# Patient Record
Sex: FEMALE | Race: WHITE | NOT HISPANIC OR LATINO | Employment: UNEMPLOYED | URBAN - METROPOLITAN AREA
[De-identification: names, ages, dates, MRNs, and addresses within clinical notes are randomized per-mention and may not be internally consistent; named-entity substitution may affect disease eponyms.]

---
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
## 2018-01-05 ENCOUNTER — OFFICE VISIT (OUTPATIENT)
Dept: FAMILY MEDICINE CLINIC | Facility: CLINIC | Age: 18
End: 2018-01-05

## 2018-01-05 VITALS
WEIGHT: 111 LBS | RESPIRATION RATE: 16 BRPM | HEIGHT: 62 IN | BODY MASS INDEX: 20.43 KG/M2 | HEART RATE: 102 BPM | OXYGEN SATURATION: 99 % | SYSTOLIC BLOOD PRESSURE: 112 MMHG | DIASTOLIC BLOOD PRESSURE: 68 MMHG | TEMPERATURE: 98.4 F

## 2018-01-05 DIAGNOSIS — F30.9 BIPOLAR I DISORDER, SINGLE MANIC EPISODE (HCC): Primary | ICD-10-CM

## 2018-01-05 DIAGNOSIS — Z79.899 HIGH RISK MEDICATION USE: ICD-10-CM

## 2018-01-05 DIAGNOSIS — F41.9 ANXIETY: ICD-10-CM

## 2018-01-05 LAB
BASOPHILS # BLD AUTO: 0.04 10*3/MM3 (ref 0–0.2)
BASOPHILS NFR BLD AUTO: 0.9 % (ref 0–2)
BUN SERPL-MCNC: 10 MG/DL (ref 6–20)
BUN/CREAT SERPL: 15.2 (ref 7–25)
CALCIUM SERPL-MCNC: 9.4 MG/DL (ref 8.6–10.5)
CHLORIDE SERPL-SCNC: 105 MMOL/L (ref 98–107)
CO2 SERPL-SCNC: 28.8 MMOL/L (ref 22–29)
CREAT SERPL-MCNC: 0.66 MG/DL (ref 0.57–1)
EOSINOPHIL # BLD AUTO: 0.06 10*3/MM3 (ref 0.1–0.3)
EOSINOPHIL NFR BLD AUTO: 1.4 % (ref 0–4)
ERYTHROCYTE [DISTWIDTH] IN BLOOD BY AUTOMATED COUNT: 12.7 % (ref 11.5–14.5)
GLUCOSE SERPL-MCNC: 82 MG/DL (ref 65–99)
HCT VFR BLD AUTO: 44 % (ref 37–47)
HGB BLD-MCNC: 14.6 G/DL (ref 12–16)
IMM GRANULOCYTES # BLD: 0.01 10*3/MM3 (ref 0–0.03)
IMM GRANULOCYTES NFR BLD: 0.2 % (ref 0–0.5)
LYMPHOCYTES # BLD AUTO: 1.84 10*3/MM3 (ref 0.6–4.8)
LYMPHOCYTES NFR BLD AUTO: 43 % (ref 20–45)
MCH RBC QN AUTO: 30 PG (ref 27–31)
MCHC RBC AUTO-ENTMCNC: 33.2 G/DL (ref 31–37)
MCV RBC AUTO: 90.3 FL (ref 81–99)
MONOCYTES # BLD AUTO: 0.43 10*3/MM3 (ref 0–1)
MONOCYTES NFR BLD AUTO: 10 % (ref 3–8)
NEUTROPHILS # BLD AUTO: 1.9 10*3/MM3 (ref 1.5–8.3)
NEUTROPHILS NFR BLD AUTO: 44.5 % (ref 45–70)
NRBC BLD AUTO-RTO: 0 /100 WBC (ref 0–0)
PLATELET # BLD AUTO: 216 10*3/MM3 (ref 140–500)
POTASSIUM SERPL-SCNC: 4.7 MMOL/L (ref 3.5–5.2)
RBC # BLD AUTO: 4.87 10*6/MM3 (ref 4.2–5.4)
SODIUM SERPL-SCNC: 143 MMOL/L (ref 136–145)
TSH SERPL DL<=0.005 MIU/L-ACNC: 1.11 MIU/ML (ref 0.27–4.2)
WBC # BLD AUTO: 4.28 10*3/MM3 (ref 4.8–10.8)

## 2018-01-05 PROCEDURE — 99213 OFFICE O/P EST LOW 20 MIN: CPT | Performed by: PHYSICIAN ASSISTANT

## 2018-01-05 RX ORDER — LAMOTRIGINE 100 MG/1
100 TABLET ORAL DAILY
Qty: 30 TABLET | Refills: 2 | Status: SHIPPED | OUTPATIENT
Start: 2018-01-05 | End: 2018-03-01 | Stop reason: SDUPTHER

## 2018-01-05 NOTE — PROGRESS NOTES
"Subjective   Ana Enriquez is a 17 y.o. female.   Chief Complaint   Patient presents with   • Anxiety     management       History of Present Illness     Ana is a 17 year old female Is for anxiety management.  She has gained 14 pounds since July 13, 2017. Ana has had some increased mood swings and short temper.  She has had some depressed moods. Denied any suicidal or homicidal thoughts. Appetite and sleep has been normal.  Last LMP January 4,2018.  She is on the depo shot for birth control.  She has not been seeing her therapist.    The following portions of the patient's history were reviewed and updated as appropriate: allergies, current medications, past family history, past medical history, past social history and past surgical history.    Review of Systems   Constitutional: Negative.    HENT: Negative.    Eyes: Negative.    Respiratory: Negative.    Cardiovascular: Negative.    Gastrointestinal: Negative.    Endocrine: Negative.    Genitourinary: Negative.    Musculoskeletal: Negative.    Skin: Negative.    Allergic/Immunologic: Negative.    Neurological: Negative.    Hematological: Negative.    Psychiatric/Behavioral: Positive for agitation. Negative for self-injury, sleep disturbance and suicidal ideas. The patient is nervous/anxious.    All other systems reviewed and are negative.    Vitals:    01/05/18 0750   BP: 112/68   BP Location: Left arm   Patient Position: Sitting   Cuff Size: Adult   Pulse: 102   Resp: 16   Temp: 98.4 °F (36.9 °C)   TempSrc: Oral   SpO2: 99%   Weight: 50.3 kg (111 lb)   Height: 158.1 cm (62.25\")   Body mass index is 20.14 kg/(m^2).  No Known Allergies      Wt Readings from Last 3 Encounters:   01/05/18 50.3 kg (111 lb) (19 %, Z= -0.89)*   07/13/17 44 kg (97 lb) (2 %, Z= -2.00)*   04/28/17 46 kg (101 lb 8 oz) (6 %, Z= -1.55)*     * Growth percentiles are based on CDC 2-20 Years data.       BP Readings from Last 3 Encounters:   01/05/18 112/68   07/13/17 98/64   05/19/17 " 111/68     Objective   Physical Exam   Constitutional: She is oriented to person, place, and time. Vital signs are normal. She appears well-developed and well-nourished.   Neck: Trachea normal and phonation normal. Neck supple. No edema present. No thyroid mass and no thyromegaly present.   Cardiovascular: Normal rate, regular rhythm, S1 normal, S2 normal, normal heart sounds and normal pulses.    Pulmonary/Chest: Effort normal and breath sounds normal.   Abdominal: Soft. Normal appearance and bowel sounds are normal. There is no hepatomegaly. There is no tenderness.   Neurological: She is alert and oriented to person, place, and time.   Skin: Skin is warm, dry and intact.   Psychiatric: She has a normal mood and affect. Her speech is normal and behavior is normal. Judgment and thought content normal. Cognition and memory are normal.       Assessment/Plan   Ana was seen today for anxiety.    Diagnoses and all orders for this visit:    Bipolar I disorder, single manic episode  -     lamoTRIgine (LAMICTAL) 100 MG tablet; Take 1 tablet by mouth Daily.  -     Basic Metabolic Panel  -     TSH  -     CBC & Differential    Anxiety  -     lamoTRIgine (LAMICTAL) 100 MG tablet; Take 1 tablet by mouth Daily.  -     Basic Metabolic Panel  -     TSH  -     CBC & Differential    High risk medication use  -     lamoTRIgine (LAMICTAL) 100 MG tablet; Take 1 tablet by mouth Daily.  -     Basic Metabolic Panel  -     TSH  -     CBC & Differential      Ms. Ana Enriquez was seen in office today for follow-up on bipolar and anxiety disorder.  She is having increased mood swings.  I will increase her Lamictal to 100 mg daily.  She is on a high risk medication and will a CBC, TSH and a BMP collected at today's office visit.  She will be notified of test results when completed.  Ana will continue her Paxil 30 mg prescription at home.  I've asked her to return to office in one month for reevaluation.  She voiced understanding.         Dragon transcription disclaimer    Much of this encounter note is an electronic transcription/translation of spoken language to printed text.  The electronic translation of spoken language may permit erroneous, or at times, nonsensical words or phrases to be inadvertently transcribed.  Although I have reviewed the note for such errors, some may still exist.    Nimco Tate PA-C  Family Practice

## 2018-01-08 ENCOUNTER — TELEPHONE (OUTPATIENT)
Dept: FAMILY MEDICINE CLINIC | Facility: CLINIC | Age: 18
End: 2018-01-08

## 2018-01-08 NOTE — TELEPHONE ENCOUNTER
----- Message from Nimco Tate PA-C sent at 1/8/2018  6:44 AM EST -----  1.  Notify patient that her BMP was normal.  2.  TSH was normal at 1.1.  3.  White count was slightly decreased at 4.28.  This is typical for her.  Plan to repeat in 3 months.

## 2018-02-13 ENCOUNTER — OFFICE VISIT (OUTPATIENT)
Dept: FAMILY MEDICINE CLINIC | Facility: CLINIC | Age: 18
End: 2018-02-13

## 2018-02-13 VITALS
HEART RATE: 100 BPM | TEMPERATURE: 98.9 F | HEIGHT: 62 IN | DIASTOLIC BLOOD PRESSURE: 68 MMHG | WEIGHT: 108 LBS | SYSTOLIC BLOOD PRESSURE: 98 MMHG | OXYGEN SATURATION: 99 % | BODY MASS INDEX: 19.88 KG/M2

## 2018-02-13 DIAGNOSIS — A08.4 VIRAL GASTROENTERITIS: ICD-10-CM

## 2018-02-13 DIAGNOSIS — R05.9 COUGH: Primary | ICD-10-CM

## 2018-02-13 LAB
EXPIRATION DATE: NORMAL
FLUAV AG NPH QL: NEGATIVE
FLUBV AG NPH QL: NEGATIVE
INTERNAL CONTROL: NORMAL
Lab: NORMAL

## 2018-02-13 PROCEDURE — 99213 OFFICE O/P EST LOW 20 MIN: CPT | Performed by: NURSE PRACTITIONER

## 2018-02-13 PROCEDURE — 87804 INFLUENZA ASSAY W/OPTIC: CPT | Performed by: NURSE PRACTITIONER

## 2018-02-13 RX ORDER — ONDANSETRON 4 MG/1
4 TABLET, ORALLY DISINTEGRATING ORAL EVERY 8 HOURS PRN
COMMUNITY
End: 2018-04-23

## 2018-02-13 NOTE — PROGRESS NOTES
Subjective   Ana Enriquez is a 17 y.o. female.     Chief Complaint   Patient presents with   • Vomiting      went to urgent care sunday night    • Diarrhea   • Nasal Congestion     Social History   Substance Use Topics   • Smoking status: Current Every Day Smoker     Years: 5.00     Types: Electronic Cigarette   • Smokeless tobacco: Never Used      Comment: DENIED   • Alcohol use No      Comment: DENIED       Vomiting    This is a new problem. The current episode started in the past 7 days (3 days). The problem has been gradually improving. The emesis has an appearance of stomach contents and bile. There has been no fever. Associated symptoms include abdominal pain (lower abdomen), chills, diarrhea, dizziness, myalgias (soreness) and sweats. Pertinent negatives include no coughing or fever. Associated symptoms comments: Nauseated  Chest congestion  Body aches      . Risk factors include ill contacts. Treatments tried: zofran. The treatment provided mild relief.     Review of Systems   Constitutional: Positive for chills and fatigue. Negative for fever.   HENT: Negative.    Respiratory: Positive for chest tightness (congestion at night). Negative for cough.    Cardiovascular: Negative.    Gastrointestinal: Positive for abdominal pain (lower abdomen), diarrhea and vomiting.   Musculoskeletal: Positive for myalgias (soreness).   Neurological: Positive for dizziness.   Psychiatric/Behavioral: Negative.    All other systems reviewed and are negative.    Physical Exam   Gen: mildly ill appearing, alert  Ears: Tm's bulging without redness  Nose:  Congestion  Throat:  Red without exudate, some drainage, tonsils okay  Neck: no LAD  Lung: good air movement, regular RR  Abd: increased bowel sounds throughout, tenderness in LUQ and LLQ.   Heart: RR without murmur  Skin: no rash.    The following portions of the patient's history were reviewed and updated as appropriate: allergies, current medications,past medical hx, family  "hx, past social history an...    Chief Complaint   Patient presents with   • Vomiting      went to urgent care sunday night    • Diarrhea   • Nasal Congestion       Vitals:    02/13/18 1010   BP: 98/68   Pulse: 100   Temp: 98.9 °F (37.2 °C)   TempSrc: Oral   SpO2: 99%   Weight: 49 kg (108 lb)   Height: 158.1 cm (62.25\")       Assessment/Plan   Problems Addressed this Visit     None      Visit Diagnoses     Cough    -  Primary    Relevant Orders    POCT Influenza A/B (Completed)    Viral gastroenteritis            Results for orders placed or performed in visit on 02/13/18   POCT Influenza A/B   Result Value Ref Range    Rapid Influenza A Ag negative     Rapid Influenza B Ag negative     Internal Control Passed Passed    Lot Number [id number]     Expiration Date 12/1/2019      Continue Zofran as needed. May also want to  probiotic and/or imodium. Encouraged fluid intake and to reintroduce foods via BRAT diet.   Work note given to return when able to eat 2 meals and improved or resolved diarrhea.        Tylenol or Advil as needed for pain, fever, muscle aches  Plenty of fluids  Hand washing discussed  Off work or school note given if needed.  Warm tea for throat.      Jo Ann PEREZ  Family Practice  [place], [place]  [facility]  "

## 2018-03-01 DIAGNOSIS — F41.9 ANXIETY: ICD-10-CM

## 2018-03-01 DIAGNOSIS — F30.9 BIPOLAR I DISORDER, SINGLE MANIC EPISODE (HCC): ICD-10-CM

## 2018-03-01 DIAGNOSIS — Z79.899 HIGH RISK MEDICATION USE: ICD-10-CM

## 2018-03-01 RX ORDER — PAROXETINE 30 MG/1
30 TABLET, FILM COATED ORAL EVERY MORNING
Qty: 30 TABLET | Refills: 2 | Status: SHIPPED | OUTPATIENT
Start: 2018-03-01 | End: 2018-07-18 | Stop reason: SDUPTHER

## 2018-03-01 RX ORDER — LAMOTRIGINE 100 MG/1
100 TABLET ORAL DAILY
Qty: 30 TABLET | Refills: 2 | Status: SHIPPED | OUTPATIENT
Start: 2018-03-01 | End: 2018-07-13 | Stop reason: SDUPTHER

## 2018-04-18 ENCOUNTER — OFFICE VISIT (OUTPATIENT)
Dept: FAMILY MEDICINE CLINIC | Facility: CLINIC | Age: 18
End: 2018-04-18

## 2018-04-18 VITALS
HEIGHT: 62 IN | OXYGEN SATURATION: 98 % | DIASTOLIC BLOOD PRESSURE: 58 MMHG | HEART RATE: 88 BPM | WEIGHT: 115 LBS | RESPIRATION RATE: 16 BRPM | SYSTOLIC BLOOD PRESSURE: 98 MMHG | TEMPERATURE: 98.7 F | BODY MASS INDEX: 21.16 KG/M2

## 2018-04-18 DIAGNOSIS — R63.2 POLYPHAGIA: ICD-10-CM

## 2018-04-18 DIAGNOSIS — R07.89 CHEST PAIN, MID STERNAL: Primary | ICD-10-CM

## 2018-04-18 DIAGNOSIS — M54.50 ACUTE MIDLINE LOW BACK PAIN WITHOUT SCIATICA: ICD-10-CM

## 2018-04-18 DIAGNOSIS — N39.0 URINARY TRACT INFECTION WITHOUT HEMATURIA, SITE UNSPECIFIED: ICD-10-CM

## 2018-04-18 DIAGNOSIS — R35.0 URINE FREQUENCY: ICD-10-CM

## 2018-04-18 LAB
B-HCG UR QL: NEGATIVE
BILIRUB BLD-MCNC: NEGATIVE MG/DL
CLARITY, POC: CLEAR
COLOR UR: YELLOW
GLUCOSE UR STRIP-MCNC: NEGATIVE MG/DL
INTERNAL NEGATIVE CONTROL: NEGATIVE
INTERNAL POSITIVE CONTROL: POSITIVE
KETONES UR QL: NEGATIVE
LEUKOCYTE EST, POC: ABNORMAL
Lab: NORMAL
NITRITE UR-MCNC: NEGATIVE MG/ML
PH UR: 8.5 [PH] (ref 5–8)
PROT UR STRIP-MCNC: ABNORMAL MG/DL
RBC # UR STRIP: NEGATIVE /UL
SP GR UR: 1.01 (ref 1–1.03)
UROBILINOGEN UR QL: NORMAL

## 2018-04-18 PROCEDURE — 81025 URINE PREGNANCY TEST: CPT | Performed by: PHYSICIAN ASSISTANT

## 2018-04-18 PROCEDURE — 99214 OFFICE O/P EST MOD 30 MIN: CPT | Performed by: PHYSICIAN ASSISTANT

## 2018-04-18 PROCEDURE — 93000 ELECTROCARDIOGRAM COMPLETE: CPT | Performed by: PHYSICIAN ASSISTANT

## 2018-04-18 PROCEDURE — 81002 URINALYSIS NONAUTO W/O SCOPE: CPT | Performed by: PHYSICIAN ASSISTANT

## 2018-04-18 RX ORDER — NITROFURANTOIN 25; 75 MG/1; MG/1
100 CAPSULE ORAL 2 TIMES DAILY
Qty: 14 CAPSULE | Refills: 0 | Status: SHIPPED | OUTPATIENT
Start: 2018-04-18 | End: 2018-04-26

## 2018-04-18 NOTE — PROGRESS NOTES
Subjective   Ana Enriquez is a 17 y.o. female.   Chief Complaint   Patient presents with   • Excessive Sweating   • Back Pain   • Headache       History of Present Illness     Miss Ana Enriquez is a 17-year-old female who presents with headaches,excessive sweating,urine frequency,sternum chest pain,shortness of air with exertion and sitting,nausea,diarrhea,fevers,chills,blurred vision,polydipsia,polyuria,polyphagia,urine hesitancy,sight cough,wheezing, and low back pain for the past  3 weeks or so.  Diet has been normal but not healthy.  Sleep has normal.  She takes depo shot for birth control.  Last depo shot was February 2018.  Denied ear pressure ,sinus pressure or sore throat.  Last eye exam was February 2018.   Her paternal grandparent and maternal grandmother has diabetes. Ana has gained 7 pounds since her last office visit in February 2018.    The following portions of the patient's history were reviewed and updated as appropriate: allergies, current medications, past family history, past medical history, past social history and past surgical history.    Review of Systems   Constitutional: Negative.    HENT: Negative.    Eyes: Negative.    Respiratory: Positive for cough and wheezing. Negative for choking and shortness of breath.    Cardiovascular: Negative.    Gastrointestinal: Negative.    Endocrine: Positive for heat intolerance, polydipsia, polyphagia and polyuria.   Genitourinary: Negative.    Musculoskeletal: Positive for back pain.   Skin: Negative.    Allergic/Immunologic: Negative.    Neurological: Negative.    Hematological: Negative.    Psychiatric/Behavioral: Negative.  Negative for sleep disturbance and suicidal ideas.   All other systems reviewed and are negative.      Social History     Social History   • Marital status: Single     Spouse name: N/A   • Number of children: N/A   • Years of education: N/A     Occupational History   • Not on file.     Social History Main Topics   • Smoking  "status: Current Every Day Smoker     Years: 5.00     Types: Electronic Cigarette   • Smokeless tobacco: Never Used      Comment: DENIED   • Alcohol use No      Comment: DENIED   • Drug use: Unknown   • Sexual activity: Defer     Other Topics Concern   • Not on file     Social History Narrative   • No narrative on file     Vitals:    04/18/18 0733   BP: 98/58   BP Location: Right arm   Patient Position: Sitting   Cuff Size: Adult   Pulse: 88   Resp: 16   Temp: 98.7 °F (37.1 °C)   TempSrc: Oral   SpO2: 98%   Weight: 52.2 kg (115 lb)   Height: 158.1 cm (62.25\")       Wt Readings from Last 3 Encounters:   04/18/18 52.2 kg (115 lb) (25 %, Z= -0.66)*   02/13/18 49 kg (108 lb) (13 %, Z= -1.12)*   01/05/18 50.3 kg (111 lb) (19 %, Z= -0.89)*     * Growth percentiles are based on CDC 2-20 Years data.       BP Readings from Last 3 Encounters:   04/18/18 98/58   02/13/18 98/68   01/05/18 112/68     Body mass index is 20.87 kg/m².  No Known Allergies    Objective   Physical Exam   Constitutional: Vital signs are normal. She appears well-developed and well-nourished.   HENT:   Head: Normocephalic and atraumatic.   Right Ear: Hearing, tympanic membrane, external ear and ear canal normal.   Left Ear: Hearing, tympanic membrane, external ear and ear canal normal.   Nose: Nose normal. Right sinus exhibits no maxillary sinus tenderness and no frontal sinus tenderness. Left sinus exhibits no maxillary sinus tenderness and no frontal sinus tenderness.   Mouth/Throat: Uvula is midline, oropharynx is clear and moist and mucous membranes are normal.   Eyes: Conjunctivae, EOM and lids are normal.   Neck: Trachea normal and phonation normal. Neck supple. Carotid bruit is not present. No edema present. No thyromegaly present.   Cardiovascular: Normal rate, regular rhythm, S1 normal, S2 normal, normal heart sounds and normal pulses.        Pulmonary/Chest: Effort normal and breath sounds normal.   Abdominal: Soft. Normal appearance, normal " aorta and bowel sounds are normal. There is no hepatomegaly. There is no tenderness. There is no CVA tenderness.   Musculoskeletal:        Lumbar back: Normal. She exhibits normal range of motion, no tenderness, no bony tenderness, no pain, no spasm and normal pulse.   Low back:  Non tender to palpation, FROM,5/5 B/L muscle strength,negative SLR,2+ DTR's,2+ distal pulses,and good heel-toe walk.   Lymphadenopathy:     She has no cervical adenopathy.   Neurological: She is alert. She has normal strength. No sensory deficit.   Reflex Scores:       Patellar reflexes are 2+ on the right side and 2+ on the left side.  Skin: Skin is warm, dry and intact. Capillary refill takes less than 2 seconds.   Psychiatric: She has a normal mood and affect. Her speech is normal and behavior is normal. Judgment and thought content normal. Cognition and memory are normal.           ECG 12 Lead  Date/Time: 4/18/2018 7:51 AM  Performed by: SARAH BETH SINGLETARY  Authorized by: SARAH BETH SINGLETARY   Comparison: not compared with previous ECG   Rhythm: sinus rhythm  Rate: normal  BPM: 72  Conduction: conduction normal  ST Segments: ST segments normal  T Waves: T waves normal  QRS axis: normal  Clinical impression: normal ECG  Comments: Indication: Sternal chest pain  P/IL:  90/140 ms  QRS:  84 ms  QT/QTc:  384/420 ms                Results for orders placed or performed in visit on 04/18/18   POC Pregnancy, Urine   Result Value Ref Range    HCG, Urine, QL Negative Negative    Lot Number [id number]     Internal Positive Control Positive     Internal Negative Control Negative    POC Urinalysis Dipstick   Result Value Ref Range    Color Yellow Yellow, Straw, Dark Yellow, Neha    Clarity, UA Clear Clear    Glucose, UA Negative Negative, 1000 mg/dL (3+) mg/dL    Bilirubin Negative Negative    Ketones, UA Negative Negative    Specific Gravity  1.010 1.005 - 1.030    Blood, UA Negative Negative    pH, Urine 8.5 (A) 5.0 - 8.0    Protein, POC Trace (A)  Negative mg/dL    Urobilinogen, UA Normal Normal    Leukocytes Trace (A) Negative    Nitrite, UA Negative Negative     Assessment/Plan   Ana was seen today for excessive sweating, back pain and headache.    Diagnoses and all orders for this visit:    Chest pain, mid sternal  -     CBC & Differential  -     Comprehensive Metabolic Panel  -     Thyroid Panel With TSH    Urine frequency  -     POC Pregnancy, Urine  -     POC Urinalysis Dipstick  -     Urine Culture - Urine, Urine, Clean Catch  -     nitrofurantoin, macrocrystal-monohydrate, (MACROBID) 100 MG capsule; Take 1 capsule by mouth 2 (Two) Times a Day.    Polyphagia  -     CBC & Differential  -     Comprehensive Metabolic Panel  -     Thyroid Panel With TSH    Acute midline low back pain without sciatica    Urinary tract infection without hematuria, site unspecified  -     Urine Culture - Urine, Urine, Clean Catch  -     nitrofurantoin, macrocrystal-monohydrate, (MACROBID) 100 MG capsule; Take 1 capsule by mouth 2 (Two) Times a Day.    Other orders  -     ECG 12 Lead    1.  New Mid-Sternal chest pain: In office EKG was normal.  She will have a CBC, CMP and a thyroid profile with TSH collected at today's office visit.  I'll consider imaging study in future if warranted.  Ana will be notified of test results when completed.  2.  New low back pain with urine frequency: In office urinalysis showed trace protein and leukocytes.  In office urine pregnancy test was negative.  A culture and sensitivity will be seen today laboratory for further evaluation.  I have prescribed Macrobid antibiotic to pharmacy.  Eda he will be notified of test results when completed.  3. New polyphagia:  Ana will have a CBC, CMP and a thyroid profile with TSH collected at today's office visit for further evaluation of her symptoms.  She'll be notified of test results when completed.

## 2018-04-20 LAB
ALBUMIN SERPL-MCNC: 4.7 G/DL (ref 3.5–5.2)
ALBUMIN/GLOB SERPL: 2.1 G/DL
ALP SERPL-CCNC: 69 U/L (ref 40–129)
ALT SERPL-CCNC: 8 U/L (ref 5–33)
AST SERPL-CCNC: 22 U/L (ref 5–32)
BACTERIA UR CULT: NORMAL
BACTERIA UR CULT: NORMAL
BASOPHILS # BLD AUTO: 0.04 10*3/MM3 (ref 0–0.2)
BASOPHILS NFR BLD AUTO: 0.8 % (ref 0–2)
BILIRUB SERPL-MCNC: 0.5 MG/DL (ref 0.2–1.2)
BUN SERPL-MCNC: 12 MG/DL (ref 6–20)
BUN/CREAT SERPL: 16.9 (ref 7–25)
CALCIUM SERPL-MCNC: 9.6 MG/DL (ref 8.6–10.5)
CHLORIDE SERPL-SCNC: 104 MMOL/L (ref 98–107)
CO2 SERPL-SCNC: 25.7 MMOL/L (ref 22–29)
CREAT SERPL-MCNC: 0.71 MG/DL (ref 0.57–1)
EOSINOPHIL # BLD AUTO: 0.1 10*3/MM3 (ref 0.1–0.3)
EOSINOPHIL NFR BLD AUTO: 2 % (ref 0–4)
ERYTHROCYTE [DISTWIDTH] IN BLOOD BY AUTOMATED COUNT: 12 % (ref 11.5–14.5)
FT4I SERPL CALC-MCNC: 1.6 (ref 1.2–4.9)
GFR SERPLBLD CREATININE-BSD FMLA CKD-EPI: 106 ML/MIN/1.73
GFR SERPLBLD CREATININE-BSD FMLA CKD-EPI: 129 ML/MIN/1.73
GLOBULIN SER CALC-MCNC: 2.2 GM/DL
GLUCOSE SERPL-MCNC: 91 MG/DL (ref 65–99)
HCT VFR BLD AUTO: 44.8 % (ref 37–47)
HGB BLD-MCNC: 15.1 G/DL (ref 12–16)
IMM GRANULOCYTES # BLD: 0.01 10*3/MM3 (ref 0–0.03)
IMM GRANULOCYTES NFR BLD: 0.2 % (ref 0–0.5)
LYMPHOCYTES # BLD AUTO: 1.86 10*3/MM3 (ref 0.6–4.8)
LYMPHOCYTES NFR BLD AUTO: 36.8 % (ref 20–45)
MCH RBC QN AUTO: 31.5 PG (ref 27–31)
MCHC RBC AUTO-ENTMCNC: 33.7 G/DL (ref 31–37)
MCV RBC AUTO: 93.3 FL (ref 81–99)
MONOCYTES # BLD AUTO: 0.38 10*3/MM3 (ref 0–1)
MONOCYTES NFR BLD AUTO: 7.5 % (ref 3–8)
NEUTROPHILS # BLD AUTO: 2.66 10*3/MM3 (ref 1.5–8.3)
NEUTROPHILS NFR BLD AUTO: 52.7 % (ref 45–70)
NRBC BLD AUTO-RTO: 0 /100 WBC (ref 0–0)
PLATELET # BLD AUTO: 201 10*3/MM3 (ref 140–500)
POTASSIUM SERPL-SCNC: 4.3 MMOL/L (ref 3.5–5.2)
PROT SERPL-MCNC: 6.9 G/DL (ref 6–8.5)
RBC # BLD AUTO: 4.8 10*6/MM3 (ref 4.2–5.4)
SODIUM SERPL-SCNC: 143 MMOL/L (ref 136–145)
T3RU NFR SERPL: 26 % (ref 24–39)
T4 SERPL-MCNC: 6 UG/DL (ref 4.5–12)
TSH SERPL DL<=0.005 MIU/L-ACNC: 1.06 UIU/ML (ref 0.45–4.5)
WBC # BLD AUTO: 5.05 10*3/MM3 (ref 4.8–10.8)

## 2018-04-23 ENCOUNTER — OFFICE VISIT (OUTPATIENT)
Dept: RETAIL CLINIC | Facility: CLINIC | Age: 18
End: 2018-04-23

## 2018-04-23 VITALS
TEMPERATURE: 98.5 F | HEART RATE: 98 BPM | DIASTOLIC BLOOD PRESSURE: 68 MMHG | OXYGEN SATURATION: 96 % | SYSTOLIC BLOOD PRESSURE: 110 MMHG | RESPIRATION RATE: 15 BRPM

## 2018-04-23 DIAGNOSIS — J06.9 VIRAL UPPER RESPIRATORY TRACT INFECTION: ICD-10-CM

## 2018-04-23 DIAGNOSIS — J40 BRONCHITIS: Primary | ICD-10-CM

## 2018-04-23 PROCEDURE — 99213 OFFICE O/P EST LOW 20 MIN: CPT | Performed by: NURSE PRACTITIONER

## 2018-04-23 RX ORDER — PREDNISONE 1 MG/1
TABLET ORAL
Qty: 21 TABLET | Refills: 0 | Status: SHIPPED | OUTPATIENT
Start: 2018-04-23 | End: 2018-04-26

## 2018-04-23 RX ORDER — ALBUTEROL SULFATE 90 UG/1
2 AEROSOL, METERED RESPIRATORY (INHALATION) EVERY 4 HOURS PRN
Qty: 1 INHALER | Refills: 0 | Status: SHIPPED | OUTPATIENT
Start: 2018-04-23 | End: 2019-07-22

## 2018-04-23 RX ORDER — CETIRIZINE HYDROCHLORIDE 10 MG/1
10 TABLET ORAL DAILY
Qty: 10 TABLET | Refills: 0 | Status: SHIPPED | OUTPATIENT
Start: 2018-04-23 | End: 2018-04-26

## 2018-04-23 RX ORDER — BENZONATATE 100 MG/1
100 CAPSULE ORAL 2 TIMES DAILY PRN
Qty: 14 CAPSULE | Refills: 0 | Status: SHIPPED | OUTPATIENT
Start: 2018-04-23 | End: 2018-07-18

## 2018-04-23 NOTE — PATIENT INSTRUCTIONS
"Upper Respiratory Infection, Adult  Most upper respiratory infections (URIs) are caused by a virus. A URI affects the nose, throat, and upper air passages. The most common type of URI is often called \"the common cold.\"  Follow these instructions at home:  · Take medicines only as told by your doctor.  · Gargle warm saltwater or take cough drops to comfort your throat as told by your doctor.  · Use a warm mist humidifier or inhale steam from a shower to increase air moisture. This may make it easier to breathe.  · Drink enough fluid to keep your pee (urine) clear or pale yellow.  · Eat soups and other clear broths.  · Have a healthy diet.  · Rest as needed.  · Go back to work when your fever is gone or your doctor says it is okay.  ¨ You may need to stay home longer to avoid giving your URI to others.  ¨ You can also wear a face mask and wash your hands often to prevent spread of the virus.  · Use your inhaler more if you have asthma.  · Do not use any tobacco products, including cigarettes, chewing tobacco, or electronic cigarettes. If you need help quitting, ask your doctor.  Contact a doctor if:  · You are getting worse, not better.  · Your symptoms are not helped by medicine.  · You have chills.  · You are getting more short of breath.  · You have brown or red mucus.  · You have yellow or brown discharge from your nose.  · You have pain in your face, especially when you bend forward.  · You have a fever.  · You have puffy (swollen) neck glands.  · You have pain while swallowing.  · You have white areas in the back of your throat.  Get help right away if:  · You have very bad or constant:  ¨ Headache.  ¨ Ear pain.  ¨ Pain in your forehead, behind your eyes, and over your cheekbones (sinus pain).  ¨ Chest pain.  · You have long-lasting (chronic) lung disease and any of the following:  ¨ Wheezing.  ¨ Long-lasting cough.  ¨ Coughing up blood.  ¨ A change in your usual mucus.  · You have a stiff neck.  · You have " changes in your:  ¨ Vision.  ¨ Hearing.  ¨ Thinking.  ¨ Mood.  This information is not intended to replace advice given to you by your health care provider. Make sure you discuss any questions you have with your health care provider.  Document Released: 06/05/2009 Document Revised: 08/20/2017 Document Reviewed: 03/25/2015  "[employer] Interactive Patient Education © 2017 "[employer] Inc.    Acute Bronchitis, Adult  Acute bronchitis is when air tubes (bronchi) in the lungs suddenly get swollen. The condition can make it hard to breathe. It can also cause these symptoms:  · A cough.  · Coughing up clear, yellow, or green mucus.  · Wheezing.  · Chest congestion.  · Shortness of breath.  · A fever.  · Body aches.  · Chills.  · A sore throat.  Follow these instructions at home:  Medicines   · Take over-the-counter and prescription medicines only as told by your doctor.  · If you were prescribed an antibiotic medicine, take it as told by your doctor. Do not stop taking the antibiotic even if you start to feel better.  General instructions   · Rest.  · Drink enough fluids to keep your pee (urine) clear or pale yellow.  · Avoid smoking and secondhand smoke. If you smoke and you need help quitting, ask your doctor. Quitting will help your lungs heal faster.  · Use an inhaler, cool mist vaporizer, or humidifier as told by your doctor.  · Keep all follow-up visits as told by your doctor. This is important.  How is this prevented?  To lower your risk of getting this condition again:  · Wash your hands often with soap and water. If you cannot use soap and water, use hand .  · Avoid contact with people who have cold symptoms.  · Try not to touch your hands to your mouth, nose, or eyes.  · Make sure to get the flu shot every year.  Contact a doctor if:  · Your symptoms do not get better in 2 weeks.  Get help right away if:  · You cough up blood.  · You have chest pain.  · You have very bad shortness of breath.  · You become  dehydrated.  · You faint (pass out) or keep feeling like you are going to pass out.  · You keep throwing up (vomiting).  · You have a very bad headache.  · Your fever or chills gets worse.  This information is not intended to replace advice given to you by your health care provider. Make sure you discuss any questions you have with your health care provider.  Document Released: 06/05/2009 Document Revised: 07/26/2017 Document Reviewed: 06/07/2017  [employer] Interactive Patient Education © 2017 [employer]  Talked to the patient about the diagnosis and educate the patient and advise to visit to PCP if the symptoms worsens

## 2018-04-23 NOTE — PROGRESS NOTES
Subjective   Ana Enriquez is a 17 y.o. female.     Cough   This is a new problem. The current episode started yesterday. The problem has been gradually worsening. The cough is non-productive. Associated symptoms include nasal congestion, postnasal drip and wheezing. Risk factors for lung disease include smoking/tobacco exposure. She has tried OTC cough suppressant for the symptoms. The treatment provided mild relief. Her past medical history is significant for bronchitis.   URI    This is a recurrent problem. The current episode started in the past 7 days. The problem has been gradually worsening. The maximum temperature recorded prior to her arrival was 100.4 - 100.9 F. The fever has been present for 3 to 4 days. Associated symptoms include congestion, coughing and wheezing. She has tried acetaminophen for the symptoms. The treatment provided mild relief.        The following portions of the patient's history were reviewed and updated as appropriate: allergies, current medications, past family history, past medical history, past social history, past surgical history and problem list.    Review of Systems   Constitutional: Negative.    HENT: Positive for congestion and postnasal drip.    Eyes: Negative.    Respiratory: Positive for cough, chest tightness and wheezing.    Cardiovascular: Negative.    Gastrointestinal: Negative.        Objective   Physical Exam   Constitutional: She appears well-developed.   HENT:   Head: Normocephalic and atraumatic.   Right Ear: External ear normal.   Left Ear: External ear normal.   Nose: Mucosal edema present.   Mouth/Throat: No oropharyngeal exudate, posterior oropharyngeal edema or posterior oropharyngeal erythema.   Eyes: Pupils are equal, round, and reactive to light.   Neck: Normal range of motion.   Cardiovascular: Normal rate, regular rhythm and normal heart sounds.    Pulmonary/Chest: No respiratory distress. She has wheezes in the right upper field and the left upper  field. She has rhonchi in the right middle field and the left middle field. She exhibits tenderness.   Abdominal: Soft. Bowel sounds are normal.   Nursing note and vitals reviewed.      Assessment/Plan   Ana was seen today for cough, nasal congestion and chills.    Diagnoses and all orders for this visit:    Bronchitis  -     predniSONE (DELTASONE) 5 MG tablet; 5 MG PACK WITH PACK GE  -     albuterol (PROVENTIL HFA;VENTOLIN HFA) 108 (90 Base) MCG/ACT inhaler; Inhale 2 puffs Every 4 (Four) Hours As Needed for Wheezing.  -     benzonatate (TESSALON PERLES) 100 MG capsule; Take 1 capsule by mouth 2 (Two) Times a Day As Needed for Cough.    Viral upper respiratory tract infection  -     cetirizine (zyrTEC) 10 MG tablet; Take 1 tablet by mouth Daily.      Talked to the patient about the diagnosis and educate the patient and advise to visit to PCP if the symptoms worsens

## 2018-04-26 ENCOUNTER — APPOINTMENT (OUTPATIENT)
Dept: GENERAL RADIOLOGY | Facility: HOSPITAL | Age: 18
End: 2018-04-26

## 2018-04-26 PROCEDURE — 71046 X-RAY EXAM CHEST 2 VIEWS: CPT | Performed by: FAMILY MEDICINE

## 2018-07-13 DIAGNOSIS — F41.9 ANXIETY: ICD-10-CM

## 2018-07-13 DIAGNOSIS — Z79.899 HIGH RISK MEDICATION USE: ICD-10-CM

## 2018-07-13 DIAGNOSIS — F30.9 BIPOLAR I DISORDER, SINGLE MANIC EPISODE (HCC): ICD-10-CM

## 2018-07-13 RX ORDER — LAMOTRIGINE 100 MG/1
100 TABLET ORAL DAILY
Qty: 15 TABLET | Refills: 0 | Status: SHIPPED | OUTPATIENT
Start: 2018-07-13 | End: 2018-07-18 | Stop reason: DRUGHIGH

## 2018-07-18 ENCOUNTER — OFFICE VISIT (OUTPATIENT)
Dept: FAMILY MEDICINE CLINIC | Facility: CLINIC | Age: 18
End: 2018-07-18

## 2018-07-18 VITALS
WEIGHT: 114 LBS | OXYGEN SATURATION: 98 % | TEMPERATURE: 98.4 F | BODY MASS INDEX: 20.2 KG/M2 | HEIGHT: 63 IN | DIASTOLIC BLOOD PRESSURE: 62 MMHG | SYSTOLIC BLOOD PRESSURE: 102 MMHG | RESPIRATION RATE: 16 BRPM | HEART RATE: 86 BPM

## 2018-07-18 DIAGNOSIS — F30.9 BIPOLAR I DISORDER, SINGLE MANIC EPISODE (HCC): Primary | ICD-10-CM

## 2018-07-18 DIAGNOSIS — F41.9 ANXIETY: ICD-10-CM

## 2018-07-18 PROBLEM — M54.50 ACUTE MIDLINE LOW BACK PAIN WITHOUT SCIATICA: Status: RESOLVED | Noted: 2018-04-18 | Resolved: 2018-07-18

## 2018-07-18 PROBLEM — R07.89 CHEST PAIN, MID STERNAL: Status: RESOLVED | Noted: 2018-04-18 | Resolved: 2018-07-18

## 2018-07-18 PROCEDURE — 99213 OFFICE O/P EST LOW 20 MIN: CPT | Performed by: PHYSICIAN ASSISTANT

## 2018-07-18 RX ORDER — LAMOTRIGINE 200 MG/1
200 TABLET ORAL DAILY
Qty: 30 TABLET | Refills: 2 | Status: SHIPPED | OUTPATIENT
Start: 2018-07-18 | End: 2018-11-15 | Stop reason: DRUGHIGH

## 2018-07-18 RX ORDER — PAROXETINE 30 MG/1
30 TABLET, FILM COATED ORAL EVERY MORNING
Qty: 30 TABLET | Refills: 2 | Status: SHIPPED | OUTPATIENT
Start: 2018-07-18 | End: 2018-11-15 | Stop reason: SDUPTHER

## 2018-07-18 NOTE — PROGRESS NOTES
"Subjective   Ana Enriquez is a 17 y.o. female.   Chief Complaint   Patient presents with   • Anxiety     management       History of Present Illness     Ana is a 17 year old female who presents for anxiety management. She was seeing [facility] but states she doesn't like them.  They increased her Paxil 100 mg daily.  She has been taking Lamictal 100 mg daily.  State she is feeling okay but has had some mood swings.  She recently broke up with her fiancé.  Ana has moved back, living with mother.  Denied any Suicidal or homicidal thoughts.  She has cut herself once a few days.  Appetite has been slightly decreased.  Sleep has been restless.  She has good family support system.     The following portions of the patient's history were reviewed and updated as appropriate: allergies, current medications, past family history, past medical history, past social history and past surgical history.    Review of Systems   Constitutional: Negative.    HENT: Negative.    Eyes: Negative.    Respiratory: Negative.    Cardiovascular: Negative.    Gastrointestinal: Negative.    Endocrine: Negative.    Genitourinary: Negative.    Musculoskeletal: Negative.    Skin: Negative.    Allergic/Immunologic: Negative.    Neurological: Negative.    Hematological: Negative.    Psychiatric/Behavioral: Positive for sleep disturbance. Negative for suicidal ideas. The patient is nervous/anxious.         Depression and mood swings   All other systems reviewed and are negative.      Social History   Substance Use Topics   • Smoking status: Current Every Day Smoker     Years: 5.00     Types: Electronic Cigarette   • Smokeless tobacco: Never Used      Comment: DENIED   • Alcohol use No      Comment: DENIED     Vitals:    07/18/18 0745   BP: 102/62   BP Location: Right arm   Patient Position: Sitting   Cuff Size: Adult   Pulse: 86   Resp: 16   Temp: 98.4 °F (36.9 °C)   TempSrc: Oral   SpO2: 98%   Weight: 51.7 kg (114 lb)   Height: 160 cm (63\") "       Wt Readings from Last 3 Encounters:   07/18/18 51.7 kg (114 lb) (23 %, Z= -0.75)*   04/26/18 52.2 kg (115 lb) (25 %, Z= -0.66)*   04/18/18 52.2 kg (115 lb) (25 %, Z= -0.66)*     * Growth percentiles are based on [facility] 2-20 Years data.       BP Readings from Last 3 Encounters:   07/18/18 102/62   04/26/18 107/73   04/23/18 110/68     Body mass index is 20.19 kg/m².  No Known Allergies    Objective   Physical Exam   Constitutional: She is oriented to person, place, and time. Vital signs are normal. She appears well-developed and well-nourished.   Neck: Trachea normal and phonation normal. Neck supple. No edema present.   Cardiovascular: Normal rate, regular rhythm, S1 normal, S2 normal, normal heart sounds and normal pulses.    Pulmonary/Chest: Effort normal and breath sounds normal.   Abdominal: Soft. Normal appearance, normal aorta and bowel sounds are normal. There is no hepatomegaly. There is no tenderness.   Neurological: She is alert and oriented to person, place, and time.   Skin: Skin is warm, dry and intact. Capillary refill takes less than 2 seconds.   Psychiatric: She has a normal mood and affect. Her speech is normal and behavior is normal. Judgment and thought content normal. Cognition and memory are normal.       Assessment/Plan   Ana was seen today for anxiety.    Diagnoses and all orders for this visit:    Bipolar I disorder, single manic episode (CMS/HCC)  -     lamoTRIgine (LAMICTAL) 200 MG tablet; Take 1 tablet by mouth Daily.    Anxiety  -     lamoTRIgine (LAMICTAL) 200 MG tablet; Take 1 tablet by mouth Daily.      Ms. Ana Enriquez was seen in office today in office today for chronic and uncontrolled bipolar 1 disorder with anxiety.  We have cultured pharmacy Ana has been taking Paxil 30 mg medication.  She has not picked up her prescription in more than 4 months.  We'll restart Paxil 30 mg.  Will increase her Lamictal to 200 mg daily.  I've asked Ana to schedule follow-up appointment  in 3 weeks for reevaluation.

## 2018-08-09 ENCOUNTER — HOSPITAL ENCOUNTER (OUTPATIENT)
Dept: GENERAL RADIOLOGY | Facility: HOSPITAL | Age: 18
Discharge: HOME | End: 2018-08-09
Admitting: PHYSICIAN ASSISTANT

## 2018-08-09 ENCOUNTER — OFFICE VISIT (OUTPATIENT)
Dept: FAMILY MEDICINE CLINIC | Facility: CLINIC | Age: 18
End: 2018-08-09

## 2018-08-09 VITALS
BODY MASS INDEX: 20.02 KG/M2 | HEART RATE: 79 BPM | HEIGHT: 63 IN | SYSTOLIC BLOOD PRESSURE: 112 MMHG | OXYGEN SATURATION: 98 % | DIASTOLIC BLOOD PRESSURE: 62 MMHG | WEIGHT: 113 LBS

## 2018-08-09 DIAGNOSIS — M79.641 RIGHT HAND PAIN: ICD-10-CM

## 2018-08-09 DIAGNOSIS — F30.9 BIPOLAR I DISORDER, SINGLE MANIC EPISODE (HCC): ICD-10-CM

## 2018-08-09 DIAGNOSIS — F41.9 ANXIETY: Primary | ICD-10-CM

## 2018-08-09 PROCEDURE — 99214 OFFICE O/P EST MOD 30 MIN: CPT | Performed by: PHYSICIAN ASSISTANT

## 2018-08-09 PROCEDURE — 73130 X-RAY EXAM OF HAND: CPT

## 2018-08-09 NOTE — PROGRESS NOTES
Subjective   Ana Enriquez is a 17 y.o. female.     Chief Complaint   Patient presents with   • Anxiety     follow up     History of Present Illness     Ana is a 17-year-old female who presents for bipolar anxiety management. She just started the Lamictal medication 2 days ago.  She has been taking the Paxil 30 mg daily.   She has bee having some headaches that stated one month ago.  States the headaches comes and goes.  States the headaches start in the back of head and goes to front of forehead.  Describes the headaches as a dull ache to pressure feeling that last 5 minutes.   She has not taking taken any OTC medications.  Caffeine intake is 12 oz of coke every other day. She has not been eating regularly due to living on own.  States she's been having right hand pain along the knuckles off-and-on for the past several weeks.  She has had fractures in her hand in past.  She does not recall hitting any objects.  She has not taken any over-the-counter medication for pain or discomfort.  Describes the pain as a dull ache and is unable to close her fist all the way.    The following portions of the patient's history were reviewed and updated as appropriate: allergies, current medications, past family history, past medical history, past social history and past surgical history.    Review of Systems   Constitutional: Negative.  Negative for chills, fatigue and fever.   HENT: Negative for congestion, ear pain, postnasal drip, rhinorrhea, sinus pain and sinus pressure.    Eyes: Negative.    Respiratory: Negative.  Negative for cough, shortness of breath and wheezing.    Cardiovascular: Negative.  Negative for chest pain, palpitations and leg swelling.   Gastrointestinal: Negative.  Negative for constipation, diarrhea, nausea and vomiting.   Endocrine: Negative.    Genitourinary: Negative.    Musculoskeletal: Negative.         Right hand pain   Skin: Negative.    Allergic/Immunologic: Negative.    Neurological:  "Positive for headaches.   Hematological: Negative.    Psychiatric/Behavioral: Positive for sleep disturbance. Negative for suicidal ideas. The patient is nervous/anxious.    All other systems reviewed and are negative.    Social History   Substance Use Topics   • Smoking status: Current Every Day Smoker     Years: 5.00     Types: Electronic Cigarette   • Smokeless tobacco: Never Used      Comment: DENIED   • Alcohol use No      Comment: DENIED     Vitals:    08/09/18 0827   BP: 112/62   Pulse: 79   SpO2: 98%   Weight: 51.3 kg (113 lb)   Height: 160 cm (63\")   No Known Allergies     Wt Readings from Last 3 Encounters:   08/09/18 51.3 kg (113 lb) (21 %, Z= -0.82)*   07/18/18 51.7 kg (114 lb) (23 %, Z= -0.75)*   04/26/18 52.2 kg (115 lb) (25 %, Z= -0.66)*     * Growth percentiles are based on [facility] 2-20 Years data.       BP Readings from Last 3 Encounters:   08/09/18 112/62   07/18/18 102/62   04/26/18 107/73     Body mass index is 20.02 kg/m².  Objective   Physical Exam   Constitutional: She is oriented to person, place, and time. Vital signs are normal. She appears well-developed and well-nourished.   HENT:   Head: Normocephalic and atraumatic.   Right Ear: Hearing, tympanic membrane, external ear and ear canal normal.   Left Ear: Hearing, tympanic membrane, external ear and ear canal normal.   Nose: Nose normal. Right sinus exhibits no maxillary sinus tenderness and no frontal sinus tenderness. Left sinus exhibits no maxillary sinus tenderness and no frontal sinus tenderness.   Mouth/Throat: Uvula is midline, oropharynx is clear and moist and mucous membranes are normal.   Eyes: Pupils are equal, round, and reactive to light. Conjunctivae, EOM and lids are normal.   Fundoscopic exam:       The right eye shows no papilledema.        The left eye shows no papilledema.   Neck: Trachea normal and phonation normal. Neck supple. No edema present. No thyromegaly present.   Cardiovascular: Normal rate, regular rhythm, S1 " normal, S2 normal, normal heart sounds, intact distal pulses and normal pulses.    Pulmonary/Chest: Effort normal and breath sounds normal.   Abdominal: Soft. Normal appearance, normal aorta and bowel sounds are normal. There is no hepatomegaly. There is no tenderness.   Musculoskeletal:        Right hand: She exhibits tenderness and bony tenderness. She exhibits normal range of motion, normal capillary refill and no swelling. Normal sensation noted. Normal strength noted. She exhibits no finger abduction, no thumb/finger opposition and no wrist extension trouble.        Hands:  Lymphadenopathy:     She has no cervical adenopathy.   Neurological: She is alert and oriented to person, place, and time. She has normal strength and normal reflexes. No cranial nerve deficit or sensory deficit. She displays a negative Romberg sign.   Reflex Scores:       Patellar reflexes are 2+ on the right side.  Skin: Skin is warm, dry and intact. Capillary refill takes less than 2 seconds.   Psychiatric: She has a normal mood and affect. Her speech is normal and behavior is normal. Judgment and thought content normal. Cognition and memory are normal.       Assessment/Plan   Ana was seen today for anxiety.    Diagnoses and all orders for this visit:    Anxiety    Bipolar I disorder, single manic episode (CMS/HCC)    Right hand pain  -     XR Hand 3+ View Right; Future      1.  Chronic and stable anxiety with bipolar disorder: She just started the Lamictal medication 2 days ago.  We'll continue the current Lamictal and Paxil medication for now.  I've encouraged her to seek psychiatric  evaluation as well.  I have asked Ana to schedule follow-up appointment in 3 weeks for reevaluation.  I suspect her headaches are related to not eating regularly.  She was in encouraged to eat regular meals.  2.  New right hand pain: She started having right hand pain several weeks ago.  She can't recall hitting anything.  She has fractured bones in  her hand before.  She will have a right hand x-ray for further evaluation.  She'll be notified of test results when completed.  She may take over-the-counter ibuprofen as needed for pain.

## 2018-11-15 ENCOUNTER — OFFICE VISIT (OUTPATIENT)
Dept: FAMILY MEDICINE CLINIC | Facility: CLINIC | Age: 18
End: 2018-11-15

## 2018-11-15 VITALS
TEMPERATURE: 98.1 F | DIASTOLIC BLOOD PRESSURE: 70 MMHG | SYSTOLIC BLOOD PRESSURE: 98 MMHG | HEIGHT: 63 IN | RESPIRATION RATE: 16 BRPM | OXYGEN SATURATION: 97 % | HEART RATE: 99 BPM | BODY MASS INDEX: 19.14 KG/M2 | WEIGHT: 108 LBS

## 2018-11-15 DIAGNOSIS — F41.9 ANXIETY: ICD-10-CM

## 2018-11-15 DIAGNOSIS — F30.9 BIPOLAR I DISORDER, SINGLE MANIC EPISODE (HCC): Primary | ICD-10-CM

## 2018-11-15 DIAGNOSIS — Z72.89 SELF MUTILATING BEHAVIOR: ICD-10-CM

## 2018-11-15 DIAGNOSIS — B00.1 HERPES LABIALIS: ICD-10-CM

## 2018-11-15 PROCEDURE — 99213 OFFICE O/P EST LOW 20 MIN: CPT | Performed by: PHYSICIAN ASSISTANT

## 2018-11-15 RX ORDER — PAROXETINE 30 MG/1
30 TABLET, FILM COATED ORAL EVERY MORNING
Qty: 30 TABLET | Refills: 2 | Status: SHIPPED | OUTPATIENT
Start: 2018-11-15 | End: 2018-12-07 | Stop reason: SDUPTHER

## 2018-11-15 RX ORDER — VALACYCLOVIR HYDROCHLORIDE 1 G/1
TABLET, FILM COATED ORAL
Qty: 8 TABLET | Refills: 0 | Status: SHIPPED | OUTPATIENT
Start: 2018-11-15 | End: 2019-01-14 | Stop reason: SDUPTHER

## 2018-11-15 RX ORDER — LAMOTRIGINE 150 MG/1
150 TABLET ORAL DAILY
Qty: 30 TABLET | Refills: 1 | Status: SHIPPED | OUTPATIENT
Start: 2018-11-15 | End: 2018-12-26

## 2018-11-15 NOTE — PROGRESS NOTES
"Subjective   Ana Enriquez is a 17 y.o. female.   Chief Complaint   Patient presents with   • Depression     management   • Mouth Lesions       History of Present Illness     Ana is a 17-year-old female who presents for depression management.  She was seen at [facility] emergency room on November 4, 2018 for self-inflicted injuries.  She had multiple lacerations to bilateral arms from cutting.  I have reviewed her lab work from the ER with her at office visit today.  States they told her to follow up with [facility] for outpatient therapy and psychiatric evaluation.  Ana states she has been going to outpatient therapy 5 days a week for 3 hours.  States she has not seen a psychiatrist.  States she still feels like hurting herself at times but denied any suicidal or homicidal ideation.  She has been taking her medication routinely.  States at times she feels \"numb\".  She is currently living with mother.  States she does have a good support system with her mother.  She is currently on her menstrual cycle.  She has lost 5 pounds since her last office visit is 92,018.  She has been on several different types of medication for her bipolar disorder.  She has been trying to get into psychiatry.  Her appetite has been slightly decreased.  Sleep has been normal.  Recently lost her job due to having to go to therapy at the [facility] 5 days a week.  States she is actively seeking employment.    Noticed a fever blister on left side of mouth yesterday.  States the area is very tender.  She has been using topical medications over-the-counter without relief of symptoms.    The following portions of the patient's history were reviewed and updated as appropriate: allergies, current medications, past family history, past medical history, past social history and past surgical history.    Review of Systems   Constitutional: Negative.    HENT: Negative.         Cold sores   Eyes: Negative.    Respiratory: Negative.  Negative for " "cough, shortness of breath and wheezing.    Cardiovascular: Negative.  Negative for chest pain and leg swelling.   Gastrointestinal: Negative.    Endocrine: Negative.    Genitourinary: Negative.    Musculoskeletal: Negative.    Skin: Negative.    Allergic/Immunologic: Negative.    Neurological: Negative.    Hematological: Negative.    Psychiatric/Behavioral: Positive for self-injury. Negative for sleep disturbance and suicidal ideas. The patient is nervous/anxious.         Depression   All other systems reviewed and are negative.    Vitals:    11/15/18 1415   BP: 98/70   BP Location: Right arm   Patient Position: Sitting   Cuff Size: Adult   Pulse: 99   Resp: 16   Temp: 98.1 °F (36.7 °C)   TempSrc: Oral   SpO2: 97%   Weight: 49 kg (108 lb)   Height: 160 cm (63\")       Wt Readings from Last 3 Encounters:   11/15/18 49 kg (108 lb) (12 %, Z= -1.18)*   08/09/18 51.3 kg (113 lb) (21 %, Z= -0.82)*   07/18/18 51.7 kg (114 lb) (23 %, Z= -0.75)*     * Growth percentiles are based on CDC (Girls, 2-20 Years) data.       BP Readings from Last 3 Encounters:   11/15/18 98/70 (4 %, Z = -1.79 /  72 %, Z = 0.57)*   08/09/18 112/62 (49 %, Z = -0.02 /  31 %, Z = -0.51)*   07/18/18 102/62 (13 %, Z = -1.12 /  31 %, Z = -0.50)*     *BP percentiles are based on the August 2017 AAP Clinical Practice Guideline for girls     Body mass index is 19.13 kg/m².  No Known Allergies    Objective   Physical Exam   Constitutional: She is oriented to person, place, and time. Vital signs are normal. She appears well-developed and well-nourished.   HENT:   Head: Normocephalic and atraumatic.   Right Ear: Hearing, tympanic membrane, external ear and ear canal normal.   Left Ear: Hearing, tympanic membrane, external ear and ear canal normal.   Nose: Nose normal. Right sinus exhibits no maxillary sinus tenderness and no frontal sinus tenderness. Left sinus exhibits no maxillary sinus tenderness and no frontal sinus tenderness.   Mouth/Throat: Uvula is " midline, oropharynx is clear and moist and mucous membranes are normal. Oral lesions present.       Eyes: Conjunctivae, EOM and lids are normal. Pupils are equal, round, and reactive to light.   Neck: Trachea normal and phonation normal. Neck supple. No edema present.   Cardiovascular: Normal rate, regular rhythm, S1 normal, S2 normal, normal heart sounds and normal pulses.   Pulmonary/Chest: Effort normal and breath sounds normal.   Abdominal: Soft. Normal appearance, normal aorta and bowel sounds are normal. There is no hepatomegaly. There is no tenderness.   Lymphadenopathy:     She has no cervical adenopathy.   Neurological: She is alert and oriented to person, place, and time.   Skin: Skin is warm, dry and intact. Capillary refill takes less than 2 seconds.   Psychiatric: Her speech is normal and behavior is normal. Judgment and thought content normal. Her mood appears anxious. Cognition and memory are normal. She exhibits a depressed mood.       Assessment/Plan   Ana was seen today for depression and mouth lesions.    Diagnoses and all orders for this visit:    Bipolar I disorder, single manic episode (CMS/HCC)  -     lamoTRIgine (LaMICtal) 150 MG tablet; Take 1 tablet by mouth Daily.  -     PARoxetine (PAXIL) 30 MG tablet; Take 1 tablet by mouth Every Morning.    Anxiety  -     lamoTRIgine (LaMICtal) 150 MG tablet; Take 1 tablet by mouth Daily.  -     PARoxetine (PAXIL) 30 MG tablet; Take 1 tablet by mouth Every Morning.    Herpes labialis  -     valACYclovir (VALTREX) 1000 MG tablet; Take 2 pills twice a day a needed for fevers blister.    Self mutilating behavior      1.  Uncontrolled bipolar disorder/anxiety with self mutilating behavior:  Reviewed her [place] ER report from November 4, 2018 with her at office visit today.  I have stressed the importance of her in seeking psychiatry appointments.  I have given her a list of names of  local psychiatrist.  Meanwhile we will decrease her Lamictal to 150 mg  daily.  She will continue her current Paxil 30 mg daily.  Prescriptions were sent to pharmacy.  She will return to office in 2 weeks for reevaluation.  Ana will continue going to the [facility] for therapy sessions daily.  2.  New herpes labialis: I have prescribed Valtrex prescription to pharmacy.  She will use as directed.

## 2018-12-07 DIAGNOSIS — F30.9 BIPOLAR I DISORDER, SINGLE MANIC EPISODE (HCC): ICD-10-CM

## 2018-12-07 DIAGNOSIS — F41.9 ANXIETY: ICD-10-CM

## 2018-12-07 RX ORDER — PAROXETINE 30 MG/1
TABLET, FILM COATED ORAL
Qty: 30 TABLET | Refills: 0 | Status: SHIPPED | OUTPATIENT
Start: 2018-12-07 | End: 2018-12-26

## 2018-12-26 ENCOUNTER — OFFICE VISIT (OUTPATIENT)
Dept: FAMILY MEDICINE CLINIC | Facility: CLINIC | Age: 18
End: 2018-12-26

## 2018-12-26 VITALS
TEMPERATURE: 98 F | RESPIRATION RATE: 16 BRPM | OXYGEN SATURATION: 98 % | HEART RATE: 98 BPM | DIASTOLIC BLOOD PRESSURE: 58 MMHG | BODY MASS INDEX: 19.05 KG/M2 | WEIGHT: 107.5 LBS | HEIGHT: 63 IN | SYSTOLIC BLOOD PRESSURE: 100 MMHG

## 2018-12-26 DIAGNOSIS — Z79.899 HIGH RISK MEDICATION USE: ICD-10-CM

## 2018-12-26 DIAGNOSIS — R63.4 WEIGHT LOSS: ICD-10-CM

## 2018-12-26 DIAGNOSIS — F30.9 BIPOLAR I DISORDER, SINGLE MANIC EPISODE (HCC): Primary | ICD-10-CM

## 2018-12-26 PROBLEM — N39.0 URINARY TRACT INFECTION WITHOUT HEMATURIA: Status: RESOLVED | Noted: 2018-04-18 | Resolved: 2018-12-26

## 2018-12-26 PROCEDURE — 99213 OFFICE O/P EST LOW 20 MIN: CPT | Performed by: PHYSICIAN ASSISTANT

## 2018-12-26 RX ORDER — PAROXETINE 30 MG/1
30 TABLET, FILM COATED ORAL EVERY MORNING
Qty: 30 TABLET | Refills: 3 | Status: SHIPPED | OUTPATIENT
Start: 2018-12-26 | End: 2019-07-22

## 2018-12-26 RX ORDER — LAMOTRIGINE 150 MG/1
150 TABLET ORAL DAILY
Qty: 30 TABLET | Refills: 3 | Status: SHIPPED | OUTPATIENT
Start: 2018-12-26 | End: 2019-07-22

## 2018-12-26 NOTE — PROGRESS NOTES
Subjective   Ana Enriquez is a 17 y.o. female.     Chief Complaint   Patient presents with   • Medication check     got out of the [place] 2 weeks ago   • Medication Question     Has not been hungry wants to know if you can put her on a medication to help be hungry       History of Present Illness     Is a 17-year-old female who presents for follow-up on her bipolar depression  [facility] discharge.  She has lost 1 pound since November 15, 2018.  Bethany was seen and [facility] emergency room in November 4, 2018 for self-inflicted injuries.  She had multiple lacerations to bilateral arms from cutting.  She has been undergoing a day program at the [facility].  She has decreased appetite for the past few weeks.  States that she doesn't get hungry.  She is doing well with the Paxil and Lamictal.  Denied any suicidal or homicidal ideation.  Working at [employer] 5 days a week.    The following portions of the patient's history were reviewed and updated as appropriate: allergies, current medications, past family history, past medical history, past social history and past surgical history.    Review of Systems   Constitutional: Negative.         Weight loss with decreased appeitite   HENT: Negative.    Eyes: Negative.    Respiratory: Negative.  Negative for cough, shortness of breath and wheezing.    Cardiovascular: Negative.  Negative for chest pain and leg swelling.   Gastrointestinal: Negative.  Negative for abdominal pain, constipation, diarrhea, nausea and vomiting.   Endocrine: Negative.    Genitourinary: Negative.    Musculoskeletal: Negative.    Skin: Negative.    Allergic/Immunologic: Negative.    Neurological: Negative.    Hematological: Negative.    Psychiatric/Behavioral: Negative.  Negative for sleep disturbance and suicidal ideas.        Bipolar depression   All other systems reviewed and are negative.      Social History     Tobacco Use   • Smoking status: Current Every Day Smoker     Years:  "5.00     Types: Electronic Cigarette   • Smokeless tobacco: Never Used   • Tobacco comment: DENIED   Substance Use Topics   • Alcohol use: No     Comment: DENIED   • Drug use: Defer       Vitals:    12/26/18 1349   BP: 100/58   Pulse: 98   Resp: 16   Temp: 98 °F (36.7 °C)   TempSrc: Oral   SpO2: 98%   Weight: 48.8 kg (107 lb 8 oz)   Height: 160 cm (63\")       Wt Readings from Last 3 Encounters:   12/26/18 48.8 kg (107 lb 8 oz) (11 %, Z= -1.22)*   11/15/18 49 kg (108 lb) (12 %, Z= -1.18)*   08/09/18 51.3 kg (113 lb) (21 %, Z= -0.82)*     * Growth percentiles are based on [facility] (Girls, 2-20 Years) data.       BP Readings from Last 3 Encounters:   12/26/18 100/58 (6 %, Z = -1.57 /  16 %, Z = -1.01)*   11/15/18 98/70 (4 %, Z = -1.79 /  72 %, Z = 0.57)*   08/09/18 112/62 (49 %, Z = -0.02 /  31 %, Z = -0.51)*     *BP percentiles are based on the August 2017 AAP Clinical Practice Guideline for girls       No Known Allergies     Body mass index is 19.04 kg/m².  Objective   Physical Exam   Constitutional: She is oriented to person, place, and time. Vital signs are normal. She appears well-developed and well-nourished.   Neck: Trachea normal and phonation normal. No thyroid mass and no thyromegaly present.   Cardiovascular: Normal rate, regular rhythm, S1 normal, S2 normal, normal heart sounds and normal pulses.   Pulmonary/Chest: Effort normal and breath sounds normal.   Abdominal: Soft. Normal appearance, normal aorta and bowel sounds are normal. There is no hepatomegaly. There is no tenderness.   Neurological: She is alert and oriented to person, place, and time.   Skin: Skin is warm, dry and intact. Capillary refill takes less than 2 seconds.   Psychiatric: She has a normal mood and affect. Her speech is normal and behavior is normal. Judgment and thought content normal. Cognition and memory are normal.       Assessment/Plan   Ana was seen today for medication check and medication question.    Diagnoses and all orders " for this visit:    Bipolar I disorder, single manic episode (CMS/HCC)  -     Thyroid Panel With TSH  -     Comprehensive Metabolic Panel  -     CBC & Differential  -     lamoTRIgine (LaMICtal) 150 MG tablet; Take 1 tablet by mouth Daily.  -     PARoxetine (PAXIL) 30 MG tablet; Take 1 tablet by mouth Every Morning.    Weight loss  -     Thyroid Panel With TSH  -     Comprehensive Metabolic Panel  -     CBC & Differential    High risk medication use  -     Thyroid Panel With TSH  -     Comprehensive Metabolic Panel  -     CBC & Differential    Ms. Ana Enriquez was seen in office today for chronic and stable bipolar disorder.  She is doing well with the Lamictal and Paxil medication.  I have sent refills to pharmacy.  Ana has continued to lose weight since taking these medications.  She will have a CBC, CMP and a thyroid profile at today's office visit for further evaluation.  I've encouraged her to do protein shakes daily to see if this will help with some weight gain.  I have turned to office in one month for weight management.  She will be notified of test results when completed.

## 2018-12-27 LAB
ALBUMIN SERPL-MCNC: 4.8 G/DL (ref 3.5–5.2)
ALBUMIN/GLOB SERPL: 2.7 G/DL
ALP SERPL-CCNC: 54 U/L (ref 40–129)
ALT SERPL-CCNC: <5 U/L (ref 5–33)
AST SERPL-CCNC: 14 U/L (ref 5–32)
BASOPHILS # BLD AUTO: 0.03 10*3/MM3 (ref 0–0.2)
BASOPHILS NFR BLD AUTO: 0.5 % (ref 0–2)
BILIRUB SERPL-MCNC: 0.3 MG/DL (ref 0.2–1.2)
BUN SERPL-MCNC: 5 MG/DL (ref 6–20)
BUN/CREAT SERPL: 6.6 (ref 7–25)
CALCIUM SERPL-MCNC: 9.3 MG/DL (ref 8.6–10.5)
CHLORIDE SERPL-SCNC: 104 MMOL/L (ref 98–107)
CO2 SERPL-SCNC: 27.1 MMOL/L (ref 22–29)
CREAT SERPL-MCNC: 0.76 MG/DL (ref 0.57–1)
EOSINOPHIL # BLD AUTO: 0.07 10*3/MM3 (ref 0.1–0.3)
EOSINOPHIL NFR BLD AUTO: 1.3 % (ref 0–4)
ERYTHROCYTE [DISTWIDTH] IN BLOOD BY AUTOMATED COUNT: 12 % (ref 11.5–14.5)
FT4I SERPL CALC-MCNC: 1.3 (ref 1.2–4.9)
GLOBULIN SER CALC-MCNC: 1.8 GM/DL
GLUCOSE SERPL-MCNC: 63 MG/DL (ref 65–99)
HCT VFR BLD AUTO: 43.9 % (ref 37–47)
HGB BLD-MCNC: 14.4 G/DL (ref 12–16)
IMM GRANULOCYTES # BLD: 0.01 10*3/MM3 (ref 0–0.03)
IMM GRANULOCYTES NFR BLD: 0.2 % (ref 0–0.5)
LYMPHOCYTES # BLD AUTO: 1.93 10*3/MM3 (ref 0.6–4.8)
LYMPHOCYTES NFR BLD AUTO: 35 % (ref 20–45)
MCH RBC QN AUTO: 31.6 PG (ref 27–31)
MCHC RBC AUTO-ENTMCNC: 32.8 G/DL (ref 31–37)
MCV RBC AUTO: 96.3 FL (ref 81–99)
MONOCYTES # BLD AUTO: 0.49 10*3/MM3 (ref 0–1)
MONOCYTES NFR BLD AUTO: 8.9 % (ref 3–8)
NEUTROPHILS # BLD AUTO: 2.99 10*3/MM3 (ref 1.5–8.3)
NEUTROPHILS NFR BLD AUTO: 54.1 % (ref 45–70)
NRBC BLD AUTO-RTO: 0 /100 WBC (ref 0–0)
PLATELET # BLD AUTO: 159 10*3/MM3 (ref 140–500)
POTASSIUM SERPL-SCNC: 4.2 MMOL/L (ref 3.5–5.2)
PROT SERPL-MCNC: 6.6 G/DL (ref 6–8.5)
RBC # BLD AUTO: 4.56 10*6/MM3 (ref 4.2–5.4)
SODIUM SERPL-SCNC: 143 MMOL/L (ref 136–145)
T3RU NFR SERPL: 28 % (ref 24–39)
T4 SERPL-MCNC: 4.7 UG/DL (ref 4.5–12)
TSH SERPL DL<=0.005 MIU/L-ACNC: 0.79 UIU/ML (ref 0.45–4.5)
WBC # BLD AUTO: 5.52 10*3/MM3 (ref 4.8–10.8)

## 2019-01-14 DIAGNOSIS — B00.1 HERPES LABIALIS: ICD-10-CM

## 2019-01-14 RX ORDER — VALACYCLOVIR HYDROCHLORIDE 1 G/1
TABLET, FILM COATED ORAL
Qty: 8 TABLET | Refills: 0 | Status: SHIPPED | OUTPATIENT
Start: 2019-01-14 | End: 2019-07-22

## 2019-07-22 ENCOUNTER — OFFICE VISIT (OUTPATIENT)
Dept: FAMILY MEDICINE CLINIC | Facility: CLINIC | Age: 19
End: 2019-07-22

## 2019-07-22 VITALS
OXYGEN SATURATION: 99 % | DIASTOLIC BLOOD PRESSURE: 62 MMHG | HEIGHT: 63 IN | WEIGHT: 94 LBS | BODY MASS INDEX: 16.66 KG/M2 | HEART RATE: 74 BPM | TEMPERATURE: 98 F | SYSTOLIC BLOOD PRESSURE: 92 MMHG | RESPIRATION RATE: 16 BRPM

## 2019-07-22 DIAGNOSIS — N92.6 MISSED PERIOD: Primary | ICD-10-CM

## 2019-07-22 PROCEDURE — 99213 OFFICE O/P EST LOW 20 MIN: CPT | Performed by: PHYSICIAN ASSISTANT

## 2019-07-22 RX ORDER — PRENATAL WITH FERROUS FUM AND FOLIC ACID 3080; 920; 120; 400; 22; 1.84; 3; 20; 10; 1; 12; 200; 27; 25; 2 [IU]/1; [IU]/1; MG/1; [IU]/1; MG/1; MG/1; MG/1; MG/1; MG/1; MG/1; UG/1; MG/1; MG/1; MG/1; MG/1
1 TABLET ORAL DAILY
Qty: 30 EACH | Refills: 6 | Status: SHIPPED | OUTPATIENT
Start: 2019-07-22 | End: 2022-05-30

## 2019-07-22 NOTE — PROGRESS NOTES
"Subjective   Ana Enriquez is a 18 y.o. female presents for   Chief Complaint   Patient presents with   • Amenorrhea     positive pregnancy test on the 19th, LMP June 28th       History of Present Illness     Ana is a 18 year old female who presents with boyfriend for missed period.  Last LMP was June 28,2019 She has had one pregnancy test at home which was positive.  She has noticed increased breast tenderness the past few weeks.  Not seen her gynecologist.  Ana states she stopped all of her medications months ago.  States she was feeling better without her psychiatric medication.  Denied any mood swings or suicidal/homicidal ideation.    The following portions of the patient's history were reviewed and updated as appropriate: allergies, current medications, past family history, past medical history, past social history, past surgical history and problem list.    Review of Systems   Constitutional: Negative.    HENT: Negative.    Eyes: Negative.    Respiratory: Negative.    Cardiovascular: Negative.    Gastrointestinal: Negative.    Endocrine: Negative.    Genitourinary: Positive for menstrual problem (missed cycle).   Musculoskeletal: Negative.    Skin: Negative.    Allergic/Immunologic: Negative.    Neurological: Negative.    Hematological: Negative.    Psychiatric/Behavioral: Negative.    All other systems reviewed and are negative.        Vitals:    07/22/19 1512   BP: 92/62   Pulse: 74   Resp: 16   Temp: 98 °F (36.7 °C)   SpO2: 99%   Weight: 42.6 kg (94 lb)   Height: 160 cm (63\")     Wt Readings from Last 3 Encounters:   07/22/19 42.6 kg (94 lb)   12/26/18 48.8 kg (107 lb 8 oz) (11 %, Z= -1.22)*   11/15/18 49 kg (108 lb) (12 %, Z= -1.18)*     * Growth percentiles are based on CDC (Girls, 2-20 Years) data.     BP Readings from Last 3 Encounters:   07/22/19 92/62   12/26/18 100/58   11/15/18 98/70     Social History     Socioeconomic History   • Marital status: Single     Spouse name: Not on file   • " Number of children: Not on file   • Years of education: Not on file   • Highest education level: Not on file   Tobacco Use   • Smoking status: Current Every Day Smoker     Years: 5.00     Types: Electronic Cigarette   • Smokeless tobacco: Never Used   • Tobacco comment: DENIED   Substance and Sexual Activity   • Alcohol use: No     Comment: DENIED   • Drug use: Defer   • Sexual activity: Defer       No Known Allergies    Body mass index is 16.65 kg/m².    Objective   Physical Exam   Constitutional: She is oriented to person, place, and time. Vital signs are normal. She appears well-developed and well-nourished.   Neck: Trachea normal and phonation normal. Neck supple. No tracheal tenderness present. No tracheal deviation and no edema present. No thyroid mass and no thyromegaly present.   Cardiovascular: Normal rate, regular rhythm, S1 normal, S2 normal, normal heart sounds and normal pulses.   No murmur heard.  Pulmonary/Chest: Effort normal and breath sounds normal.   Abdominal: Soft. Normal appearance, normal aorta and bowel sounds are normal. There is no hepatomegaly. There is no tenderness.   Neurological: She is alert and oriented to person, place, and time.   Skin: Skin is warm, dry and intact. Capillary refill takes less than 2 seconds.   Psychiatric: She has a normal mood and affect. Her speech is normal and behavior is normal. Judgment and thought content normal. Cognition and memory are normal.       Assessment/Plan   Ana was seen today for amenorrhea.    Diagnoses and all orders for this visit:    Missed period  -     HCG, B-subunit, Quantitative  -     Prenatal Vit-Fe Fumarate-FA (PRENATAL 27-1) 27-1 MG tablet tablet; Take 1 tablet by mouth Daily.    Miss Ana Enriquez was seen in office with her boyfriend at office visit today.  She has missed 1 menstrual cycle.  She has taken a pregnancy test at home which was positive.  She will have a quantitative serum hCG level collected office visit today.  She  will be notified of test results.  I have prescribed prenatal vitamins to her pharmacy.  She was instructed to use only Tylenol and Benadryl for now.  She voiced understanding.    [facility]  [address] [place] [id number]  Dept: [phone]  Dept Fax: [phone]  Loc: [phone]  Loc Fax: [phone]

## 2019-07-24 LAB — HCG INTACT+B SERPL-ACNC: 883 MIU/ML

## 2019-07-28 ENCOUNTER — HOSPITAL ENCOUNTER (EMERGENCY)
Facility: HOSPITAL | Age: 19
Discharge: HOME | End: 2019-07-28
Attending: EMERGENCY MEDICINE | Admitting: EMERGENCY MEDICINE

## 2019-07-28 ENCOUNTER — APPOINTMENT (OUTPATIENT)
Dept: ULTRASOUND IMAGING | Facility: HOSPITAL | Age: 19
End: 2019-07-28

## 2019-07-28 VITALS
HEART RATE: 64 BPM | RESPIRATION RATE: 16 BRPM | WEIGHT: 95 LBS | HEIGHT: 63 IN | DIASTOLIC BLOOD PRESSURE: 66 MMHG | OXYGEN SATURATION: 100 % | SYSTOLIC BLOOD PRESSURE: 103 MMHG | BODY MASS INDEX: 16.83 KG/M2 | TEMPERATURE: 97.8 F

## 2019-07-28 DIAGNOSIS — B96.89 BACTERIAL VAGINOSIS: ICD-10-CM

## 2019-07-28 DIAGNOSIS — O20.0 THREATENED ABORTION: Primary | ICD-10-CM

## 2019-07-28 DIAGNOSIS — N76.0 BACTERIAL VAGINOSIS: ICD-10-CM

## 2019-07-28 LAB
ABO GROUP BLD: NORMAL
CLUE CELLS SPEC QL WET PREP: ABNORMAL
HCG INTACT+B SERPL-ACNC: 1647 MIU/ML
HOLD SPECIMEN: NORMAL
HOLD SPECIMEN: NORMAL
HYDATID CYST SPEC WET PREP: ABNORMAL
RH BLD: POSITIVE
T VAGINALIS SPEC QL WET PREP: ABNORMAL
WBC SPEC QL WET PREP: ABNORMAL
WHOLE BLOOD HOLD SPECIMEN: NORMAL
WHOLE BLOOD HOLD SPECIMEN: NORMAL
YEAST GENITAL QL WET PREP: ABNORMAL

## 2019-07-28 PROCEDURE — 99283 EMERGENCY DEPT VISIT LOW MDM: CPT

## 2019-07-28 PROCEDURE — 76817 TRANSVAGINAL US OBSTETRIC: CPT

## 2019-07-28 PROCEDURE — 86900 BLOOD TYPING SEROLOGIC ABO: CPT | Performed by: PHYSICIAN ASSISTANT

## 2019-07-28 PROCEDURE — 36415 COLL VENOUS BLD VENIPUNCTURE: CPT

## 2019-07-28 PROCEDURE — 99284 EMERGENCY DEPT VISIT MOD MDM: CPT | Performed by: PHYSICIAN ASSISTANT

## 2019-07-28 PROCEDURE — 86901 BLOOD TYPING SEROLOGIC RH(D): CPT | Performed by: PHYSICIAN ASSISTANT

## 2019-07-28 PROCEDURE — 84702 CHORIONIC GONADOTROPIN TEST: CPT | Performed by: PHYSICIAN ASSISTANT

## 2019-07-28 PROCEDURE — 87491 CHLMYD TRACH DNA AMP PROBE: CPT | Performed by: PHYSICIAN ASSISTANT

## 2019-07-28 PROCEDURE — 87591 N.GONORRHOEAE DNA AMP PROB: CPT | Performed by: PHYSICIAN ASSISTANT

## 2019-07-28 PROCEDURE — 87210 SMEAR WET MOUNT SALINE/INK: CPT | Performed by: PHYSICIAN ASSISTANT

## 2019-07-28 RX ORDER — METRONIDAZOLE 500 MG/1
500 TABLET ORAL 2 TIMES DAILY
Qty: 14 TABLET | Refills: 0 | Status: SHIPPED | OUTPATIENT
Start: 2019-07-28 | End: 2019-08-04

## 2019-07-30 ENCOUNTER — OFFICE VISIT (OUTPATIENT)
Dept: OBSTETRICS AND GYNECOLOGY | Facility: CLINIC | Age: 19
End: 2019-07-30

## 2019-07-30 VITALS
WEIGHT: 94 LBS | HEIGHT: 63 IN | SYSTOLIC BLOOD PRESSURE: 130 MMHG | DIASTOLIC BLOOD PRESSURE: 74 MMHG | BODY MASS INDEX: 16.66 KG/M2

## 2019-07-30 DIAGNOSIS — Z13.9 SCREENING FOR CONDITION: ICD-10-CM

## 2019-07-30 DIAGNOSIS — O20.0 THREATENED ABORTION: Primary | ICD-10-CM

## 2019-07-30 LAB
B-HCG UR QL: POSITIVE
BILIRUB BLD-MCNC: NEGATIVE MG/DL
CLARITY, POC: CLEAR
COLOR UR: YELLOW
GLUCOSE UR STRIP-MCNC: NEGATIVE MG/DL
HCG INTACT+B SERPL-ACNC: NORMAL MIU/ML
INTERNAL NEGATIVE CONTROL: NEGATIVE
INTERNAL POSITIVE CONTROL: POSITIVE
KETONES UR QL: NEGATIVE
LEUKOCYTE EST, POC: NEGATIVE
Lab: ABNORMAL
NITRITE UR-MCNC: NEGATIVE MG/ML
PH UR: 6 [PH] (ref 5–8)
PROT UR STRIP-MCNC: NEGATIVE MG/DL
RBC # UR STRIP: ABNORMAL /UL
SP GR UR: 1.02 (ref 1–1.03)
UROBILINOGEN UR QL: NORMAL

## 2019-07-30 PROCEDURE — 81025 URINE PREGNANCY TEST: CPT | Performed by: OBSTETRICS & GYNECOLOGY

## 2019-07-30 PROCEDURE — 99204 OFFICE O/P NEW MOD 45 MIN: CPT | Performed by: OBSTETRICS & GYNECOLOGY

## 2019-07-30 PROCEDURE — 81002 URINALYSIS NONAUTO W/O SCOPE: CPT | Performed by: OBSTETRICS & GYNECOLOGY

## 2019-07-30 NOTE — PROGRESS NOTES
"PROBLEM VISIT    Chief Complaint:      Ana Enriquez is a 18 y.o. patient who presents for follow up from ER due to a threatened AB. Pt is a new pt to me. Definite LMP 6/28/2019. Pt has been having VB. She was seen in the ER on 7/28/2019 and her quant was 1647. TVUS performed revealed no IUP and no adnexal masses. Pt is reporting some cramping- mild. Still with VB but only when she urinates. Rh positive.   Chief Complaint   Patient presents with   • Vaginal Bleeding             The following portions of the patient's history were reviewed and updated as appropriate: allergies, current medications and problem list.    Review of Systems   Constitutional: Negative for chills, fatigue and fever.   Respiratory: Negative for chest tightness and shortness of breath.    Cardiovascular: Negative for chest pain and leg swelling.   Gastrointestinal: Negative for abdominal distention, abdominal pain, nausea and vomiting.   Genitourinary: Positive for menstrual problem and vaginal bleeding. Negative for pelvic pain, vaginal discharge and vaginal pain.   Musculoskeletal: Negative for gait problem.   Skin: Negative for color change and rash.   Allergic/Immunologic: Negative for immunocompromised state.   Neurological: Negative for dizziness, light-headedness and headaches.   Hematological: Does not bruise/bleed easily.   Psychiatric/Behavioral: The patient is nervous/anxious.        /74   Ht 160 cm (62.99\")   Wt 42.6 kg (94 lb)   LMP 06/28/2019   BMI 16.66 kg/m²     Physical Exam   Constitutional: She is oriented to person, place, and time. She appears well-developed and well-nourished. No distress.   HENT:   Head: Normocephalic and atraumatic.   Cardiovascular: Normal rate, regular rhythm and normal heart sounds.   Pulmonary/Chest: Effort normal and breath sounds normal. No respiratory distress.   Abdominal: Soft. She exhibits no distension and no mass. There is no tenderness. There is no rebound and no guarding. "   Genitourinary: There is no rash, tenderness, lesion or injury on the right labia. There is no rash, tenderness, lesion or injury on the left labia. Uterus is not deviated, not enlarged, not fixed and not tender. Cervix exhibits no motion tenderness, no discharge and no friability. Right adnexum displays no mass, no tenderness and no fullness. Left adnexum displays no mass, no tenderness and no fullness. There is bleeding in the vagina. No erythema or tenderness in the vagina. No foreign body in the vagina. No signs of injury around the vagina. No vaginal discharge found.   Musculoskeletal: Normal range of motion. She exhibits no edema, tenderness or deformity.   Neurological: She is alert and oriented to person, place, and time. No cranial nerve deficit. Coordination normal.   Skin: Skin is warm. She is not diaphoretic. No erythema. No pallor.   Psychiatric: She has a normal mood and affect. Her behavior is normal. Judgment and thought content normal.   Vitals reviewed.        Assessment/Plan   Ana was seen today for vaginal bleeding.    Diagnoses and all orders for this visit:    Threatened   -     HCG, B-subunit, Quantitative    Screening for condition  -     POC Urinalysis Dipstick  -     POC Pregnancy, Urine    19yo  with threatened AB    1) Threatened AB: TVUS performed in ER on 2019 revealed no IUP or GS.  No adnexal masses. BHCG 2019 was 883 and on 2019 it increased to 1,647. Check BHCG today. Rh positive. Pt has light bleeding on PE. Pt given precautions to call with increased bleeding or any pelvic pain.    2) gyn HM: GCCT neg    3) BV: On Flagyl               No Follow-up on file.      Neela Darnell DO    2019  6:38 PM

## 2019-07-31 LAB
C TRACH RRNA SPEC DONR QL NAA+PROBE: NEGATIVE
N GONORRHOEA DNA SPEC QL NAA+PROBE: NEGATIVE

## 2019-08-01 ENCOUNTER — OFFICE VISIT (OUTPATIENT)
Dept: OBSTETRICS AND GYNECOLOGY | Facility: CLINIC | Age: 19
End: 2019-08-01

## 2019-08-01 ENCOUNTER — PROCEDURE VISIT (OUTPATIENT)
Dept: OBSTETRICS AND GYNECOLOGY | Facility: CLINIC | Age: 19
End: 2019-08-01

## 2019-08-01 VITALS
HEIGHT: 63 IN | SYSTOLIC BLOOD PRESSURE: 98 MMHG | BODY MASS INDEX: 16.3 KG/M2 | WEIGHT: 92 LBS | DIASTOLIC BLOOD PRESSURE: 60 MMHG

## 2019-08-01 DIAGNOSIS — O00.90 ECTOPIC PREGNANCY WITHOUT INTRAUTERINE PREGNANCY, UNSPECIFIED LOCATION: Primary | ICD-10-CM

## 2019-08-01 DIAGNOSIS — O20.0 THREATENED ABORTION: Primary | ICD-10-CM

## 2019-08-01 LAB — HCG INTACT+B SERPL-ACNC: NORMAL MIU/ML

## 2019-08-01 PROCEDURE — 76817 TRANSVAGINAL US OBSTETRIC: CPT | Performed by: OBSTETRICS & GYNECOLOGY

## 2019-08-01 PROCEDURE — 99214 OFFICE O/P EST MOD 30 MIN: CPT | Performed by: OBSTETRICS & GYNECOLOGY

## 2019-08-01 NOTE — PROGRESS NOTES
"PROBLEM VISIT    Chief Complaint: Suspected ectopic pregnancy      Ana Enriquez is a 18 y.o. patient who presents for follow up of abnormally rising BHCG quants and TVUS revealing no IUP and thin EM lining. Pt reports she feels fine just upset about pregnancy. She denies any abdominal or pelvic pain. She is still bleeding although very lightly.   Chief Complaint   Patient presents with   • Follow-up             The following portions of the patient's history were reviewed and updated as appropriate: allergies, current medications and problem list.    Review of Systems   Respiratory: Negative for chest tightness and shortness of breath.    Cardiovascular: Negative for chest pain and leg swelling.   Gastrointestinal: Negative for abdominal distention, abdominal pain, anal bleeding, blood in stool, constipation, diarrhea, nausea and vomiting.   Genitourinary: Positive for menstrual problem and vaginal bleeding. Negative for pelvic pain, vaginal discharge and vaginal pain.       BP 98/60   Ht 160 cm (62.99\")   Wt 41.7 kg (92 lb)   LMP 06/28/2019   BMI 16.30 kg/m²     Physical Exam   Constitutional: She is oriented to person, place, and time. She appears well-developed and well-nourished. No distress.   Cardiovascular: Normal rate, regular rhythm and normal heart sounds.   Pulmonary/Chest: Effort normal and breath sounds normal. No stridor. No respiratory distress. She has no wheezes. She has no rales.   Abdominal: Soft. Normal appearance. She exhibits no distension. There is no tenderness. There is no rigidity and no guarding.   No acute abdomen   Musculoskeletal: She exhibits no edema or tenderness.   Neurological: She is alert and oriented to person, place, and time. No cranial nerve deficit. Coordination normal.   Skin: She is not diaphoretic.   Vitals reviewed.        Assessment/Plan   Ana was seen today for follow-up.    Diagnoses and all orders for this visit:    Ectopic pregnancy without intrauterine " pregnancy, unspecified location  -     HCG, B-subunit, Quantitative    19yo with suspected ectopic pregnancy    BHCG increased from 1647 to 1787 over 2 days. TVUS performed today reveals EM lining 2mm. No adnexal masses. Moderate amount of free fluid.  PE benign. No acute abdomen.  Discussed suspicion of ectopic pregnancy of unknown location.  Will check BHCG quant again   Reviewed MTX as treatment that pt is good candidate for. She has a recent normal CBC and CMP.  Pt does not want to get injection today and plans to fu tomorrow at [facility].  Ectopic precautions reviewed again with pt.   Pt presents with her grandmother today who has medical background and understands plan and agrees with it.               No Follow-up on file.      Neela Darnell DO    8/4/2019  10:49 PM

## 2019-08-02 DIAGNOSIS — O00.90 ECTOPIC PREGNANCY WITHOUT INTRAUTERINE PREGNANCY, UNSPECIFIED LOCATION: Primary | ICD-10-CM

## 2019-08-05 ENCOUNTER — RESULTS ENCOUNTER (OUTPATIENT)
Dept: OBSTETRICS AND GYNECOLOGY | Facility: CLINIC | Age: 19
End: 2019-08-05

## 2019-08-05 DIAGNOSIS — O00.90 ECTOPIC PREGNANCY WITHOUT INTRAUTERINE PREGNANCY, UNSPECIFIED LOCATION: ICD-10-CM

## 2019-08-06 ENCOUNTER — TELEPHONE (OUTPATIENT)
Dept: OBSTETRICS AND GYNECOLOGY | Facility: CLINIC | Age: 19
End: 2019-08-06

## 2019-08-06 NOTE — TELEPHONE ENCOUNTER
This pt was supposed to come in yesterday for a BHCG quant. It is very important that she come in as I am trying to determine if she needs methotrexate for a suspected ectopic pregnancy.

## 2019-08-12 ENCOUNTER — OFFICE VISIT (OUTPATIENT)
Dept: OBSTETRICS AND GYNECOLOGY | Facility: CLINIC | Age: 19
End: 2019-08-12

## 2019-08-12 VITALS
HEIGHT: 63 IN | BODY MASS INDEX: 16.8 KG/M2 | WEIGHT: 94.8 LBS | DIASTOLIC BLOOD PRESSURE: 60 MMHG | SYSTOLIC BLOOD PRESSURE: 104 MMHG

## 2019-08-12 DIAGNOSIS — O00.80 OTHER ECTOPIC PREGNANCY WITHOUT INTRAUTERINE PREGNANCY: Primary | ICD-10-CM

## 2019-08-12 DIAGNOSIS — Z30.017 ENCOUNTER FOR INITIAL PRESCRIPTION OF IMPLANTABLE SUBDERMAL CONTRACEPTIVE: ICD-10-CM

## 2019-08-12 LAB — HCG INTACT+B SERPL-ACNC: 156.1 MIU/ML

## 2019-08-12 PROCEDURE — 11981 INSERTION DRUG DLVR IMPLANT: CPT | Performed by: OBSTETRICS & GYNECOLOGY

## 2019-08-12 PROCEDURE — 99213 OFFICE O/P EST LOW 20 MIN: CPT | Performed by: OBSTETRICS & GYNECOLOGY

## 2019-08-12 NOTE — PROGRESS NOTES
Nexplanon Insertion:    Chief Complaint: Nexplanon Insertion    Procedures      Pre Dx: 1) Nexplanon Insertion   Post Dx: 1) Nexplanon Insertion     Risks, benefits, alternatives explained. All questions answered. Consents signed.  The area for insertion prepped with betadine in a sterile fashion. About 3 mL of 1% lidocaine.     The insertion site was identified approximately 6-8 cm proximal to the elbow crease in the underside of the upper arm overlying the groove between the biceps and triceps muscles. The skin at the insertion site was stretched by my thumb and index finger. Then inserted the needle tip, bevel side up, at an angle not greater than 20° to the skin surface, just until the skin was penetrated. The applicator was then lowered so that it was parallel to the arm. To minimize the chance of deep incision, the skin was tented upwards with the tip of the needle. The needle was then gently inserted to the full length. Then fixed the device in place on the arm with one hand. I then slowly and carefully retracted the needle cannula back along the length of the device after pushing the release button. The obturator was seen in the device to determine that the nexplanon had been released.     Both the patient and I were easily able to feel the device under the skin. Steri-Strips were applied at the site, and the arm was gently wrapped with gauze. Pt instructed to keep bandage on for 12-24 hrs.    There were no complications.   The patient tolerated the procedure well.     She was given a reminder card. She was advised to use condoms as a backup method for at least a week after insertion.    Expectations, warning signs and limitations reviewed.     Neela Darnell DO    8/12/2019  1:26 PM

## 2019-08-12 NOTE — PROGRESS NOTES
"PROBLEM VISIT    Chief Complaint: suspected ectopic pregnancy of uncertain location      Ana Enriquez is a 18 y.o. patient  with LMP 2019 with abnormally increasing BHCG quant levels and EM lining of 2mm. Pt was going to proceed with MTX treatment but her levels began decreasing on their own with her last draw being from 1000 to 219 on 2019. Pt has had some vaginal spotting and some cramping only.   Chief Complaint   Patient presents with   • Follow-up             The following portions of the patient's history were reviewed and updated as appropriate: allergies, current medications and problem list.    Review of Systems   Gastrointestinal: Negative for abdominal distention, abdominal pain, nausea and vomiting.   Genitourinary: Positive for menstrual problem and vaginal bleeding. Negative for pelvic pain.       /60   Ht 160 cm (62.99\")   Wt 43 kg (94 lb 12.8 oz)   LMP 2019   Breastfeeding? No   BMI 16.80 kg/m²     Physical Exam   Constitutional: She is oriented to person, place, and time. She appears well-developed and well-nourished. No distress.   Abdominal: Soft. Normal appearance. She exhibits no distension. There is no tenderness. There is no rigidity and no guarding.   Neurological: She is alert and oriented to person, place, and time. No cranial nerve deficit. Coordination normal.   Skin: She is not diaphoretic.   Psychiatric: She has a normal mood and affect. Her behavior is normal. Judgment and thought content normal.   Vitals reviewed.        Assessment/Plan   Ana was seen today for follow-up.    Diagnoses and all orders for this visit:    Other ectopic pregnancy without intrauterine pregnancy  -     HCG, B-subunit, Quantitative    Encounter for initial prescription of implantable subdermal contraceptive  -     Etonogestrel (NEXPLANON) 68 MG subdermal implant    17yo  with suspected ectopic pregnancy of uncertain location    1) Ectopic pregnancy of uncertain " location: Pt dx by abnormaling increasing BHCG quant levels and EM lining 2mm and no IUP. Pt was going to proceed with MTX when her levels started to decrease on their own. Pt has been asymptomatic. Her levels have continued to decrease and her as high as 1700 and then dropped to 1000 and last was 219 on 8/6/2019. Will check BHCG quant again today. Pt remains asymptomatic.    2) Contraception: Discussion about family planning and contraception had today. Pt does not desire pregnancy at this time. She cannot remember to take a pill. Pt desires Nexplanon placement. Device placed without difficulty today- see procedure note.    3) Gyn HM: FU 6 months for annual exam/pap smear               Return in about 6 months (around 2/12/2020) for Annual physical.      Neela Darnell DO    8/12/2019  5:10 PM

## 2020-04-14 ENCOUNTER — TELEPHONE (OUTPATIENT)
Dept: OBSTETRICS AND GYNECOLOGY | Facility: CLINIC | Age: 20
End: 2020-04-14

## 2020-04-14 NOTE — TELEPHONE ENCOUNTER
PT HAS HAD NEXPLANON DEVICE SINCE 08/12/19 AND SHE IS EXPERIENCING NUMBNESS IN HER ARM AND AND SHE STATES IT IS CAUSING OTHER SIDE EFFECTS SUCH AS ENLARGED BREAST FREQUENT URINATION AND A KNOT ON THE LEFT SIDE OF HER STOMACH, PLEASE ADVISE

## 2020-04-24 NOTE — TELEPHONE ENCOUNTER
WHEN I HAD SPOKE WITH THE PATIENT SHE HAD SAID SHE WAS UNSURE IF SHE WANTED TO HAVE TO DEVICE REMOVED BECAUSE SHE HAS NOT LIKED OTHER OPTIONS.

## 2020-04-24 NOTE — TELEPHONE ENCOUNTER
If her arm is numb then it needs to be removed. She called with all these symptoms bc of the device but doesn't want it removed? I am unsure what she wants me to do.

## 2021-04-08 ENCOUNTER — PROCEDURE VISIT (OUTPATIENT)
Dept: OBSTETRICS AND GYNECOLOGY | Facility: CLINIC | Age: 21
End: 2021-04-08

## 2021-04-08 VITALS
SYSTOLIC BLOOD PRESSURE: 102 MMHG | DIASTOLIC BLOOD PRESSURE: 60 MMHG | HEIGHT: 63 IN | WEIGHT: 105 LBS | BODY MASS INDEX: 18.61 KG/M2

## 2021-04-08 DIAGNOSIS — Z13.9 SCREENING FOR CONDITION: ICD-10-CM

## 2021-04-08 DIAGNOSIS — Z30.46 NEXPLANON REMOVAL: ICD-10-CM

## 2021-04-08 DIAGNOSIS — Z01.419 PAP SMEAR, LOW-RISK: ICD-10-CM

## 2021-04-08 DIAGNOSIS — Z01.419 ROUTINE GYNECOLOGICAL EXAMINATION: Primary | ICD-10-CM

## 2021-04-08 DIAGNOSIS — Z11.51 SPECIAL SCREENING EXAMINATION FOR HUMAN PAPILLOMAVIRUS (HPV): ICD-10-CM

## 2021-04-08 LAB
B-HCG UR QL: NEGATIVE
BILIRUB BLD-MCNC: NEGATIVE MG/DL
CLARITY, POC: CLEAR
COLOR UR: YELLOW
GLUCOSE UR STRIP-MCNC: NEGATIVE MG/DL
INTERNAL NEGATIVE CONTROL: NEGATIVE
INTERNAL POSITIVE CONTROL: POSITIVE
KETONES UR QL: NEGATIVE
LEUKOCYTE EST, POC: NEGATIVE
Lab: NORMAL
NITRITE UR-MCNC: NEGATIVE MG/ML
PH UR: 6 [PH] (ref 5–8)
PROT UR STRIP-MCNC: NEGATIVE MG/DL
RBC # UR STRIP: NEGATIVE /UL
SP GR UR: 1.02 (ref 1–1.03)
UROBILINOGEN UR QL: NORMAL

## 2021-04-08 PROCEDURE — 81025 URINE PREGNANCY TEST: CPT | Performed by: OBSTETRICS & GYNECOLOGY

## 2021-04-08 PROCEDURE — 11982 REMOVE DRUG IMPLANT DEVICE: CPT | Performed by: OBSTETRICS & GYNECOLOGY

## 2021-04-08 PROCEDURE — 99395 PREV VISIT EST AGE 18-39: CPT | Performed by: OBSTETRICS & GYNECOLOGY

## 2021-04-08 PROCEDURE — 81002 URINALYSIS NONAUTO W/O SCOPE: CPT | Performed by: OBSTETRICS & GYNECOLOGY

## 2021-04-08 RX ORDER — DICYCLOMINE HCL 20 MG
TABLET ORAL
COMMUNITY
Start: 2021-01-22 | End: 2021-04-08

## 2021-04-08 NOTE — PROGRESS NOTES
Procedure: Nexplanon removal    Chief Complaint: Nexplanon removal    Procedures      Pre Dx: 1) Nexplanon removal  Post Dx: 1) Nexplanon removal     The risks, benefits, and alternatives to remove the device have been discussed with the patient at length. All of her questions are answered. She is aware of the need for alternative means of contraception if desired. Verbal informed consent is obtained.    Able to easily palpate the device in the nondominant left arm. Additional imaging was not required. The device feels freely mobile and easily accessible. She was placed in the  supine position with her arm elevated at her side. The skin over this site is prepped with Betadine. 2-3 mL of 1% lidocaine with no epinephrine was injected.  Downward pressure was applied on the end of the implant nearest the axilla, and a 2-3 mm incision was made in a longitudinal direction of the arm at the tip of the implant closest to the elbow. The implant was then pushed gently toward the incision until the tip was visible. The fibrous capsule was opened with blunt dissection using a hemostat. The implant was grasp with a hemostat and was easily removed intact. It measured a  full 4 cm in length.    Tolerated well  No apparent complications    The skin was cleansed and dried. A Steri-Strip was applied. The arm was gently wrapped with Kerlex gauze. The patient had normal strength and sensation in her upper extremity. There was no significant bleeding. There were no complications. The patient was advised about proper care of the dressings. She was advised to call or return for complications such as fever, signs of infection, increased pain, or any other concerns.    Warning signs, limitations and expectations reviewed.     Neela Darnell DO    4/8/2021  12:53 EDT

## 2021-04-08 NOTE — PROGRESS NOTES
GYN Annual Exam     CC- Here for annual exam.     Ana Enriquez is a 20 y.o. female who presents for annual well woman exam. Periods are regular every 28-30 days, lasting several days. Dysmenorrhea:none. Cyclic symptoms include none. No intermenstrual bleeding, spotting, or discharge.  Patient is sexually active  yes - monogomous . Patient is satisfied with her contraception yes - Nexplanon but she is asking for removal for pregnancy. Pt had ectopic pregnancy in 2019 s/p MTX.    OB History        1    Para        Term                AB        Living           SAB        TAB        Ectopic        Molar        Multiple        Live Births                    Current contraception: Nexplanon  History of abnormal Pap smear: no  History of abnormal mammogram: no  Family history of uterine, colon or ovarian cancer: no  Family history of breast cancer: no    Health Maintenance   Topic Date Due   • Annual Gynecologic Pelvic and Breast Exam  Never done   • ANNUAL PHYSICAL  Never done   • Pneumococcal Vaccine 0-64 (1 of 1 - PPSV23) Never done   • HPV VACCINES (1 - 2-dose series) Never done   • HEPATITIS C SCREENING  Never done   • PAP SMEAR  Never done   • CHLAMYDIA SCREENING  2020   • INFLUENZA VACCINE  2021   • TDAP/TD VACCINES (2 - Td) 2027   • COVID-19 Vaccine  Completed   • MENINGOCOCCAL VACCINE  Aged Out       Past Medical History:   Diagnosis Date   • Cannabis abuse    • Self-mutilation     cutting       Past Surgical History:   Procedure Laterality Date   • WISDOM TOOTH EXTRACTION           Current Outpatient Medications:   •  Prenatal Vit-Fe Fumarate-FA (PRENATAL 27-) 27-1 MG tablet tablet, Take 1 tablet by mouth Daily., Disp: 30 each, Rfl: 6    No Known Allergies    Social History     Tobacco Use   • Smoking status: Current Every Day Smoker     Years: 5.00     Types: Electronic Cigarette   • Smokeless tobacco: Never Used   • Tobacco comment: DENIED   Substance Use Topics   •  "Alcohol use: No     Comment: DENIED   • Drug use: Yes     Types: Marijuana     Comment: last used pot 4 weeks ago       Family History   Problem Relation Age of Onset   • Nephrolithiasis Mother    • Other Maternal Grandmother         fibrocystic disease of breast   • Nephrolithiasis Maternal Grandmother    • Heart disease Maternal Grandmother    • Leukemia Paternal Grandmother    • Heart disease Paternal Grandmother    • Lymphoma Paternal Grandmother    • No Known Problems Sister    • Cancer Paternal Grandfather         skin       Review of Systems   Constitutional: Negative for appetite change, chills, fatigue, fever and unexpected weight change.   Gastrointestinal: Negative for abdominal distention, abdominal pain, anal bleeding, blood in stool, constipation, diarrhea, nausea and vomiting.   Genitourinary: Negative for dyspareunia, dysuria, menstrual problem, pelvic pain, vaginal bleeding, vaginal discharge and vaginal pain.       /60   Ht 160 cm (63\")   Wt 47.6 kg (105 lb)   Breastfeeding No   BMI 18.60 kg/m²     Physical Exam  Vitals reviewed.   Constitutional:       Appearance: She is well-developed.   HENT:      Mouth/Throat:      Dentition: Normal dentition. No dental caries.   Cardiovascular:      Rate and Rhythm: Normal rate and regular rhythm.      Heart sounds: Normal heart sounds.   Pulmonary:      Effort: Pulmonary effort is normal. No respiratory distress.      Breath sounds: Normal breath sounds. No stridor. No wheezing.   Chest:      Breasts:         Right: No inverted nipple, mass, nipple discharge, skin change or tenderness.         Left: No inverted nipple, mass, nipple discharge, skin change or tenderness.   Abdominal:      General: There is no distension.      Palpations: Abdomen is soft. There is no mass.      Tenderness: There is no abdominal tenderness.   Genitourinary:     Labia:         Right: No rash, tenderness or lesion.         Left: No rash, tenderness or lesion.       " Vagina: No vaginal discharge, tenderness or bleeding.      Cervix: No cervical motion tenderness, discharge or friability.      Uterus: Not deviated, not enlarged, not fixed and not tender.       Adnexa:         Right: No mass, tenderness or fullness.          Left: No mass, tenderness or fullness.     Musculoskeletal:         General: No tenderness. Normal range of motion.   Skin:     General: Skin is warm.      Findings: No erythema or rash.   Neurological:      Mental Status: She is alert and oriented to person, place, and time.      Cranial Nerves: No cranial nerve deficit.      Coordination: Coordination normal.   Psychiatric:         Behavior: Behavior normal.         Thought Content: Thought content normal.         Judgment: Judgment normal.            Assessment/Plan    1) GYN HM: Check pap smear. SBE demonstrated and encouraged.  2) STD screening: Check GCCT. Desires full STD testing  3) Contraception: Nexplanon in place. Desires removal today for pregnancy. Nexplanon removed. Pt tolerated well. No complications. See procedure note.   4) Family Planning: Hx of ectopic pregnancy s/p MTX 8/2019. Pt desires pregnancy. She is on PNV. Pt aware she will need serial HCG to ensure she does not have another ectopic pregnancy.   5) Diet and Exercise discussed  6) Smoking Status: nonsmoker  7) Social: Works at [email or web address]. Been dating boyfriend since 2016.  8) Follow up prn and 1 year       Diagnoses and all orders for this visit:    Routine gynecological examination  -     IGP,CtNgTv,rfx Aptima HPV ASCU    Screening for condition  -     POC Pregnancy, Urine  -     POC Urinalysis Dipstick    Special screening examination for human papillomavirus (HPV)  -     IGP,CtNgTv,rfx Aptima HPV ASCU    Pap smear, low-risk  -     IGP,CtNgTv,rfx Aptima HPV ASCU    Nexplanon removal    Other orders  -     Discontinue: dicyclomine (BENTYL) 20 MG tablet          Neela Darnell DO  4/8/2021  12:41 EDT

## 2021-04-10 LAB
C TRACH RRNA CVX QL NAA+PROBE: NEGATIVE
CONV .: NORMAL
CYTOLOGIST CVX/VAG CYTO: NORMAL
CYTOLOGY CVX/VAG DOC CYTO: NORMAL
CYTOLOGY CVX/VAG DOC THIN PREP: NORMAL
DX ICD CODE: NORMAL
HIV 1 & 2 AB SER-IMP: NORMAL
N GONORRHOEA RRNA CVX QL NAA+PROBE: NEGATIVE
OTHER STN SPEC: NORMAL
STAT OF ADQ CVX/VAG CYTO-IMP: NORMAL
T VAGINALIS RRNA SPEC QL NAA+PROBE: NEGATIVE

## 2021-05-13 ENCOUNTER — TELEPHONE (OUTPATIENT)
Dept: OBSTETRICS AND GYNECOLOGY | Facility: CLINIC | Age: 21
End: 2021-05-13

## 2021-05-13 NOTE — TELEPHONE ENCOUNTER
Patient calling wanted me to let you know she had a positive pregnancy test and her LMP is 4/16. I got her with Holli on 6/3. Is that ok with you?

## 2021-05-13 NOTE — TELEPHONE ENCOUNTER
She is calling bc she has a hx of an ectopic pregnancy. That appt is fine but I want her to come in for serial BHCG quant levels so we can ensure no ectopic. And I want her to have US day she sees Holli. Thank you. Tell her I said congratulations!

## 2021-06-03 ENCOUNTER — OFFICE VISIT (OUTPATIENT)
Dept: OBSTETRICS AND GYNECOLOGY | Facility: CLINIC | Age: 21
End: 2021-06-03

## 2021-06-03 VITALS
SYSTOLIC BLOOD PRESSURE: 100 MMHG | BODY MASS INDEX: 18.78 KG/M2 | DIASTOLIC BLOOD PRESSURE: 64 MMHG | WEIGHT: 106 LBS | HEIGHT: 63 IN

## 2021-06-03 DIAGNOSIS — O00.80 OTHER ECTOPIC PREGNANCY WITHOUT INTRAUTERINE PREGNANCY: ICD-10-CM

## 2021-06-03 DIAGNOSIS — O21.9 NAUSEA AND VOMITING DURING PREGNANCY PRIOR TO 22 WEEKS GESTATION: ICD-10-CM

## 2021-06-03 DIAGNOSIS — Z62.810 HISTORY OF PHYSICAL ABUSE IN CHILDHOOD: ICD-10-CM

## 2021-06-03 DIAGNOSIS — N92.6 MISSED MENSES: Primary | ICD-10-CM

## 2021-06-03 PROCEDURE — 99214 OFFICE O/P EST MOD 30 MIN: CPT | Performed by: NURSE PRACTITIONER

## 2021-06-03 RX ORDER — DOXYLAMINE SUCCINATE 25 MG/1
25 TABLET ORAL NIGHTLY
Qty: 30 TABLET | Refills: 1 | Status: SHIPPED | OUTPATIENT
Start: 2021-06-03 | End: 2021-10-03

## 2021-06-03 RX ORDER — DIPHENHYDRAMINE HYDROCHLORIDE 25 MG/1
25 CAPSULE ORAL NIGHTLY
Qty: 30 TABLET | Refills: 1 | Status: SHIPPED | OUTPATIENT
Start: 2021-06-03 | End: 2021-10-03
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
## 2021-06-17 ENCOUNTER — INITIAL PRENATAL (OUTPATIENT)
Dept: OBSTETRICS AND GYNECOLOGY | Facility: CLINIC | Age: 21
End: 2021-06-17

## 2021-06-17 VITALS — WEIGHT: 107 LBS | SYSTOLIC BLOOD PRESSURE: 100 MMHG | DIASTOLIC BLOOD PRESSURE: 70 MMHG | BODY MASS INDEX: 18.95 KG/M2

## 2021-06-17 DIAGNOSIS — Z36.9 ENCOUNTER FOR ANTENATAL SCREENING, UNSPECIFIED: ICD-10-CM

## 2021-06-17 DIAGNOSIS — Z34.91 INITIAL OBSTETRIC VISIT IN FIRST TRIMESTER: Primary | ICD-10-CM

## 2021-06-17 DIAGNOSIS — O00.80 OTHER ECTOPIC PREGNANCY WITHOUT INTRAUTERINE PREGNANCY: ICD-10-CM

## 2021-06-17 LAB
EXTERNAL CYSTIC FIBROSIS: NEGATIVE
GLUCOSE UR STRIP-MCNC: NEGATIVE MG/DL
PROT UR STRIP-MCNC: NEGATIVE MG/DL

## 2021-06-17 PROCEDURE — 99214 OFFICE O/P EST MOD 30 MIN: CPT | Performed by: NURSE PRACTITIONER
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
## 2021-06-19 LAB
ABO GROUP BLD: ABNORMAL
BASOPHILS # BLD AUTO: 0.1 X10E3/UL (ref 0–0.2)
BASOPHILS NFR BLD AUTO: 1 %
BLD GP AB SCN SERPL QL: NEGATIVE
EOSINOPHIL # BLD AUTO: 0 X10E3/UL (ref 0–0.4)
EOSINOPHIL NFR BLD AUTO: 0 %
ERYTHROCYTE [DISTWIDTH] IN BLOOD BY AUTOMATED COUNT: 11.6 % (ref 11.7–15.4)
HBA1C MFR BLD: 4.7 % (ref 4.8–5.6)
HBV SURFACE AG SERPL QL IA: NEGATIVE
HCT VFR BLD AUTO: 40 % (ref 34–46.6)
HCV AB S/CO SERPL IA: <0.1 S/CO RATIO (ref 0–0.9)
HGB BLD-MCNC: 13.7 G/DL (ref 11.1–15.9)
HIV 1+2 AB+HIV1 P24 AG SERPL QL IA: NON REACTIVE
IMM GRANULOCYTES # BLD AUTO: 0 X10E3/UL (ref 0–0.1)
IMM GRANULOCYTES NFR BLD AUTO: 0 %
LYMPHOCYTES # BLD AUTO: 2 X10E3/UL (ref 0.7–3.1)
LYMPHOCYTES NFR BLD AUTO: 21 %
MCH RBC QN AUTO: 32.9 PG (ref 26.6–33)
MCHC RBC AUTO-ENTMCNC: 34.3 G/DL (ref 31.5–35.7)
MCV RBC AUTO: 96 FL (ref 79–97)
MONOCYTES # BLD AUTO: 0.7 X10E3/UL (ref 0.1–0.9)
MONOCYTES NFR BLD AUTO: 7 %
NEUTROPHILS # BLD AUTO: 6.7 X10E3/UL (ref 1.4–7)
NEUTROPHILS NFR BLD AUTO: 71 %
PLATELET # BLD AUTO: 202 X10E3/UL (ref 150–450)
RBC # BLD AUTO: 4.16 X10E6/UL (ref 3.77–5.28)
RH BLD: POSITIVE
RPR SER QL: NON REACTIVE
RUBV IGG SERPL IA-ACNC: <0.9 INDEX
WBC # BLD AUTO: 9.5 X10E3/UL (ref 3.4–10.8)

## 2021-06-20 LAB
AMPHETAMINES UR QL SCN: NEGATIVE NG/ML
BACTERIA UR CULT: NORMAL
BACTERIA UR CULT: NORMAL
BARBITURATES UR QL SCN: NEGATIVE NG/ML
BENZODIAZ UR QL SCN: NEGATIVE NG/ML
BZE UR QL SCN: NEGATIVE NG/ML
C TRACH RRNA SPEC QL NAA+PROBE: NEGATIVE
CANNABINOIDS UR QL SCN: POSITIVE NG/ML
CREAT UR-MCNC: 89.7 MG/DL (ref 20–300)
LABORATORY COMMENT REPORT: ABNORMAL
METHADONE UR QL SCN: NEGATIVE NG/ML
N GONORRHOEA RRNA SPEC QL NAA+PROBE: NEGATIVE
OPIATES UR QL SCN: NEGATIVE NG/ML
OXYCODONE+OXYMORPHONE UR QL SCN: NEGATIVE NG/ML
PCP UR QL: NEGATIVE NG/ML
PH UR: 6.5 [PH] (ref 4.5–8.9)
PROPOXYPH UR QL SCN: NEGATIVE NG/ML

## 2021-06-21 PROBLEM — R82.5 POSITIVE URINE DRUG SCREEN: Status: ACTIVE | Noted: 2021-06-21

## 2021-06-25 LAB
CYSTIC FIBROSIS MUTATION 97: NORMAL
GENE DIS ANL CARRIER INTERP-IMP: NORMAL

## 2021-06-28 LAB
CLINICAL GENETICS COUNSELING NOTE: NORMAL
CLINICAL INFO: NORMAL
ETHNIC BACKGROUND STATED: NORMAL
FMR1 GENE CGG RPT BLD/T QL: NORMAL
LAB DIRECTOR NAME PROVIDER: NORMAL
LABORATORY COMMENT REPORT: NORMAL
LABORATORY COMMENT REPORT: NORMAL
REASON FOR REFERRAL (NARRATIVE): NORMAL
REF LAB TEST METHOD: NORMAL
SMN1 GENE MUT ANL BLD/T: NORMAL
SPECIMEN SOURCE: NORMAL
TEST PERFORMANCE INFO SPEC: NORMAL
TEST PERFORMANCE INFO SPEC: NORMAL

## 2021-07-12 ENCOUNTER — ROUTINE PRENATAL (OUTPATIENT)
Dept: OBSTETRICS AND GYNECOLOGY | Facility: CLINIC | Age: 21
End: 2021-07-12

## 2021-07-12 VITALS — WEIGHT: 108 LBS | DIASTOLIC BLOOD PRESSURE: 68 MMHG | SYSTOLIC BLOOD PRESSURE: 110 MMHG | BODY MASS INDEX: 19.13 KG/M2

## 2021-07-12 DIAGNOSIS — Z36.9 ENCOUNTER FOR ANTENATAL SCREENING, UNSPECIFIED: ICD-10-CM

## 2021-07-12 DIAGNOSIS — R82.5 POSITIVE URINE DRUG SCREEN: ICD-10-CM

## 2021-07-12 DIAGNOSIS — Z34.91 PRENATAL CARE IN FIRST TRIMESTER: Primary | ICD-10-CM

## 2021-07-12 PROCEDURE — 99213 OFFICE O/P EST LOW 20 MIN: CPT | Performed by: NURSE PRACTITIONER
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
## 2021-07-12 NOTE — PATIENT INSTRUCTIONS
Alpha-Fetoprotein Test  Why am I having this test?  The alpha-fetoprotein test is most commonly used in pregnant women to help screen for birth defects in their unborn baby. It can be used to screen for birth defects, such as chromosome (DNA) abnormalities, problems with the brain or spinal cord, or problems with the abdominal wall of the unborn baby (fetus). The test can be drawn between week 15 and 20 of the pregnancy.     What is being tested?  This test measures the amount of alpha-fetoprotein (AFP) in your blood. AFP is a protein that is made by the liver. Levels can be detected in the mother's blood during pregnancy, starting at 10 weeks and peaking at 16-18 weeks of the pregnancy. Abnormal levels can sometimes be a sign of a birth defect in the baby.  Certain cancers can cause a high level of AFP in men and non-pregnant women.  What kind of sample is taken?    A blood sample is required for this test. It is usually collected by inserting a needle into a blood vessel.  How are the results reported?  Your test results will be reported as values. Your health care provider will compare your results to normal ranges that were established after testing a large group of people (reference values). Reference values may vary among labs and hospitals. For this test, common reference values are:  · Adult: Less than 40 ng/mL or less than 40 mcg/L (SI units).  · Child younger than 1 year: Less than 30 ng/mL.  If you are pregnant, the values may also vary based on how long you have been pregnant.  What do the results mean?  Results that are above the reference values in pregnant women may indicate the following for the baby:  · Neural tube defects, such as abnormalities of the spinal cord or brain.  · Abdominal wall defects.  · Multiple pregnancy such as twins.  · Fetal distress or fetal death.  Very low levels of AFP in pregnant women may indicate the following for the baby:  · Down syndrome.  · Fetal death.  Talk with  your health care provider about what your results mean.  Questions to ask your health care provider  Ask your health care provider, or the department that is doing the test:  · When will my results be ready?  · How will I get my results?  · What are my treatment options?  · What other tests do I need?  · What are my next steps?  Summary  · The alpha-fetoprotein test is done on pregnant women to help screen for birth defects in their unborn baby.  · Certain cancers can cause a high level of AFP in men and non-pregnant women.  · For this test, a blood sample is usually collected by inserting a needle into a blood vessel.  · Talk with your health care provider about what your results mean.  This information is not intended to replace advice given to you by your health care provider. Make sure you discuss any questions you have with your health care provider.  Document Released: 01/11/2006 Document Revised: 07/24/2018 Document Reviewed: 07/24/2018  Elsevier Interactive Patient Education © 2019 [employer]

## 2021-07-17 LAB

## 2021-08-11 ENCOUNTER — ROUTINE PRENATAL (OUTPATIENT)
Dept: OBSTETRICS AND GYNECOLOGY | Facility: CLINIC | Age: 21
End: 2021-08-11

## 2021-08-11 VITALS — WEIGHT: 115 LBS | BODY MASS INDEX: 20.37 KG/M2 | DIASTOLIC BLOOD PRESSURE: 62 MMHG | SYSTOLIC BLOOD PRESSURE: 102 MMHG

## 2021-08-11 DIAGNOSIS — F30.9 BIPOLAR I DISORDER, SINGLE MANIC EPISODE (HCC): ICD-10-CM

## 2021-08-11 DIAGNOSIS — Z36.9 ENCOUNTER FOR ANTENATAL SCREENING, UNSPECIFIED: Primary | ICD-10-CM

## 2021-08-11 DIAGNOSIS — F41.9 ANXIETY: ICD-10-CM

## 2021-08-11 DIAGNOSIS — R82.5 POSITIVE URINE DRUG SCREEN: ICD-10-CM

## 2021-08-11 DIAGNOSIS — Z3A.17 17 WEEKS GESTATION OF PREGNANCY: ICD-10-CM

## 2021-08-11 LAB
GLUCOSE UR STRIP-MCNC: NEGATIVE MG/DL
PROT UR STRIP-MCNC: NEGATIVE MG/DL

## 2021-08-11 PROCEDURE — 99213 OFFICE O/P EST LOW 20 MIN: CPT | Performed by: OBSTETRICS & GYNECOLOGY

## 2021-08-11 NOTE — PROGRESS NOTES
CC: Pt here for ob office visit and discussion AFP.    HPI: Ana Enriquez is a 20 y.o.  16w5d being seen today for her obstetrical visit.   Patient reports fatigue .   Fetal movement: too early. .        ROS: Pt denies visual changes, headaches, shortness of breath, chest pain, esophageal reflux, gastric pain,   nausea and vomiting, diarrhea, rashes, vaginal bleeding, edema, hip pain, pelvic pressure.     SMOKER? No    ALCOHOL? No  ILLICIT DRUGS? Yes + THC.  Counseled.     O:  /62   Wt 52.2 kg (115 lb)   LMP 2021   BMI 20.37 kg/m² , additional findings in addition to above flow sheet?: no    Data Review:  UA, flow sheet,  TESTING: u/s: no    Lab Results (last 24 hours)     ** No results found for the last 24 hours. **          A:    DIAGNOSES:  20 y.o.  16w5d  Patient Active Problem List   Diagnosis   • Abnormal finding on thyroid function test   • Bipolar I disorder, single manic episode (CMS/HCC)   • New onset seizure (CMS/HCC)   • Panic disorder without agoraphobia   • Anxiety   • Headache   • High risk medication use   • Polyphagia   • Herpes labialis   • Self mutilating behavior   • h/o ectopic pregnancy   • Nausea and vomiting during pregnancy prior to 22 weeks gestation   • Positive urine drug screen:+THC. Needs =   • Pregnancy     Diagnoses and all orders for this visit:    1. Encounter for  screening, unspecified (Primary)  -     Alpha Fetoprotein, Maternal    2. Positive urine drug screen:+THC. Needs =    3. Anxiety    4. Bipolar I disorder, single manic episode (CMS/HCC)    5. 17 weeks gestation of pregnancy      Pregnancy Assessment : Active Problem with Pregnancy anxiety, hx depression, hx THC use.    NEW PROBLEMS? Fatigue.     P:  Tests ordered for this or next visit: LABS: ua, AFP and ULTRASOUND FOR anatomy.  New Meds:No:  Advised to get COVID vaccine.  Discussed at length.    I spent 20 minutes caring for Ana on this date of service. This  time includes time spent by me in the following activities: preparing for the visit, reviewing tests, obtaining and/or reviewing a separately obtained history, performing a medically appropriate examination and/or evaluation, counseling and educating the patient/family/caregiver, ordering medications, tests, or procedures, referring and communicating with other health care professionals, documenting information in the medical record, independently interpreting results and communicating that information with the patient/family/caregiver and care coordination  Return in about 3 weeks (around 9/1/2021) for brock metcalf.    Harley Marquez MD

## 2021-08-14 LAB
AFP ADJ MOM SERPL: 1.12
AFP INTERP SERPL-IMP: NORMAL
AFP INTERP SERPL-IMP: NORMAL
AFP SERPL-MCNC: 50.4 NG/ML
AGE AT DELIVERY: 23 YR
GA METHOD: NORMAL
GA: 16.7 WEEKS
IDDM PATIENT QL: NO
LABORATORY COMMENT REPORT: NORMAL
MULTIPLE PREGNANCY: NO
NEURAL TUBE DEFECT RISK FETUS: 8371 %
RESULT: NORMAL

## 2021-08-16 PROBLEM — N92.6 MISSED MENSES: Status: RESOLVED | Noted: 2021-06-03 | Resolved: 2021-08-16

## 2021-08-16 PROBLEM — Z34.91 PRENATAL CARE IN FIRST TRIMESTER: Status: RESOLVED | Noted: 2021-07-12 | Resolved: 2021-08-16

## 2021-08-16 PROBLEM — R14.0 BLOATING: Status: RESOLVED | Noted: 2017-04-28 | Resolved: 2021-08-16

## 2021-08-16 PROBLEM — Z34.90 PREGNANCY: Status: ACTIVE | Noted: 2021-08-16

## 2021-08-16 PROBLEM — R10.84 GENERALIZED ABDOMINAL PAIN: Status: RESOLVED | Noted: 2017-04-28 | Resolved: 2021-08-16

## 2021-08-16 PROBLEM — R63.4 WEIGHT LOSS: Status: RESOLVED | Noted: 2018-12-26 | Resolved: 2021-08-16

## 2021-08-16 PROBLEM — M79.641 RIGHT HAND PAIN: Status: RESOLVED | Noted: 2018-08-09 | Resolved: 2021-08-16

## 2021-09-13 ENCOUNTER — TELEPHONE (OUTPATIENT)
Dept: OBSTETRICS AND GYNECOLOGY | Facility: HOSPITAL | Age: 21
End: 2021-09-13

## 2021-09-13 NOTE — TELEPHONE ENCOUNTER
Called pt to introduce the [facility] program.  Left chema with contact info.  This was the 4th attempt to make contact with the pt.

## 2021-09-15 ENCOUNTER — ROUTINE PRENATAL (OUTPATIENT)
Dept: OBSTETRICS AND GYNECOLOGY | Facility: CLINIC | Age: 21
End: 2021-09-15

## 2021-09-15 VITALS — WEIGHT: 128 LBS | BODY MASS INDEX: 22.67 KG/M2 | DIASTOLIC BLOOD PRESSURE: 64 MMHG | SYSTOLIC BLOOD PRESSURE: 100 MMHG

## 2021-09-15 DIAGNOSIS — Z34.92 NORMAL PREGNANCY IN SECOND TRIMESTER: Primary | ICD-10-CM

## 2021-09-15 LAB
GLUCOSE UR STRIP-MCNC: NEGATIVE MG/DL
PROT UR STRIP-MCNC: NEGATIVE MG/DL

## 2021-09-15 PROCEDURE — 99212 OFFICE O/P EST SF 10 MIN: CPT | Performed by: OBSTETRICS & GYNECOLOGY

## 2021-09-15 NOTE — PROGRESS NOTES
OB follow up     Ana Enriquez is a 20 y.o.  21w5d being seen today for her obstetrical visit.  Patient reports no bleeding, no contractions and no leaking. Fetal movement: normal.  Prenatal care complicated by history of ectopic pregnancy but no surgery.    Review of Systems  No bleeding, No cramping/contractions     /64   Wt 58.1 kg (128 lb)   LMP 2021   BMI 22.67 kg/m²     FHT: present BPM   Uterine Size: 21 cm       Assessment/Plan:    1) 20 y.o.  -pregnancy at 21w5d    2) No diagnosis found.    3) Reviewed this stage of pregnancy  4) Problem list updated     Return for US, Anatomy (ASAP), 4 week Tummy.      Nic Garcia MD    9/15/2021  15:46 EDT

## 2021-09-22 ENCOUNTER — TELEPHONE (OUTPATIENT)
Dept: OBSTETRICS AND GYNECOLOGY | Facility: HOSPITAL | Age: 21
End: 2021-09-22

## 2021-09-22 NOTE — TELEPHONE ENCOUNTER
Called pt to introduce [facility] to pt.  This was the 5th  attempt to contact the pt. Left contact info on how the pt could contact me for more info about the program.

## 2021-09-29 ENCOUNTER — TELEPHONE (OUTPATIENT)
Dept: OBSTETRICS AND GYNECOLOGY | Facility: CLINIC | Age: 21
End: 2021-09-29

## 2021-09-29 NOTE — TELEPHONE ENCOUNTER
Pt called and states she is having some cramping and she did wipe this morning and states there is some blood coming from her vagina. Pt is 23w5d. Tony states she could not schedule her , can you call pt to schedule.   Overbook ?

## 2021-09-30 NOTE — TELEPHONE ENCOUNTER
Spoke with pt, she says she seems fine now.  Just some back pain and maybe growing pains of the baby.  No bleeding.  Advised pt if she had this issue again to go ahead and go to the ER to be seen in L&D.

## 2021-10-03 ENCOUNTER — APPOINTMENT (OUTPATIENT)
Dept: GENERAL RADIOLOGY | Facility: HOSPITAL | Age: 21
End: 2021-10-03

## 2021-10-03 ENCOUNTER — NURSE TRIAGE (OUTPATIENT)
Dept: CALL CENTER | Facility: HOSPITAL | Age: 21
End: 2021-10-03

## 2021-10-03 ENCOUNTER — HOSPITAL ENCOUNTER (EMERGENCY)
Facility: HOSPITAL | Age: 21
Discharge: HOME | End: 2021-10-03
Attending: EMERGENCY MEDICINE | Admitting: EMERGENCY MEDICINE

## 2021-10-03 VITALS
TEMPERATURE: 98.7 F | WEIGHT: 130 LBS | BODY MASS INDEX: 23.04 KG/M2 | DIASTOLIC BLOOD PRESSURE: 91 MMHG | RESPIRATION RATE: 18 BRPM | OXYGEN SATURATION: 98 % | HEIGHT: 63 IN | SYSTOLIC BLOOD PRESSURE: 129 MMHG | HEART RATE: 90 BPM

## 2021-10-03 DIAGNOSIS — R07.9 CHEST PAIN, UNSPECIFIED TYPE: Primary | ICD-10-CM

## 2021-10-03 LAB
ALBUMIN SERPL-MCNC: 3.8 G/DL (ref 3.5–5.2)
ALBUMIN/GLOB SERPL: 1.5 G/DL
ALP SERPL-CCNC: 73 U/L (ref 39–117)
ALT SERPL W P-5'-P-CCNC: 11 U/L (ref 1–33)
ANION GAP SERPL CALCULATED.3IONS-SCNC: 8.1 MMOL/L (ref 5–15)
AST SERPL-CCNC: 26 U/L (ref 1–32)
BASOPHILS # BLD AUTO: 0.02 10*3/MM3 (ref 0–0.2)
BASOPHILS NFR BLD AUTO: 0.2 % (ref 0–1.5)
BILIRUB SERPL-MCNC: 0.2 MG/DL (ref 0–1.2)
BUN SERPL-MCNC: 3 MG/DL (ref 6–20)
BUN/CREAT SERPL: 3.8 (ref 7–25)
CALCIUM SPEC-SCNC: 8.9 MG/DL (ref 8.6–10.5)
CHLORIDE SERPL-SCNC: 104 MMOL/L (ref 98–107)
CO2 SERPL-SCNC: 24.9 MMOL/L (ref 22–29)
CREAT SERPL-MCNC: 0.78 MG/DL (ref 0.57–1)
DEPRECATED RDW RBC AUTO: 45.8 FL (ref 37–54)
EOSINOPHIL # BLD AUTO: 0.13 10*3/MM3 (ref 0–0.4)
EOSINOPHIL NFR BLD AUTO: 1.1 % (ref 0.3–6.2)
ERYTHROCYTE [DISTWIDTH] IN BLOOD BY AUTOMATED COUNT: 12.7 % (ref 12.3–15.4)
GFR SERPL CREATININE-BSD FRML MDRD: 92 ML/MIN/1.73
GLOBULIN UR ELPH-MCNC: 2.5 GM/DL
GLUCOSE SERPL-MCNC: 73 MG/DL (ref 65–99)
HCT VFR BLD AUTO: 35 % (ref 34–46.6)
HGB BLD-MCNC: 11.7 G/DL (ref 12–15.9)
IMM GRANULOCYTES # BLD AUTO: 0.13 10*3/MM3 (ref 0–0.05)
IMM GRANULOCYTES NFR BLD AUTO: 1.1 % (ref 0–0.5)
LYMPHOCYTES # BLD AUTO: 1.22 10*3/MM3 (ref 0.7–3.1)
LYMPHOCYTES NFR BLD AUTO: 10.7 % (ref 19.6–45.3)
MCH RBC QN AUTO: 32.6 PG (ref 26.6–33)
MCHC RBC AUTO-ENTMCNC: 33.4 G/DL (ref 31.5–35.7)
MCV RBC AUTO: 97.5 FL (ref 79–97)
MONOCYTES # BLD AUTO: 1.62 10*3/MM3 (ref 0.1–0.9)
MONOCYTES NFR BLD AUTO: 14.3 % (ref 5–12)
NEUTROPHILS NFR BLD AUTO: 72.6 % (ref 42.7–76)
NEUTROPHILS NFR BLD AUTO: 8.23 10*3/MM3 (ref 1.7–7)
PLATELET # BLD AUTO: 203 10*3/MM3 (ref 140–450)
PMV BLD AUTO: 11 FL (ref 6–12)
POTASSIUM SERPL-SCNC: 3.5 MMOL/L (ref 3.5–5.2)
PROT SERPL-MCNC: 6.3 G/DL (ref 6–8.5)
QT INTERVAL: 344 MS
RBC # BLD AUTO: 3.59 10*6/MM3 (ref 3.77–5.28)
SODIUM SERPL-SCNC: 137 MMOL/L (ref 136–145)
TROPONIN T SERPL-MCNC: <0.01 NG/ML (ref 0–0.03)
WBC # BLD AUTO: 11.35 10*3/MM3 (ref 3.4–10.8)

## 2021-10-03 PROCEDURE — 71045 X-RAY EXAM CHEST 1 VIEW: CPT

## 2021-10-03 PROCEDURE — 93005 ELECTROCARDIOGRAM TRACING: CPT | Performed by: EMERGENCY MEDICINE

## 2021-10-03 PROCEDURE — 80053 COMPREHEN METABOLIC PANEL: CPT | Performed by: EMERGENCY MEDICINE

## 2021-10-03 PROCEDURE — 84484 ASSAY OF TROPONIN QUANT: CPT | Performed by: EMERGENCY MEDICINE

## 2021-10-03 PROCEDURE — 99283 EMERGENCY DEPT VISIT LOW MDM: CPT

## 2021-10-03 PROCEDURE — 85025 COMPLETE CBC W/AUTO DIFF WBC: CPT | Performed by: EMERGENCY MEDICINE

## 2021-10-03 PROCEDURE — 93010 ELECTROCARDIOGRAM REPORT: CPT | Performed by: INTERNAL MEDICINE

## 2021-10-03 RX ORDER — ACETAMINOPHEN 500 MG
1000 TABLET ORAL ONCE
Status: COMPLETED | OUTPATIENT
Start: 2021-10-03 | End: 2021-10-03

## 2021-10-03 RX ADMIN — ACETAMINOPHEN 1000 MG: 500 TABLET ORAL at 15:00

## 2021-10-03 NOTE — ED PROVIDER NOTES
EMERGENCY DEPARTMENT ENCOUNTER      Room Number: 08/08    History is provided by the patient, no translation services needed    HPI:    Chief complaint: Chest pain    Location: Mid sternum    Quality/Severity: Aching/7 out of 10    Timing/Duration: 3 days    Modifying Factors: Worse with taking a deep breath    Associated Symptoms: No shortness of breath, no fever, no cough    Narrative: Pt is a 20 y.o. female who presents complaining of chest pain x2 days.  Patient is that she is 24 weeks pregnant.  Pain is aching in nature.  Patient is her pain is worse with taking a deep breath.  Patient denies any shortness of breath.  Patient denies any cough.  Patient denies any fever or chills.  Patient denies any known injury.      PMD: Nimco Tate PA-C    REVIEW OF SYSTEMS  Review of Systems   Constitutional: Negative for chills and fever.   Eyes: Negative for pain and visual disturbance.   Respiratory: Positive for shortness of breath. Negative for cough.    Cardiovascular: Positive for chest pain. Negative for leg swelling.   Gastrointestinal: Negative for abdominal pain, constipation, diarrhea, nausea and vomiting.   Genitourinary: Negative for dysuria and flank pain.   Musculoskeletal: Negative for arthralgias and myalgias.   Skin: Negative for rash and wound.   Neurological: Negative for dizziness, syncope and headaches.   Psychiatric/Behavioral: Negative for suicidal ideas. The patient is not nervous/anxious.          PAST MEDICAL HISTORY  Active Ambulatory Problems     Diagnosis Date Noted   • Abnormal finding on thyroid function test 01/31/2016   • Bipolar I disorder, single manic episode (HCC) 01/31/2016   • New onset seizure (HCC) 01/31/2016   • Panic disorder without agoraphobia 01/31/2016   • Anxiety 04/05/2016   • Headache 09/16/2016   • High risk medication use 01/05/2018   • Polyphagia 04/18/2018   • Herpes labialis 11/15/2018   • Self mutilating behavior 11/15/2018   • h/o ectopic pregnancy  06/03/2021   • Nausea and vomiting during pregnancy prior to 22 weeks gestation 06/03/2021   • Positive urine drug screen:+THC. Needs = 06/21/2021   • Pregnancy 08/16/2021     Resolved Ambulatory Problems     Diagnosis Date Noted   • Dysuria 01/31/2016   • Breast lump 01/31/2016   • Urine frequency 01/31/2016   • Urinary hesitancy 01/31/2016   • Panic 04/05/2016   • Fatigue 09/16/2016   • Nausea 09/16/2016   • Generalized abdominal pain 04/28/2017   • Bloating 04/28/2017   • Chest pain, mid sternal 04/18/2018   • Acute midline low back pain without sciatica 04/18/2018   • Urinary tract infection without hematuria 04/18/2018   • Right hand pain 08/09/2018   • Weight loss 12/26/2018   • Missed menses 06/03/2021   • Prenatal care in first trimester 07/12/2021     Past Medical History:   Diagnosis Date   • Cannabis abuse    • Self-mutilation        PAST SURGICAL HISTORY  Past Surgical History:   Procedure Laterality Date   • WISDOM TOOTH EXTRACTION         FAMILY HISTORY  Family History   Problem Relation Age of Onset   • Nephrolithiasis Mother    • Other Maternal Grandmother         fibrocystic disease of breast   • Nephrolithiasis Maternal Grandmother    • Heart disease Maternal Grandmother    • Leukemia Paternal Grandmother    • Heart disease Paternal Grandmother    • Lymphoma Paternal Grandmother    • No Known Problems Sister    • Cancer Paternal Grandfather         skin       SOCIAL HISTORY  Social History     Socioeconomic History   • Marital status: Single     Spouse name: Not on file   • Number of children: Not on file   • Years of education: Not on file   • Highest education level: Not on file   Tobacco Use   • Smoking status: Former Smoker     Years: 5.00     Types: Electronic Cigarette   • Smokeless tobacco: Never Used   • Tobacco comment: DENIED   Substance and Sexual Activity   • Alcohol use: No     Comment: DENIED   • Drug use: Yes     Types: Marijuana     Comment: last used pot 4 weeks ago   •  Sexual activity: Yes     Partners: Male       ALLERGIES  Penicillins    No current facility-administered medications for this encounter.    Current Outpatient Medications:   •  Prenatal Vit-Fe Fumarate-FA (PRENATAL 27-1) 27-1 MG tablet tablet, Take 1 tablet by mouth Daily., Disp: 30 each, Rfl: 6    PHYSICAL EXAM  ED Triage Vitals [10/03/21 1447]   Temp Heart Rate Resp BP SpO2   -- 90 18 129/91 98 %      Temp src Heart Rate Source Patient Position BP Location FiO2 (%)   -- -- Sitting Right arm --       Physical Exam  Vitals and nursing note reviewed.   HENT:      Head: Normocephalic and atraumatic.   Eyes:      Conjunctiva/sclera: Conjunctivae normal.   Cardiovascular:      Rate and Rhythm: Normal rate and regular rhythm.      Heart sounds: Normal heart sounds.   Pulmonary:      Effort: Pulmonary effort is normal. No respiratory distress.      Breath sounds: Normal breath sounds.   Chest:      Chest wall: Tenderness present.       Abdominal:      General: Bowel sounds are normal. There is no distension.      Palpations: Abdomen is soft.      Tenderness: There is no abdominal tenderness.   Musculoskeletal:         General: Normal range of motion.      Cervical back: Normal range of motion and neck supple.   Skin:     General: Skin is warm and dry.   Neurological:      Mental Status: She is alert and oriented to person, place, and time.   Psychiatric:         Mood and Affect: Mood and affect normal.           LAB RESULTS  Lab Results (last 24 hours)     Procedure Component Value Units Date/Time    CBC & Differential [[id number]]  (Abnormal) Collected: 10/03/21 1436    Specimen: Blood Updated: 10/03/21 1520    Narrative:      The following orders were created for panel order CBC & Differential.  Procedure                               Abnormality         Status                     ---------                               -----------         ------                     CBC Auto Differential[[id number]]        Abnormal             Final result                 Please view results for these tests on the individual orders.    Comprehensive Metabolic Panel [[id number]]  (Abnormal) Collected: 10/03/21 1436    Specimen: Blood Updated: 10/03/21 1520     Glucose 73 mg/dL      BUN 3 mg/dL      Creatinine 0.78 mg/dL      Sodium 137 mmol/L      Potassium 3.5 mmol/L      Comment: Slight hemolysis detected by analyzer. Results may be affected.        Chloride 104 mmol/L      CO2 24.9 mmol/L      Calcium 8.9 mg/dL      Total Protein 6.3 g/dL      Albumin 3.80 g/dL      ALT (SGPT) 11 U/L      AST (SGOT) 26 U/L      Comment: Slight hemolysis detected by analyzer. Results may be affected.        Alkaline Phosphatase 73 U/L      Total Bilirubin 0.2 mg/dL      eGFR Non African Amer 92 mL/min/1.73      Globulin 2.5 gm/dL      A/G Ratio 1.5 g/dL      BUN/Creatinine Ratio 3.8     Anion Gap 8.1 mmol/L     Narrative:      GFR Normal >60  Chronic Kidney Disease <60  Kidney Failure <15      Troponin [[id number]]  (Normal) Collected: 10/03/21 1436    Specimen: Blood Updated: 10/03/21 1507     Troponin T <0.010 ng/mL     Narrative:      Troponin T Reference Range:  <= 0.03 ng/mL-   Negative for AMI  >0.03 ng/mL-     Abnormal for myocardial necrosis.  Clinicians would have to utilize clinical acumen, EKG, Troponin and serial changes to determine if it is an Acute Myocardial Infarction or myocardial injury due to an underlying chronic condition.       Results may be falsely decreased if patient taking Biotin.      CBC Auto Differential [[id number]]  (Abnormal) Collected: 10/03/21 1436    Specimen: Blood Updated: 10/03/21 1520     WBC 11.35 10*3/mm3      RBC 3.59 10*6/mm3      Hemoglobin 11.7 g/dL      Hematocrit 35.0 %      MCV 97.5 fL      MCH 32.6 pg      MCHC 33.4 g/dL      RDW 12.7 %      RDW-SD 45.8 fl      MPV 11.0 fL      Platelets 203 10*3/mm3      Neutrophil % 72.6 %      Lymphocyte % 10.7 %      Monocyte % 14.3 %      Eosinophil % 1.1 %      Basophil % 0.2 %       Immature Grans % 1.1 %      Neutrophils, Absolute 8.23 10*3/mm3      Lymphocytes, Absolute 1.22 10*3/mm3      Monocytes, Absolute 1.62 10*3/mm3      Eosinophils, Absolute 0.13 10*3/mm3      Basophils, Absolute 0.02 10*3/mm3      Immature Grans, Absolute 0.13 10*3/mm3             I ordered the above labs and reviewed the results    RADIOLOGY  XR Chest 1 View    Result Date: 10/3/2021  CR Chest 1 Vw INDICATION: Chest pain starting last night. Patient is pregnant to 1/21/2022. Consent obtained with shielding performed COMPARISON:  Chest x-ray 10/28/2014. FINDINGS: Portable AP view(s) of the chest.  Cardiac silhouette size is normal. There is no pleural effusion or pneumothorax. Left costophrenic angle is obscured by the shielding. There is prominence of the central vascular markings, expected in the pregnant patient. No acute infiltrate or acute congestive heart failure is suspected.     No active disease. Signer Name: Nancy Willis MD  Signed: 10/3/2021 3:14 PM  Workstation Name: [email or web address]  [facility]      I ordered the above radiologic testing and reviewed the results    PROCEDURES  Procedures      PROGRESS AND CONSULTS  ED Course as of Oct 03 1550   Sun Oct 03, 2021   1523 EKG         EKG time / Interpretation time: 1433/1434  Rhythm/Rate: Sinus rhythm/89   NM: 127  QRS, axis: 47, normal axis  QTc 418  ST and T waves: Significant ST elevation or depression, no T wave abnormality  EKG Tracing Interpreted Contemporaneously by unchanged compared to prior 4/18/2018      [GT]      ED Course User Index  [GT] Linda Chavis PA-C           MEDICAL DECISION MAKING    MDM      PERC Rule (for pulmonary embolism) reviewed and/or performed as part of the patient evaluation and treatment planning process.  The result associated with this review/performance is: 0    My differential diagnosis for chest pain includes but is not limited to:  Muscle strain, costochondritis, myositis, pleurisy, rib  fracture, intercostal neuritis, herpes zoster, tumor, myocardial infarction, coronary syndrome, unstable angina, angina, aortic dissection, mitral valve prolapse, pericarditis, palpitations, pulmonary embolus, pneumonia, pneumothorax, lung cancer, GERD, esophagitis, esophageal spasm    DIAGNOSIS  Final diagnoses:   Chest pain, unspecified type       Latest Documented Vital Signs:  As of 15:50 EDT  BP- 129/91 HR- 90 Temp- 98.7 °F (37.1 °C) (Oral) O2 sat- 98%    DISPOSITION  Discharged home        Discussed pertinent findings with the patient/family.  Patient/Family voiced understanding of need to follow-up for recheck and further testing as needed.  Return to the Emergency Department warnings were given.         Medication List      No changes were made to your prescriptions during this visit.             Follow-up Information     Nimco Tate PA-C. Call in 1 day.    Specialty: Family Medicine  Why: To schedule a follow up appointment  Contact information:  [address] [facility] [address]  [phone]                     Dictated utilizing Dragon dictation     Linda Chavis PA-C  10/03/21 [id number]

## 2021-10-03 NOTE — TELEPHONE ENCOUNTER
"  Caller  has started with SOA since last night with chest pain upon deep inspiration.  is 24 weeks pregnant-EDC 01/21/22.  has also had loss of appetite for past 3 days, slight sore throat, low backache.  works in hospital. Instructed per Care Advice- will have family member take her to [facility] for evaluation. Notified Nico at [facility] of patient's pending arrival.  Reason for Disposition  • MILD difficulty breathing (e.g., minimal/no SOB at rest, SOB with walking, pulse <100)    Additional Information  • Negative: SEVERE difficulty breathing (e.g., struggling for each breath, speaks in single words)  • Negative: Difficult to awaken or acting confused (e.g., disoriented, slurred speech)  • Negative: Bluish (or gray) lips or face now  • Negative: Shock suspected (e.g., cold/pale/clammy skin, too weak to stand, low BP, rapid pulse)  • Negative: Sounds like a life-threatening emergency to the triager  • Negative: [1] COVID-19 exposure AND [2] no symptoms  • Negative: COVID-19 vaccine reaction suspected (e.g., fever, headache, muscle aches) occurring 1 to 3 days after getting vaccine  • Negative: COVID-19 vaccine, questions about  • Negative: [1] Lives with someone known to have influenza (flu test positive) AND [2] flu-like symptoms (e.g., cough, runny nose, sore throat, SOB; with or without fever)  • Negative: [1] Adult with possible COVID-19 symptoms AND [2] triager concerned about severity of symptoms or other causes  • Negative: COVID-19 and breastfeeding, questions about  • Negative: SEVERE or constant chest pain or pressure (Exception: mild central chest pain, present only when coughing)  • Negative: MODERATE difficulty breathing (e.g., speaks in phrases, SOB even at rest, pulse 100-120)  • Negative: [1] Headache AND [2] stiff neck (can't touch chin to chest)    Answer Assessment - Initial Assessment Questions  1. COVID-19 DIAGNOSIS: \"Who made your Coronavirus " "(COVID-19) diagnosis?\" \"Was it confirmed by a positive lab test?\" If not diagnosed by a HCP, ask \"Are there lots of cases (community spread) where you live?\" (See public health department website, if unsure)      Has not been tested-having some SOA.  2. COVID-19 EXPOSURE: \"Was there any known exposure to COVID before the symptoms began?\" CDC Definition of close contact: within 6 feet (2 meters) for a total of 15 minutes or more over a 24-hour period.       Unsure-works in a hospital.  3. ONSET: \"When did the COVID-19 symptoms start?\"       SOA started last night.  4. WORST SYMPTOM: \"What is your worst symptom?\" (e.g., cough, fever, shortness of breath, muscle aches)      SOA-pain upon deep inspiration.  5. COUGH: \"Do you have a cough?\" If Yes, ask: \"How bad is the cough?\"        No cough.  6. FEVER: \"Do you have a fever?\" If Yes, ask: \"What is your temperature, how was it measured, and when did it start?\"      No fever.  7. RESPIRATORY STATUS: \"Describe your breathing?\" (e.g., shortness of breath, wheezing, unable to speak)       SOA during conversation.  8. BETTER-SAME-WORSE: \"Are you getting better, staying the same or getting worse compared to yesterday?\"  If getting worse, ask, \"In what way?\"      worse  9. HIGH RISK DISEASE: \"Do you have any chronic medical problems?\" (e.g., asthma, heart or lung disease, weak immune system, obesity, etc.)      24 weeks pregnant  10. PREGNANCY: \"Is there any chance you are pregnant?\" \"When was your last menstrual period?\"        24 weeks pregnant-[facility] 01/21/22  11. OTHER SYMPTOMS: \"Do you have any other symptoms?\"  (e.g., chills, fatigue, headache, loss of smell or taste, muscle pain, sore throat; new loss of smell or taste especially support the diagnosis of COVID-19)        Muscle pain in lower back, sore throat, SOA at rest, chest pain on deep breathing, loss of appetite.    Protocols used: CORONAVIRUS (COVID-19) DIAGNOSED OR SUSPECTED-ADULT-AH      "

## 2021-10-07 ENCOUNTER — ROUTINE PRENATAL (OUTPATIENT)
Dept: OBSTETRICS AND GYNECOLOGY | Facility: CLINIC | Age: 21
End: 2021-10-07

## 2021-10-07 VITALS — DIASTOLIC BLOOD PRESSURE: 64 MMHG | WEIGHT: 137.2 LBS | SYSTOLIC BLOOD PRESSURE: 108 MMHG | BODY MASS INDEX: 24.3 KG/M2

## 2021-10-07 DIAGNOSIS — R10.11 RIGHT UPPER QUADRANT PAIN: ICD-10-CM

## 2021-10-07 DIAGNOSIS — R55 POSTURAL DIZZINESS WITH PRESYNCOPE: ICD-10-CM

## 2021-10-07 DIAGNOSIS — R42 POSTURAL DIZZINESS WITH PRESYNCOPE: ICD-10-CM

## 2021-10-07 DIAGNOSIS — R07.9 CHEST PAIN, UNSPECIFIED TYPE: ICD-10-CM

## 2021-10-07 DIAGNOSIS — Z3A.24 24 WEEKS GESTATION OF PREGNANCY: Primary | ICD-10-CM

## 2021-10-07 DIAGNOSIS — R06.02 SHORTNESS OF BREATH: ICD-10-CM

## 2021-10-07 LAB
EXTERNAL NIPT: NORMAL
GLUCOSE UR STRIP-MCNC: NEGATIVE MG/DL
PROT UR STRIP-MCNC: NEGATIVE MG/DL

## 2021-10-07 PROCEDURE — 99214 OFFICE O/P EST MOD 30 MIN: CPT | Performed by: NURSE PRACTITIONER
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
## 2021-10-08 LAB
ALBUMIN SERPL-MCNC: 3.9 G/DL (ref 3.5–5.2)
ALBUMIN/GLOB SERPL: 2.1 G/DL
ALP SERPL-CCNC: 61 U/L (ref 39–117)
ALT SERPL-CCNC: 8 U/L (ref 1–33)
AMYLASE SERPL-CCNC: 72 U/L (ref 28–100)
AST SERPL-CCNC: 16 U/L (ref 1–32)
BILIRUB SERPL-MCNC: 0.2 MG/DL (ref 0–1.2)
BUN SERPL-MCNC: 9 MG/DL (ref 6–20)
BUN/CREAT SERPL: 20.9 (ref 7–25)
CALCIUM SERPL-MCNC: 9.4 MG/DL (ref 8.6–10.5)
CHLORIDE SERPL-SCNC: 104 MMOL/L (ref 98–107)
CO2 SERPL-SCNC: 22.4 MMOL/L (ref 22–29)
CREAT SERPL-MCNC: 0.43 MG/DL (ref 0.57–1)
ERYTHROCYTE [DISTWIDTH] IN BLOOD BY AUTOMATED COUNT: 12.1 % (ref 12.3–15.4)
FT4I SERPL CALC-MCNC: 1 (ref 1.2–4.9)
GLOBULIN SER CALC-MCNC: 1.9 GM/DL
GLUCOSE SERPL-MCNC: 73 MG/DL (ref 65–99)
HCT VFR BLD AUTO: 34.3 % (ref 34–46.6)
HGB BLD-MCNC: 11.6 G/DL (ref 12–15.9)
LIPASE SERPL-CCNC: 38 U/L (ref 13–60)
MCH RBC QN AUTO: 32.8 PG (ref 26.6–33)
MCHC RBC AUTO-ENTMCNC: 33.8 G/DL (ref 31.5–35.7)
MCV RBC AUTO: 96.9 FL (ref 79–97)
PLATELET # BLD AUTO: 228 10*3/MM3 (ref 140–450)
POTASSIUM SERPL-SCNC: 4.2 MMOL/L (ref 3.5–5.2)
PROT SERPL-MCNC: 5.8 G/DL (ref 6–8.5)
RBC # BLD AUTO: 3.54 10*6/MM3 (ref 3.77–5.28)
SODIUM SERPL-SCNC: 139 MMOL/L (ref 136–145)
T3RU NFR SERPL: 14 % (ref 24–39)
T4 SERPL-MCNC: 6.93 MCG/DL (ref 4.5–11.7)
TSH SERPL DL<=0.005 MIU/L-ACNC: 0.55 UIU/ML (ref 0.27–4.2)
WBC # BLD AUTO: 10.35 10*3/MM3 (ref 3.4–10.8)

## 2021-10-15 ENCOUNTER — HOSPITAL ENCOUNTER (OUTPATIENT)
Dept: ULTRASOUND IMAGING | Facility: HOSPITAL | Age: 21
Discharge: HOME | End: 2021-10-15
Admitting: NURSE PRACTITIONER

## 2021-10-15 DIAGNOSIS — R10.11 RIGHT UPPER QUADRANT PAIN: ICD-10-CM

## 2021-10-15 PROCEDURE — 76705 ECHO EXAM OF ABDOMEN: CPT

## 2021-10-18 ENCOUNTER — ROUTINE PRENATAL (OUTPATIENT)
Dept: OBSTETRICS AND GYNECOLOGY | Facility: CLINIC | Age: 21
End: 2021-10-18

## 2021-10-18 VITALS — BODY MASS INDEX: 24.98 KG/M2 | WEIGHT: 141 LBS | SYSTOLIC BLOOD PRESSURE: 110 MMHG | DIASTOLIC BLOOD PRESSURE: 70 MMHG

## 2021-10-18 DIAGNOSIS — O26.849 FETAL SIZE INCONSISTENT WITH DATES: ICD-10-CM

## 2021-10-18 DIAGNOSIS — Z34.92 PRENATAL CARE IN SECOND TRIMESTER: Primary | ICD-10-CM

## 2021-10-18 DIAGNOSIS — R06.02 SHORTNESS OF BREATH: ICD-10-CM

## 2021-10-18 LAB
GLUCOSE UR STRIP-MCNC: NEGATIVE MG/DL
PROT UR STRIP-MCNC: NEGATIVE MG/DL

## 2021-10-18 PROCEDURE — 99213 OFFICE O/P EST LOW 20 MIN: CPT | Performed by: NURSE PRACTITIONER

## 2021-10-18 RX ORDER — DOXYCYCLINE HYCLATE 50 MG/1
324 CAPSULE, GELATIN COATED ORAL
Qty: 30 TABLET | Refills: 2 | Status: SHIPPED | OUTPATIENT
Start: 2021-10-18 | End: 2021-12-31 | Stop reason: HOSPADM
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
## 2021-10-28 ENCOUNTER — OFFICE VISIT (OUTPATIENT)
Dept: CARDIOLOGY | Facility: CLINIC | Age: 21
End: 2021-10-28

## 2021-10-28 VITALS
SYSTOLIC BLOOD PRESSURE: 110 MMHG | HEART RATE: 92 BPM | RESPIRATION RATE: 16 BRPM | BODY MASS INDEX: 25.34 KG/M2 | WEIGHT: 143 LBS | OXYGEN SATURATION: 98 % | DIASTOLIC BLOOD PRESSURE: 60 MMHG | HEIGHT: 63 IN

## 2021-10-28 DIAGNOSIS — R01.1 HEART MURMUR: Primary | ICD-10-CM

## 2021-10-28 DIAGNOSIS — R07.89 CHEST PAIN, ATYPICAL: ICD-10-CM

## 2021-10-28 PROCEDURE — 99204 OFFICE O/P NEW MOD 45 MIN: CPT | Performed by: INTERNAL MEDICINE

## 2021-10-28 PROCEDURE — 93000 ELECTROCARDIOGRAM COMPLETE: CPT | Performed by: INTERNAL MEDICINE

## 2021-10-28 NOTE — PROGRESS NOTES
PATIENTINFORMATION    Date of Office Visit: 10/28/2021  Encounter Provider: Alexander Doll MD  Place of Service: [facility] CARDIOLOGY  Patient Name: Ana Enriquez  : 2000    Subjective:     Encounter Date:10/28/2021      Patient ID: Ana Enriquez is a 20 y.o. female.    No chief complaint on file.    HPI  Ms. Enriquez is a 20 years old prima  with 26 weeks pregnant referred to cardiology clinic for evaluation of chest pain.  She had to go to the ER on 10/3/2021 with complaints of retrosternal chest pains that she describes as pressure-like lasted for several hours.  Pain started the night before and was intermittent and of 7 out of 10 severity with no significant radiation.  She feels shortness of breath during pain episodes.  ER evaluation is negative for ACS.  Pain finally improved after she was given aspirin.  She continues to have intermittent episodes milder episodes that have overall improved.  No known exacerbating or relieving factor and could come both during daytime, nighttime in both during activities and rest.  She denies any relationship of pain to food.  She reports she had a hole in her heart when she was a kid that spontaneously closed.  No family history of premature coronary artery disease.  She denies any current tobacco, alcohol or recreational drug use.    ROS   All systems reviewed and negative except as noted in HPI.    Past Medical History:   Diagnosis Date   • Cannabis abuse    • Self-mutilation     cutting       Past Surgical History:   Procedure Laterality Date   • WISDOM TOOTH EXTRACTION         Social History     Socioeconomic History   • Marital status: Single   Tobacco Use   • Smoking status: Former Smoker     Years: 5.00     Types: Electronic Cigarette   • Smokeless tobacco: Never Used   • Tobacco comment: DENIED   Substance and Sexual Activity   • Alcohol use: No     Comment: DENIED   • Drug use: Yes     Types: Marijuana     Comment: last  "used pot 4 weeks ago   • Sexual activity: Yes     Partners: Male       Family History   Problem Relation Age of Onset   • Nephrolithiasis Mother    • Other Maternal Grandmother         fibrocystic disease of breast   • Nephrolithiasis Maternal Grandmother    • Heart disease Maternal Grandmother    • Leukemia Paternal Grandmother    • Heart disease Paternal Grandmother    • Lymphoma Paternal Grandmother    • No Known Problems Sister    • Cancer Paternal Grandfather         skin           ECG 12 Lead    Date/Time: 10/28/2021 1:16 PM  Performed by: Alexander Doll MD  Authorized by: Alexander Doll MD   Comparison: compared with previous ECG from 10/3/2021  Similar to previous ECG  Rhythm: sinus rhythm  Rate: normal  Conduction: conduction normal  ST Segments: ST segments normal  T Waves: T waves normal  QRS axis: normal  Other: no other findings    Clinical impression: normal ECG               Objective:     /60   Pulse 92   Resp 16   Ht 160 cm (63\")   Wt 64.9 kg (143 lb)   LMP 04/16/2021   SpO2 98%   BMI 25.33 kg/m²  Body mass index is 25.33 kg/m².     Constitutional:       General: Not in acute distress.     Appearance: Well-developed. Not diaphoretic.   Eyes:      Pupils: Pupils are equal, round, and reactive to light.   HENT:      Head: Normocephalic and atraumatic.   Neck:      Thyroid: No thyromegaly.   Pulmonary:      Effort: Pulmonary effort is normal. No respiratory distress.      Breath sounds: Normal breath sounds. No wheezing. No rales.   Chest:      Chest wall: Not tender to palpatation.   Cardiovascular:      Normal rate. Regular rhythm.      Murmurs: There is a blowing early systolic murmur at the URSB.      No gallop.   Pulses:     Intact distal pulses.   Edema:     Peripheral edema absent.   Abdominal:      General: Bowel sounds are normal. There is no distension.      Palpations: Abdomen is soft.      Tenderness: There is no guarding.   Musculoskeletal: Normal range of " motion.         General: No deformity.      Cervical back: Normal range of motion and neck supple. Skin:     General: Skin is warm and dry.      Findings: No rash.   Neurological:      Mental Status: Alert and oriented to person, place, and time.      Cranial Nerves: No cranial nerve deficit.      Deep Tendon Reflexes: Reflexes are normal and symmetric.   Psychiatric:         Judgment: Judgment normal.         Review Of Data:       Assessment/Plan:         1.  Systolic murmur in left second intercostal and left lower sternal border-   2.  Atypical chest pain  3.  Primigravid who is 26 weeks pregnant    I do not think the chest pain is of cardiac origin.  She reports prior history of hole in her heart and has systolic murmur in the left side intercostal space.  She will get echocardiogram otherwise no further testing or treatment.  Agree with symptomatic treatment of pain with Tylenol and may try acid reflux treatment with persistent pain.    Diagnosis and plan of care discussed with patient and verbalized understanding.           Alexander Doll MD  10/28/21  13:51 EDT

## 2021-11-01 ENCOUNTER — ROUTINE PRENATAL (OUTPATIENT)
Dept: OBSTETRICS AND GYNECOLOGY | Facility: CLINIC | Age: 21
End: 2021-11-01

## 2021-11-01 VITALS — WEIGHT: 148 LBS | BODY MASS INDEX: 26.22 KG/M2 | SYSTOLIC BLOOD PRESSURE: 110 MMHG | DIASTOLIC BLOOD PRESSURE: 64 MMHG

## 2021-11-01 DIAGNOSIS — Z34.93 PRENATAL CARE IN THIRD TRIMESTER: Primary | ICD-10-CM

## 2021-11-01 DIAGNOSIS — Z36.9 ENCOUNTER FOR ANTENATAL SCREENING, UNSPECIFIED: ICD-10-CM

## 2021-11-01 DIAGNOSIS — O36.5939 SGA (SMALL FOR GESTATIONAL AGE), FETAL, AFFECTING CARE OF MOTHER, ANTEPARTUM, THIRD TRIMESTER, OTHER FETUS: ICD-10-CM

## 2021-11-01 DIAGNOSIS — R94.6 ABNORMAL FINDING ON THYROID FUNCTION TEST: ICD-10-CM

## 2021-11-01 DIAGNOSIS — R82.5 POSITIVE URINE DRUG SCREEN: ICD-10-CM

## 2021-11-01 LAB
GLUCOSE UR STRIP-MCNC: NEGATIVE MG/DL
PROT UR STRIP-MCNC: NEGATIVE MG/DL

## 2021-11-01 PROCEDURE — 99214 OFFICE O/P EST MOD 30 MIN: CPT | Performed by: NURSE PRACTITIONER

## 2021-11-01 RX ORDER — FAMOTIDINE 20 MG/1
20 TABLET, FILM COATED ORAL 2 TIMES DAILY
Qty: 60 TABLET | Refills: 1 | Status: SHIPPED | OUTPATIENT
Start: 2021-11-01 | End: 2022-05-30
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
## 2021-11-02 ENCOUNTER — HOSPITAL ENCOUNTER (OUTPATIENT)
Dept: CARDIOLOGY | Facility: HOSPITAL | Age: 21
Discharge: HOME | End: 2021-11-02
Admitting: INTERNAL MEDICINE

## 2021-11-02 VITALS
HEIGHT: 63 IN | BODY MASS INDEX: 26.17 KG/M2 | HEART RATE: 87 BPM | DIASTOLIC BLOOD PRESSURE: 68 MMHG | WEIGHT: 147.71 LBS | SYSTOLIC BLOOD PRESSURE: 125 MMHG

## 2021-11-02 DIAGNOSIS — R01.1 HEART MURMUR: ICD-10-CM

## 2021-11-02 LAB
BH CV ECHO MEAS - AO MAX PG (FULL): 1.8 MMHG
BH CV ECHO MEAS - AO MAX PG: 11.4 MMHG
BH CV ECHO MEAS - AO MEAN PG (FULL): 0 MMHG
BH CV ECHO MEAS - AO MEAN PG: 5 MMHG
BH CV ECHO MEAS - AO ROOT AREA (BSA CORRECTED): 1.5
BH CV ECHO MEAS - AO ROOT AREA: 4.9 CM^2
BH CV ECHO MEAS - AO ROOT DIAM: 2.5 CM
BH CV ECHO MEAS - AO V2 MAX: 169 CM/SEC
BH CV ECHO MEAS - AO V2 MEAN: 103 CM/SEC
BH CV ECHO MEAS - AO V2 VTI: 28.3 CM
BH CV ECHO MEAS - ASC AORTA: 2.7 CM
BH CV ECHO MEAS - AVA(I,A): 2.8 CM^2
BH CV ECHO MEAS - AVA(I,D): 2.8 CM^2
BH CV ECHO MEAS - AVA(V,A): 2.6 CM^2
BH CV ECHO MEAS - AVA(V,D): 2.6 CM^2
BH CV ECHO MEAS - BSA(HAYCOCK): 1.7 M^2
BH CV ECHO MEAS - BSA: 1.7 M^2
BH CV ECHO MEAS - BZI_BMI: 26.2 KILOGRAMS/M^2
BH CV ECHO MEAS - BZI_METRIC_HEIGHT: 160 CM
BH CV ECHO MEAS - BZI_METRIC_WEIGHT: 67 KG
BH CV ECHO MEAS - EDV(CUBED): 114.1 ML
BH CV ECHO MEAS - EDV(MOD-SP2): 87.7 ML
BH CV ECHO MEAS - EDV(MOD-SP4): 96.8 ML
BH CV ECHO MEAS - EDV(TEICH): 110.2 ML
BH CV ECHO MEAS - EF(CUBED): 63.7 %
BH CV ECHO MEAS - EF(MOD-BP): 64.9 %
BH CV ECHO MEAS - EF(MOD-SP2): 61.5 %
BH CV ECHO MEAS - EF(MOD-SP4): 68.3 %
BH CV ECHO MEAS - EF(TEICH): 55.1 %
BH CV ECHO MEAS - ESV(CUBED): 41.4 ML
BH CV ECHO MEAS - ESV(MOD-SP2): 33.8 ML
BH CV ECHO MEAS - ESV(MOD-SP4): 30.7 ML
BH CV ECHO MEAS - ESV(TEICH): 49.5 ML
BH CV ECHO MEAS - FS: 28.7 %
BH CV ECHO MEAS - IVS/LVPW: 0.99
BH CV ECHO MEAS - IVSD: 0.83 CM
BH CV ECHO MEAS - LAT PEAK E' VEL: 17.6 CM/SEC
BH CV ECHO MEAS - LV DIASTOLIC VOL/BSA (35-75): 56.9 ML/M^2
BH CV ECHO MEAS - LV MASS(C)D: 135.8 GRAMS
BH CV ECHO MEAS - LV MASS(C)DI: 79.9 GRAMS/M^2
BH CV ECHO MEAS - LV MAX PG: 9.6 MMHG
BH CV ECHO MEAS - LV MEAN PG: 5 MMHG
BH CV ECHO MEAS - LV SYSTOLIC VOL/BSA (12-30): 18.1 ML/M^2
BH CV ECHO MEAS - LV V1 MAX: 155 CM/SEC
BH CV ECHO MEAS - LV V1 MEAN: 99.3 CM/SEC
BH CV ECHO MEAS - LV V1 VTI: 28.4 CM
BH CV ECHO MEAS - LVIDD: 4.9 CM
BH CV ECHO MEAS - LVIDS: 3.5 CM
BH CV ECHO MEAS - LVLD AP2: 8.5 CM
BH CV ECHO MEAS - LVLD AP4: 8.3 CM
BH CV ECHO MEAS - LVLS AP2: 6.4 CM
BH CV ECHO MEAS - LVLS AP4: 6.3 CM
BH CV ECHO MEAS - LVOT AREA (M): 2.8 CM^2
BH CV ECHO MEAS - LVOT AREA: 2.8 CM^2
BH CV ECHO MEAS - LVOT DIAM: 1.9 CM
BH CV ECHO MEAS - LVPWD: 0.84 CM
BH CV ECHO MEAS - MED PEAK E' VEL: 8.5 CM/SEC
BH CV ECHO MEAS - MV A MAX VEL: 73.7 CM/SEC
BH CV ECHO MEAS - MV DEC SLOPE: 661 CM/SEC^2
BH CV ECHO MEAS - MV DEC TIME: 177 SEC
BH CV ECHO MEAS - MV E MAX VEL: 82.5 CM/SEC
BH CV ECHO MEAS - MV E/A: 1.1
BH CV ECHO MEAS - MV MEAN PG: 2 MMHG
BH CV ECHO MEAS - MV P1/2T MAX VEL: 142 CM/SEC
BH CV ECHO MEAS - MV P1/2T: 62.9 MSEC
BH CV ECHO MEAS - MV V2 MEAN: 69.9 CM/SEC
BH CV ECHO MEAS - MV V2 VTI: 29.5 CM
BH CV ECHO MEAS - MVA P1/2T LCG: 1.5 CM^2
BH CV ECHO MEAS - MVA(P1/2T): 3.5 CM^2
BH CV ECHO MEAS - MVA(VTI): 2.7 CM^2
BH CV ECHO MEAS - PA ACC TIME: 0.12 SEC
BH CV ECHO MEAS - PA MAX PG: 4.7 MMHG
BH CV ECHO MEAS - PA PR(ACCEL): 23.2 MMHG
BH CV ECHO MEAS - PA V2 MAX: 108 CM/SEC
BH CV ECHO MEAS - QP/QS: 0.86
BH CV ECHO MEAS - RV MEAN PG: 2 MMHG
BH CV ECHO MEAS - RV V1 MEAN: 55.4 CM/SEC
BH CV ECHO MEAS - RV V1 VTI: 18.3 CM
BH CV ECHO MEAS - RVOT AREA: 3.8 CM^2
BH CV ECHO MEAS - RVOT DIAM: 2.2 CM
BH CV ECHO MEAS - SI(AO): 81.7 ML/M^2
BH CV ECHO MEAS - SI(CUBED): 42.7 ML/M^2
BH CV ECHO MEAS - SI(LVOT): 47.4 ML/M^2
BH CV ECHO MEAS - SI(MOD-SP2): 31.7 ML/M^2
BH CV ECHO MEAS - SI(MOD-SP4): 38.9 ML/M^2
BH CV ECHO MEAS - SI(TEICH): 35.7 ML/M^2
BH CV ECHO MEAS - SV(AO): 138.9 ML
BH CV ECHO MEAS - SV(CUBED): 72.7 ML
BH CV ECHO MEAS - SV(LVOT): 80.5 ML
BH CV ECHO MEAS - SV(MOD-SP2): 53.9 ML
BH CV ECHO MEAS - SV(MOD-SP4): 66.1 ML
BH CV ECHO MEAS - SV(RVOT): 69.6 ML
BH CV ECHO MEAS - SV(TEICH): 60.7 ML
BH CV ECHO MEAS - TAPSE (>1.6): 3.4 CM
BH CV ECHO MEASUREMENTS AVERAGE E/E' RATIO: 6.32
BH CV XLRA - RV BASE: 3 CM
BH CV XLRA - RV LENGTH: 7.6 CM
BH CV XLRA - TDI S': 19.5 CM/SEC
GLUCOSE 1H P 75 G GLC PO SERPL-MCNC: 120 MG/DL (ref 65–179)
GLUCOSE 2H P 75 G GLC PO SERPL-MCNC: 108 MG/DL (ref 65–152)
GLUCOSE P FAST SERPL-MCNC: 79 MG/DL (ref 65–91)
HCT VFR BLD AUTO: 33.4 % (ref 34–46.6)
HGB BLD-MCNC: 11.3 G/DL (ref 11.1–15.9)
LEFT ATRIUM VOLUME INDEX: 24.8 ML/M2
LV EF 2D ECHO EST: 65 %
MAXIMAL PREDICTED HEART RATE: 198 BPM
STRESS TARGET HR: 168 BPM
T3 SERPL-MCNC: 167 NG/DL (ref 71–180)
T4 FREE SERPL-MCNC: 0.69 NG/DL (ref 0.82–1.77)
TSH SERPL DL<=0.005 MIU/L-ACNC: 0.8 UIU/ML (ref 0.45–4.5)

## 2021-11-02 PROCEDURE — 93306 TTE W/DOPPLER COMPLETE: CPT

## 2021-11-02 PROCEDURE — 93306 TTE W/DOPPLER COMPLETE: CPT | Performed by: INTERNAL MEDICINE

## 2021-11-08 ENCOUNTER — HOSPITAL ENCOUNTER (OUTPATIENT)
Facility: HOSPITAL | Age: 21
Discharge: HOME | End: 2021-11-08
Attending: EMERGENCY MEDICINE | Admitting: OBSTETRICS & GYNECOLOGY

## 2021-11-08 ENCOUNTER — TELEPHONE (OUTPATIENT)
Dept: OBSTETRICS AND GYNECOLOGY | Facility: CLINIC | Age: 21
End: 2021-11-08

## 2021-11-08 ENCOUNTER — HOSPITAL ENCOUNTER (OUTPATIENT)
Facility: HOSPITAL | Age: 21
End: 2021-11-08
Attending: OBSTETRICS & GYNECOLOGY | Admitting: OBSTETRICS & GYNECOLOGY

## 2021-11-08 VITALS
HEART RATE: 77 BPM | BODY MASS INDEX: 26.22 KG/M2 | OXYGEN SATURATION: 100 % | TEMPERATURE: 97.4 F | DIASTOLIC BLOOD PRESSURE: 63 MMHG | RESPIRATION RATE: 17 BRPM | WEIGHT: 148 LBS | SYSTOLIC BLOOD PRESSURE: 106 MMHG | HEIGHT: 63 IN

## 2021-11-08 DIAGNOSIS — R55 PRE-SYNCOPE: Primary | ICD-10-CM

## 2021-11-08 LAB
AMPHET+METHAMPHET UR QL: NEGATIVE
AMPHETAMINES UR QL: NEGATIVE
BACTERIA UR QL AUTO: ABNORMAL /HPF
BARBITURATES UR QL SCN: NEGATIVE
BENZODIAZ UR QL SCN: NEGATIVE
BILIRUB UR QL STRIP: NEGATIVE
BUPRENORPHINE SERPL-MCNC: NEGATIVE NG/ML
CANNABINOIDS SERPL QL: NEGATIVE
CLARITY UR: CLEAR
COCAINE UR QL: NEGATIVE
COLOR UR: YELLOW
GLUCOSE UR STRIP-MCNC: NEGATIVE MG/DL
HGB UR QL STRIP.AUTO: ABNORMAL
HYALINE CASTS UR QL AUTO: ABNORMAL /LPF
KETONES UR QL STRIP: NEGATIVE
LEUKOCYTE ESTERASE UR QL STRIP.AUTO: NEGATIVE
METHADONE UR QL SCN: NEGATIVE
NITRITE UR QL STRIP: NEGATIVE
OPIATES UR QL: NEGATIVE
OXYCODONE UR QL SCN: NEGATIVE
PCP UR QL SCN: NEGATIVE
PH UR STRIP.AUTO: 7 [PH] (ref 4.5–8)
PROPOXYPH UR QL: NEGATIVE
PROT UR QL STRIP: NEGATIVE
RBC # UR: ABNORMAL /HPF
REF LAB TEST METHOD: ABNORMAL
SP GR UR STRIP: 1.02 (ref 1–1.03)
SQUAMOUS #/AREA URNS HPF: ABNORMAL /HPF
TRICYCLICS UR QL SCN: NEGATIVE
UROBILINOGEN UR QL STRIP: ABNORMAL
WBC UR QL AUTO: ABNORMAL /HPF

## 2021-11-08 PROCEDURE — 99283 EMERGENCY DEPT VISIT LOW MDM: CPT | Performed by: EMERGENCY MEDICINE

## 2021-11-08 PROCEDURE — 99283 EMERGENCY DEPT VISIT LOW MDM: CPT

## 2021-11-08 PROCEDURE — 80306 DRUG TEST PRSMV INSTRMNT: CPT | Performed by: OBSTETRICS & GYNECOLOGY

## 2021-11-08 PROCEDURE — G0463 HOSPITAL OUTPT CLINIC VISIT: HCPCS

## 2021-11-08 PROCEDURE — 59025 FETAL NON-STRESS TEST: CPT | Performed by: OBSTETRICS & GYNECOLOGY

## 2021-11-08 PROCEDURE — 59025 FETAL NON-STRESS TEST: CPT

## 2021-11-08 PROCEDURE — 81001 URINALYSIS AUTO W/SCOPE: CPT | Performed by: OBSTETRICS & GYNECOLOGY

## 2021-11-08 NOTE — NURSING NOTE
Reviewed all discharge and follow up instructions with pt.  She voices a good understanding of the same.  Ambulatory off unit, stable, undelivered.

## 2021-11-08 NOTE — ED PROVIDER NOTES
Subjective   History of Present Illness  20-year-old female complains of intermittent spots in her vision and sometimes feeling little bit lightheaded and some other times feeling a bit fuzzy in the brain.  She says that the small spots in the vision last a couple minutes at a time and that when she feels out of it fuzzy brain with spots she has to sit down and then will feel better.  Denies having any headaches recently.  No focal neurologic symptoms, no difficulty with speech, no episodes of syncope no episode of chest pain or dyspnea.  Review of Systems   Eyes:        As noted in HPI   Neurological: Positive for light-headedness. Negative for dizziness, syncope, speech difficulty and headaches.       Past Medical History:   Diagnosis Date   • Cannabis abuse    • Self-mutilation     cutting       Allergies   Allergen Reactions   • Penicillins Hives       Past Surgical History:   Procedure Laterality Date   • WISDOM TOOTH EXTRACTION         Family History   Problem Relation Age of Onset   • Nephrolithiasis Mother    • Other Maternal Grandmother         fibrocystic disease of breast   • Nephrolithiasis Maternal Grandmother    • Heart disease Maternal Grandmother    • Leukemia Paternal Grandmother    • Heart disease Paternal Grandmother    • Lymphoma Paternal Grandmother    • No Known Problems Sister    • Cancer Paternal Grandfather         skin       Social History     Socioeconomic History   • Marital status: Single   Tobacco Use   • Smoking status: Former Smoker     Years: 5.00     Types: Electronic Cigarette   • Smokeless tobacco: Never Used   • Tobacco comment: DENIED   Substance and Sexual Activity   • Alcohol use: No     Comment: DENIED   • Drug use: Yes     Types: Marijuana     Comment: last used pot 4 weeks ago   • Sexual activity: Yes     Partners: Male           Objective    ED Triage Vitals [11/08/21 1657]   Temp Heart Rate Resp BP SpO2   98.6 °F (37 °C) 83 17 122/74 100 %      Temp src Heart Rate Source  Patient Position BP Location FiO2 (%)   Oral Apical Sitting Left arm --       Physical Exam  INITIAL VITAL SIGNS: Reviewed by me.  Pulse ox normal  GENERAL: Alert and interactive. No acute distress.  NEUROLOGIC: Alert and oriented. Face is symmetric.  Cranial nerves II through XII are intact as tested.  Speech is normal. Moves all extremities equally. Motor and sensory distally intact.  No cerebellar signs    Procedures           ED Course  ED Course as of 11/08/21 1721   Mon Nov 08, 2021   1715  Patient with occasional lightheadedness/brain fuzziness and some associated visual spots.  She says when she sits and rests she feels better.  Never last more than a minute or 2.  Not associated with chest pain or dyspnea.  Sounds like he is having some presyncopal episodes.  Vital signs are normal so not particularly concerned for preeclampsia. Discussed with Dr. Peñaloza of OB/GYN and as not concerned for any strokelike symptoms we will send her down to [facility] for evaluation. [RO]      ED Course User Index  [RO] Joaquin Rivero MD                                           [facility]    Final diagnoses:   Pre-syncope       ED Disposition  ED Disposition     ED Disposition Condition Comment    Send to L&D  Admitting Physician: CAROLYNN PEÑALOZA [[id number]]   Unit: formerly [facility] [[id number]]            No follow-up provider specified.       Medication List      No changes were made to your prescriptions during this visit.          Joaquin Rivero MD  11/08/21 1721

## 2021-11-08 NOTE — NON STRESS TEST
Ana Enriquez, a  at 29w3d with an RADHA of 2022, by Last Menstrual Period, was seen at [facility] for a nonstress test.    Chief Complaint   Patient presents with   • Dizziness       Patient Active Problem List   Diagnosis   • Abnormal finding on thyroid function test   • Bipolar I disorder, single manic episode (HCC)   • New onset seizure (HCC)   • Panic disorder without agoraphobia   • Anxiety   • Headache   • High risk medication use   • Polyphagia   • Herpes labialis   • Self mutilating behavior   • h/o ectopic pregnancy   • Nausea and vomiting during pregnancy prior to 22 weeks gestation   • Positive urine drug screen:+THC. Needs =   • Pregnancy   • Shortness of breath   • Right upper quadrant pain   • Chest pain   • Dizziness   • Fetal size inconsistent with dates   • Prenatal care in second trimester   • Pre-syncope       Start Time:    Stop Time:     Interpretation A  Nonstress Test Interpretation A: Reactive (21 : Silvia Guillermo RN)

## 2021-11-08 NOTE — TELEPHONE ENCOUNTER
PT CALLED STATING SHE WAS SEEING BLACK SPOTS MULTIPLE TIMES TODAY AND JUST FELT FUNKY AND WEIRD.  ADVISED PT TO SO TO WOMENS TO BE CHECKED OUT AFTER SPEAKING WITH MARIA G.

## 2021-11-08 NOTE — NURSING NOTE
"I spoke with Dr De Leon, reported all pt bp's done on  L and d.  Pt denies deing spots at present, states \"a little airy headed\".    FHtones 150's, no contractions, no vaginal bleeding, pt denies pain.    Orders rec'd to discharge to home.  "

## 2021-11-11 LAB
AMPHETAMINES UR QL SCN: NEGATIVE NG/ML
BARBITURATES UR QL SCN: NEGATIVE NG/ML
BENZODIAZ UR QL SCN: NEGATIVE NG/ML
BZE UR QL SCN: NEGATIVE NG/ML
CANNABINOIDS UR QL SCN: NEGATIVE NG/ML
CREAT UR-MCNC: 68.5 MG/DL (ref 20–300)
LABORATORY COMMENT REPORT: NORMAL
METHADONE UR QL SCN: NEGATIVE NG/ML
OPIATES UR QL SCN: NEGATIVE NG/ML
OXYCODONE+OXYMORPHONE UR QL SCN: NEGATIVE NG/ML
PCP UR QL: NEGATIVE NG/ML
PH UR: 8.2 [PH] (ref 4.5–8.9)
PROPOXYPH UR QL SCN: NEGATIVE NG/ML

## 2021-11-15 ENCOUNTER — ROUTINE PRENATAL (OUTPATIENT)
Dept: OBSTETRICS AND GYNECOLOGY | Facility: CLINIC | Age: 21
End: 2021-11-15

## 2021-11-15 VITALS — SYSTOLIC BLOOD PRESSURE: 104 MMHG | BODY MASS INDEX: 26.93 KG/M2 | DIASTOLIC BLOOD PRESSURE: 68 MMHG | WEIGHT: 152 LBS

## 2021-11-15 DIAGNOSIS — Z34.93 PRENATAL CARE IN THIRD TRIMESTER: Primary | ICD-10-CM

## 2021-11-15 DIAGNOSIS — O36.5939 SGA (SMALL FOR GESTATIONAL AGE), FETAL, AFFECTING CARE OF MOTHER, ANTEPARTUM, THIRD TRIMESTER, OTHER FETUS: ICD-10-CM

## 2021-11-15 DIAGNOSIS — R55 POSTURAL DIZZINESS WITH PRESYNCOPE: ICD-10-CM

## 2021-11-15 DIAGNOSIS — R60.9 SWELLING: ICD-10-CM

## 2021-11-15 DIAGNOSIS — R42 POSTURAL DIZZINESS WITH PRESYNCOPE: ICD-10-CM

## 2021-11-15 LAB
GLUCOSE UR STRIP-MCNC: NEGATIVE MG/DL
PROT UR STRIP-MCNC: NEGATIVE MG/DL

## 2021-11-15 PROCEDURE — 99214 OFFICE O/P EST MOD 30 MIN: CPT | Performed by: NURSE PRACTITIONER
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
## 2021-11-29 ENCOUNTER — ROUTINE PRENATAL (OUTPATIENT)
Dept: OBSTETRICS AND GYNECOLOGY | Facility: CLINIC | Age: 21
End: 2021-11-29

## 2021-11-29 VITALS — BODY MASS INDEX: 27.99 KG/M2 | SYSTOLIC BLOOD PRESSURE: 108 MMHG | DIASTOLIC BLOOD PRESSURE: 64 MMHG | WEIGHT: 158 LBS

## 2021-11-29 DIAGNOSIS — R94.6 ABNORMAL RESULTS OF THYROID FUNCTION STUDIES: Primary | ICD-10-CM

## 2021-11-29 DIAGNOSIS — Z34.93 PRENATAL CARE IN THIRD TRIMESTER: Primary | ICD-10-CM

## 2021-11-29 LAB
GLUCOSE UR STRIP-MCNC: NEGATIVE MG/DL
PROT UR STRIP-MCNC: NEGATIVE MG/DL

## 2021-11-29 PROCEDURE — 90715 TDAP VACCINE 7 YRS/> IM: CPT | Performed by: NURSE PRACTITIONER

## 2021-11-29 PROCEDURE — 90471 IMMUNIZATION ADMIN: CPT | Performed by: NURSE PRACTITIONER

## 2021-11-29 PROCEDURE — 99213 OFFICE O/P EST LOW 20 MIN: CPT | Performed by: NURSE PRACTITIONER
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
## 2021-12-13 ENCOUNTER — ROUTINE PRENATAL (OUTPATIENT)
Dept: OBSTETRICS AND GYNECOLOGY | Facility: CLINIC | Age: 21
End: 2021-12-13

## 2021-12-13 VITALS — DIASTOLIC BLOOD PRESSURE: 60 MMHG | BODY MASS INDEX: 28.7 KG/M2 | WEIGHT: 162 LBS | SYSTOLIC BLOOD PRESSURE: 110 MMHG

## 2021-12-13 DIAGNOSIS — Z36.9 ENCOUNTER FOR ANTENATAL SCREENING, UNSPECIFIED: Primary | ICD-10-CM

## 2021-12-13 DIAGNOSIS — Z34.93 PRENATAL CARE IN THIRD TRIMESTER: ICD-10-CM

## 2021-12-13 LAB
GLUCOSE UR STRIP-MCNC: NEGATIVE MG/DL
PROT UR STRIP-MCNC: NEGATIVE MG/DL

## 2021-12-13 PROCEDURE — 99213 OFFICE O/P EST LOW 20 MIN: CPT | Performed by: NURSE PRACTITIONER
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
## 2021-12-14 LAB
BASOPHILS # BLD AUTO: 0.1 X10E3/UL (ref 0–0.2)
BASOPHILS NFR BLD AUTO: 1 %
EOSINOPHIL # BLD AUTO: 0.1 X10E3/UL (ref 0–0.4)
EOSINOPHIL NFR BLD AUTO: 1 %
ERYTHROCYTE [DISTWIDTH] IN BLOOD BY AUTOMATED COUNT: 11.7 % (ref 11.7–15.4)
HCT VFR BLD AUTO: 33.6 % (ref 34–46.6)
HGB BLD-MCNC: 11.3 G/DL (ref 11.1–15.9)
IMM GRANULOCYTES # BLD AUTO: 0.6 X10E3/UL (ref 0–0.1)
IMM GRANULOCYTES NFR BLD AUTO: 4 %
LYMPHOCYTES # BLD AUTO: 1.8 X10E3/UL (ref 0.7–3.1)
LYMPHOCYTES NFR BLD AUTO: 13 %
MCH RBC QN AUTO: 32.1 PG (ref 26.6–33)
MCHC RBC AUTO-ENTMCNC: 33.6 G/DL (ref 31.5–35.7)
MCV RBC AUTO: 96 FL (ref 79–97)
MONOCYTES # BLD AUTO: 1.5 X10E3/UL (ref 0.1–0.9)
MONOCYTES NFR BLD AUTO: 11 %
NEUTROPHILS # BLD AUTO: 9.3 X10E3/UL (ref 1.4–7)
NEUTROPHILS NFR BLD AUTO: 70 %
PLATELET # BLD AUTO: 243 X10E3/UL (ref 150–450)
RBC # BLD AUTO: 3.52 X10E6/UL (ref 3.77–5.28)
WBC # BLD AUTO: 13.4 X10E3/UL (ref 3.4–10.8)

## 2021-12-27 ENCOUNTER — ROUTINE PRENATAL (OUTPATIENT)
Dept: OBSTETRICS AND GYNECOLOGY | Facility: CLINIC | Age: 21
End: 2021-12-27

## 2021-12-27 VITALS — BODY MASS INDEX: 29.8 KG/M2 | DIASTOLIC BLOOD PRESSURE: 88 MMHG | WEIGHT: 168.2 LBS | SYSTOLIC BLOOD PRESSURE: 128 MMHG

## 2021-12-27 DIAGNOSIS — Z34.93 PRENATAL CARE IN THIRD TRIMESTER: Primary | ICD-10-CM

## 2021-12-27 DIAGNOSIS — Z36.9 ENCOUNTER FOR ANTENATAL SCREENING, UNSPECIFIED: ICD-10-CM

## 2021-12-27 DIAGNOSIS — R60.9 SWELLING: ICD-10-CM

## 2021-12-27 LAB
GLUCOSE UR STRIP-MCNC: NEGATIVE MG/DL
PROT UR STRIP-MCNC: ABNORMAL MG/DL

## 2021-12-27 PROCEDURE — 99214 OFFICE O/P EST MOD 30 MIN: CPT | Performed by: NURSE PRACTITIONER
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
## 2021-12-28 ENCOUNTER — LAB (OUTPATIENT)
Dept: OBSTETRICS AND GYNECOLOGY | Facility: CLINIC | Age: 21
End: 2021-12-28

## 2021-12-28 DIAGNOSIS — Z36.9 ENCOUNTER FOR ANTENATAL SCREENING, UNSPECIFIED: Primary | ICD-10-CM

## 2021-12-28 LAB
ALBUMIN SERPL-MCNC: 3.3 G/DL (ref 3.9–5)
ALBUMIN/GLOB SERPL: 1.7 {RATIO} (ref 1.2–2.2)
ALP SERPL-CCNC: 108 IU/L (ref 44–121)
ALT SERPL-CCNC: 6 IU/L (ref 0–32)
AST SERPL-CCNC: 15 IU/L (ref 0–40)
BASOPHILS # BLD AUTO: 0.1 X10E3/UL (ref 0–0.2)
BASOPHILS NFR BLD AUTO: 1 %
BILIRUB SERPL-MCNC: 0.2 MG/DL (ref 0–1.2)
BUN SERPL-MCNC: 5 MG/DL (ref 6–20)
BUN/CREAT SERPL: 11 (ref 9–23)
CALCIUM SERPL-MCNC: 9.7 MG/DL (ref 8.7–10.2)
CHLORIDE SERPL-SCNC: 101 MMOL/L (ref 96–106)
CO2 SERPL-SCNC: 22 MMOL/L (ref 20–29)
CREAT SERPL-MCNC: 0.47 MG/DL (ref 0.57–1)
EOSINOPHIL # BLD AUTO: 0.2 X10E3/UL (ref 0–0.4)
EOSINOPHIL NFR BLD AUTO: 1 %
ERYTHROCYTE [DISTWIDTH] IN BLOOD BY AUTOMATED COUNT: 11.8 % (ref 11.7–15.4)
GLOBULIN SER CALC-MCNC: 2 G/DL (ref 1.5–4.5)
GLUCOSE SERPL-MCNC: 67 MG/DL (ref 65–99)
HCT VFR BLD AUTO: 35.3 % (ref 34–46.6)
HGB BLD-MCNC: 12 G/DL (ref 11.1–15.9)
IMM GRANULOCYTES # BLD AUTO: 0.5 X10E3/UL (ref 0–0.1)
IMM GRANULOCYTES NFR BLD AUTO: 3 %
LYMPHOCYTES # BLD AUTO: 1.9 X10E3/UL (ref 0.7–3.1)
LYMPHOCYTES NFR BLD AUTO: 13 %
MCH RBC QN AUTO: 31.7 PG (ref 26.6–33)
MCHC RBC AUTO-ENTMCNC: 34 G/DL (ref 31.5–35.7)
MCV RBC AUTO: 93 FL (ref 79–97)
MONOCYTES # BLD AUTO: 1.5 X10E3/UL (ref 0.1–0.9)
MONOCYTES NFR BLD AUTO: 10 %
NEUTROPHILS # BLD AUTO: 10.7 X10E3/UL (ref 1.4–7)
NEUTROPHILS NFR BLD AUTO: 72 %
PLATELET # BLD AUTO: 274 X10E3/UL (ref 150–450)
POTASSIUM SERPL-SCNC: 4.5 MMOL/L (ref 3.5–5.2)
PROT SERPL-MCNC: 5.3 G/DL (ref 6–8.5)
RBC # BLD AUTO: 3.79 X10E6/UL (ref 3.77–5.28)
SODIUM SERPL-SCNC: 135 MMOL/L (ref 134–144)
WBC # BLD AUTO: 14.8 X10E3/UL (ref 3.4–10.8)

## 2021-12-29 ENCOUNTER — HOSPITAL ENCOUNTER (INPATIENT)
Facility: HOSPITAL | Age: 21
LOS: 2 days | Discharge: HOME | End: 2021-12-31
Attending: OBSTETRICS & GYNECOLOGY | Admitting: OBSTETRICS & GYNECOLOGY

## 2021-12-29 ENCOUNTER — ANESTHESIA EVENT (OUTPATIENT)
Dept: OBSTETRICS AND GYNECOLOGY | Facility: HOSPITAL | Age: 21
End: 2021-12-29

## 2021-12-29 ENCOUNTER — ANESTHESIA (OUTPATIENT)
Dept: OBSTETRICS AND GYNECOLOGY | Facility: HOSPITAL | Age: 21
End: 2021-12-29

## 2021-12-29 PROBLEM — Z34.93 PRENATAL CARE IN THIRD TRIMESTER: Status: RESOLVED | Noted: 2021-11-29 | Resolved: 2021-12-29

## 2021-12-29 PROBLEM — O14.13 SEVERE PRE-ECLAMPSIA, THIRD TRIMESTER: Status: ACTIVE | Noted: 2021-12-29

## 2021-12-29 PROBLEM — O26.849 FETAL SIZE INCONSISTENT WITH DATES: Status: RESOLVED | Noted: 2021-10-18 | Resolved: 2021-12-29

## 2021-12-29 PROBLEM — Z34.90 PREGNANCY: Status: RESOLVED | Noted: 2021-08-16 | Resolved: 2021-12-29

## 2021-12-29 PROBLEM — Z34.92 PRENATAL CARE IN SECOND TRIMESTER: Status: RESOLVED | Noted: 2021-10-18 | Resolved: 2021-12-29

## 2021-12-29 LAB
A1 MICROGLOB PLACENTAL VAG QL: POSITIVE
ABO GROUP BLD: NORMAL
ALBUMIN SERPL-MCNC: 3.2 G/DL (ref 3.5–5.2)
ALBUMIN/GLOB SERPL: 1.3 G/DL
ALP SERPL-CCNC: 106 U/L (ref 39–117)
ALT SERPL W P-5'-P-CCNC: <5 U/L (ref 1–33)
AMPHET+METHAMPHET UR QL: NEGATIVE
AMPHETAMINES UR QL: NEGATIVE
ANION GAP SERPL CALCULATED.3IONS-SCNC: 12.7 MMOL/L (ref 5–15)
AST SERPL-CCNC: 15 U/L (ref 1–32)
BACTERIA UR QL AUTO: ABNORMAL /HPF
BARBITURATES UR QL SCN: NEGATIVE
BENZODIAZ UR QL SCN: NEGATIVE
BILIRUB SERPL-MCNC: 0.2 MG/DL (ref 0–1.2)
BILIRUB UR QL STRIP: NEGATIVE
BLD GP AB SCN SERPL QL: NEGATIVE
BUN SERPL-MCNC: 6 MG/DL (ref 6–20)
BUN/CREAT SERPL: 12.2 (ref 7–25)
BUPRENORPHINE SERPL-MCNC: NEGATIVE NG/ML
CALCIUM SPEC-SCNC: 9.1 MG/DL (ref 8.6–10.5)
CANNABINOIDS SERPL QL: NEGATIVE
CHLORIDE SERPL-SCNC: 103 MMOL/L (ref 98–107)
CLARITY UR: CLEAR
CO2 SERPL-SCNC: 20.3 MMOL/L (ref 22–29)
COCAINE UR QL: NEGATIVE
COLOR UR: YELLOW
CREAT SERPL-MCNC: 0.49 MG/DL (ref 0.57–1)
DEPRECATED RDW RBC AUTO: 43.3 FL (ref 37–54)
ERYTHROCYTE [DISTWIDTH] IN BLOOD BY AUTOMATED COUNT: 12.4 % (ref 12.3–15.4)
GFR SERPL CREATININE-BSD FRML MDRD: >150 ML/MIN/1.73
GLOBULIN UR ELPH-MCNC: 2.5 GM/DL
GLUCOSE SERPL-MCNC: 103 MG/DL (ref 65–99)
GLUCOSE UR STRIP-MCNC: NEGATIVE MG/DL
HCT VFR BLD AUTO: 37 % (ref 34–46.6)
HGB BLD-MCNC: 12.6 G/DL (ref 12–15.9)
HGB UR QL STRIP.AUTO: ABNORMAL
HYALINE CASTS UR QL AUTO: ABNORMAL /LPF
KETONES UR QL STRIP: NEGATIVE
LEUKOCYTE ESTERASE UR QL STRIP.AUTO: NEGATIVE
MAGNESIUM SERPL-MCNC: 4.3 MG/DL (ref 1.6–2.6)
MCH RBC QN AUTO: 32.6 PG (ref 26.6–33)
MCHC RBC AUTO-ENTMCNC: 34.1 G/DL (ref 31.5–35.7)
MCV RBC AUTO: 95.6 FL (ref 79–97)
METHADONE UR QL SCN: NEGATIVE
NITRITE UR QL STRIP: NEGATIVE
OPIATES UR QL: NEGATIVE
OXYCODONE UR QL SCN: NEGATIVE
PCP UR QL SCN: NEGATIVE
PH UR STRIP.AUTO: 8.5 [PH] (ref 4.5–8)
PLATELET # BLD AUTO: 267 10*3/MM3 (ref 140–450)
PMV BLD AUTO: 11 FL (ref 6–12)
POTASSIUM SERPL-SCNC: 3.9 MMOL/L (ref 3.5–5.2)
PROPOXYPH UR QL: NEGATIVE
PROT 24H UR-MRATE: 5097 MG/24 HR (ref 30–150)
PROT SERPL-MCNC: 5.7 G/DL (ref 6–8.5)
PROT UR QL STRIP: ABNORMAL
PROT UR-MCNC: 169.9 MG/DL
RBC # BLD AUTO: 3.87 10*6/MM3 (ref 3.77–5.28)
RBC # UR STRIP: ABNORMAL /HPF
REF LAB TEST METHOD: ABNORMAL
RH BLD: POSITIVE
SARS-COV-2 RNA PNL SPEC NAA+PROBE: NOT DETECTED
SODIUM SERPL-SCNC: 136 MMOL/L (ref 136–145)
SP GR UR STRIP: 1.02 (ref 1–1.03)
SQUAMOUS #/AREA URNS HPF: ABNORMAL /HPF
T&S EXPIRATION DATE: NORMAL
TRICYCLICS UR QL SCN: NEGATIVE
URATE SERPL-MCNC: 4.4 MG/DL (ref 2.4–5.7)
UROBILINOGEN UR QL STRIP: ABNORMAL
WBC # UR STRIP: ABNORMAL /HPF
WBC NRBC COR # BLD: 16.24 10*3/MM3 (ref 3.4–10.8)

## 2021-12-29 PROCEDURE — 86900 BLOOD TYPING SEROLOGIC ABO: CPT | Performed by: OBSTETRICS & GYNECOLOGY

## 2021-12-29 PROCEDURE — 85027 COMPLETE CBC AUTOMATED: CPT | Performed by: OBSTETRICS & GYNECOLOGY

## 2021-12-29 PROCEDURE — 83735 ASSAY OF MAGNESIUM: CPT | Performed by: OBSTETRICS & GYNECOLOGY

## 2021-12-29 PROCEDURE — 59410 OBSTETRICAL CARE: CPT | Performed by: OBSTETRICS & GYNECOLOGY

## 2021-12-29 PROCEDURE — C1755 CATHETER, INTRASPINAL: HCPCS | Performed by: NURSE ANESTHETIST, CERTIFIED REGISTERED

## 2021-12-29 PROCEDURE — 80306 DRUG TEST PRSMV INSTRMNT: CPT | Performed by: OBSTETRICS & GYNECOLOGY

## 2021-12-29 PROCEDURE — 0 CEFAZOLIN SODIUM-DEXTROSE 2-3 GM-%(50ML) RECONSTITUTED SOLUTION: Performed by: OBSTETRICS & GYNECOLOGY

## 2021-12-29 PROCEDURE — 25010000002 BETAMETHASONE ACET & SOD PHOS PER 4 MG: Performed by: OBSTETRICS & GYNECOLOGY

## 2021-12-29 PROCEDURE — 86901 BLOOD TYPING SEROLOGIC RH(D): CPT | Performed by: OBSTETRICS & GYNECOLOGY

## 2021-12-29 PROCEDURE — 88307 TISSUE EXAM BY PATHOLOGIST: CPT

## 2021-12-29 PROCEDURE — 84112 EVAL AMNIOTIC FLUID PROTEIN: CPT | Performed by: OBSTETRICS & GYNECOLOGY

## 2021-12-29 PROCEDURE — 86850 RBC ANTIBODY SCREEN: CPT | Performed by: OBSTETRICS & GYNECOLOGY

## 2021-12-29 PROCEDURE — 81001 URINALYSIS AUTO W/SCOPE: CPT | Performed by: OBSTETRICS & GYNECOLOGY

## 2021-12-29 PROCEDURE — 80053 COMPREHEN METABOLIC PANEL: CPT | Performed by: OBSTETRICS & GYNECOLOGY

## 2021-12-29 PROCEDURE — 0 MAGNESIUM SULFATE 20 GM/500ML SOLUTION: Performed by: OBSTETRICS & GYNECOLOGY

## 2021-12-29 PROCEDURE — 84550 ASSAY OF BLOOD/URIC ACID: CPT | Performed by: NURSE PRACTITIONER

## 2021-12-29 PROCEDURE — 87635 SARS-COV-2 COVID-19 AMP PRB: CPT | Performed by: OBSTETRICS & GYNECOLOGY

## 2021-12-29 RX ORDER — CEFAZOLIN SODIUM 2 G/50ML
2 SOLUTION INTRAVENOUS ONCE
Status: COMPLETED | OUTPATIENT
Start: 2021-12-29 | End: 2021-12-29

## 2021-12-29 RX ORDER — SODIUM CHLORIDE, SODIUM LACTATE, POTASSIUM CHLORIDE, CALCIUM CHLORIDE 600; 310; 30; 20 MG/100ML; MG/100ML; MG/100ML; MG/100ML
125 INJECTION, SOLUTION INTRAVENOUS CONTINUOUS
Status: DISCONTINUED | OUTPATIENT
Start: 2021-12-29 | End: 2021-12-31 | Stop reason: HOSPADM

## 2021-12-29 RX ORDER — LIDOCAINE HYDROCHLORIDE 10 MG/ML
5 INJECTION, SOLUTION EPIDURAL; INFILTRATION; INTRACAUDAL; PERINEURAL AS NEEDED
Status: DISCONTINUED | OUTPATIENT
Start: 2021-12-29 | End: 2021-12-29

## 2021-12-29 RX ORDER — SUFENTANIL CITRATE 50 UG/ML
INJECTION EPIDURAL; INTRAVENOUS
Status: DISPENSED
Start: 2021-12-29 | End: 2021-12-29

## 2021-12-29 RX ORDER — BETAMETHASONE SODIUM PHOSPHATE AND BETAMETHASONE ACETATE 3; 3 MG/ML; MG/ML
12 INJECTION, SUSPENSION INTRA-ARTICULAR; INTRALESIONAL; INTRAMUSCULAR; SOFT TISSUE ONCE
Status: COMPLETED | OUTPATIENT
Start: 2021-12-29 | End: 2021-12-29

## 2021-12-29 RX ORDER — MAGNESIUM SULFATE HEPTAHYDRATE 40 MG/ML
2 INJECTION, SOLUTION INTRAVENOUS CONTINUOUS
Status: DISCONTINUED | OUTPATIENT
Start: 2021-12-29 | End: 2021-12-31 | Stop reason: HOSPADM

## 2021-12-29 RX ORDER — OXYTOCIN/0.9 % SODIUM CHLORIDE 30/500 ML
PLASTIC BAG, INJECTION (ML) INTRAVENOUS
Status: COMPLETED
Start: 2021-12-29 | End: 2021-12-29

## 2021-12-29 RX ORDER — SODIUM CHLORIDE 0.9 % (FLUSH) 0.9 %
10 SYRINGE (ML) INJECTION AS NEEDED
Status: DISCONTINUED | OUTPATIENT
Start: 2021-12-29 | End: 2021-12-29

## 2021-12-29 RX ORDER — LIDOCAINE HYDROCHLORIDE AND EPINEPHRINE 15; 5 MG/ML; UG/ML
INJECTION, SOLUTION EPIDURAL AS NEEDED
Status: DISCONTINUED | OUTPATIENT
Start: 2021-12-29 | End: 2021-12-29 | Stop reason: SURG

## 2021-12-29 RX ORDER — SODIUM CHLORIDE, SODIUM LACTATE, POTASSIUM CHLORIDE, CALCIUM CHLORIDE 600; 310; 30; 20 MG/100ML; MG/100ML; MG/100ML; MG/100ML
125 INJECTION, SOLUTION INTRAVENOUS CONTINUOUS
Status: DISCONTINUED | OUTPATIENT
Start: 2021-12-29 | End: 2021-12-29

## 2021-12-29 RX ORDER — CARBOPROST TROMETHAMINE 250 UG/ML
250 INJECTION, SOLUTION INTRAMUSCULAR
Status: DISCONTINUED | OUTPATIENT
Start: 2021-12-29 | End: 2021-12-31 | Stop reason: HOSPADM

## 2021-12-29 RX ORDER — PRENATAL VIT/IRON FUM/FOLIC AC 27MG-0.8MG
1 TABLET ORAL DAILY
Refills: 6 | Status: DISCONTINUED | OUTPATIENT
Start: 2021-12-29 | End: 2021-12-31 | Stop reason: HOSPADM

## 2021-12-29 RX ORDER — SODIUM CHLORIDE 0.9 % (FLUSH) 0.9 %
10 SYRINGE (ML) INJECTION EVERY 12 HOURS SCHEDULED
Status: DISCONTINUED | OUTPATIENT
Start: 2021-12-29 | End: 2021-12-29

## 2021-12-29 RX ORDER — MISOPROSTOL 200 UG/1
800 TABLET ORAL ONCE AS NEEDED
Status: DISCONTINUED | OUTPATIENT
Start: 2021-12-29 | End: 2021-12-31 | Stop reason: HOSPADM

## 2021-12-29 RX ORDER — EPHEDRINE SULFATE 50 MG/ML
10 INJECTION, SOLUTION INTRAVENOUS
Status: DISCONTINUED | OUTPATIENT
Start: 2021-12-29 | End: 2021-12-29

## 2021-12-29 RX ORDER — CEFAZOLIN SODIUM 1 G/50ML
1 SOLUTION INTRAVENOUS EVERY 8 HOURS
Status: DISCONTINUED | OUTPATIENT
Start: 2021-12-29 | End: 2021-12-29

## 2021-12-29 RX ORDER — METHYLERGONOVINE MALEATE 0.2 MG/ML
200 INJECTION INTRAVENOUS ONCE AS NEEDED
Status: DISCONTINUED | OUTPATIENT
Start: 2021-12-29 | End: 2021-12-31 | Stop reason: HOSPADM

## 2021-12-29 RX ORDER — OXYTOCIN/0.9 % SODIUM CHLORIDE 30/500 ML
2-20 PLASTIC BAG, INJECTION (ML) INTRAVENOUS
Status: DISCONTINUED | OUTPATIENT
Start: 2021-12-29 | End: 2021-12-29

## 2021-12-29 RX ORDER — SODIUM CHLORIDE 0.9 % (FLUSH) 0.9 %
3 SYRINGE (ML) INJECTION EVERY 12 HOURS SCHEDULED
Status: DISCONTINUED | OUTPATIENT
Start: 2021-12-29 | End: 2021-12-29

## 2021-12-29 RX ADMIN — OXYTOCIN-SODIUM CHLORIDE 0.9% IV SOLN 30 UNIT/500ML 85 UNITS: 30-0.9/5 SOLUTION at 18:08

## 2021-12-29 RX ADMIN — CEFAZOLIN SODIUM 2 G: 2 SOLUTION INTRAVENOUS at 08:18

## 2021-12-29 RX ADMIN — SODIUM CHLORIDE, POTASSIUM CHLORIDE, SODIUM LACTATE AND CALCIUM CHLORIDE 1000 ML: 600; 310; 30; 20 INJECTION, SOLUTION INTRAVENOUS at 08:05

## 2021-12-29 RX ADMIN — LIDOCAINE HYDROCHLORIDE AND EPINEPHRINE 2 ML: 15; 5 INJECTION, SOLUTION EPIDURAL at 09:28

## 2021-12-29 RX ADMIN — SODIUM CHLORIDE, POTASSIUM CHLORIDE, SODIUM LACTATE AND CALCIUM CHLORIDE 125 ML/HR: 600; 310; 30; 20 INJECTION, SOLUTION INTRAVENOUS at 09:10

## 2021-12-29 RX ADMIN — Medication 85 UNITS: at 18:08

## 2021-12-29 RX ADMIN — PRENATAL VIT W/ FE FUMARATE-FA TAB 27-0.8 MG 1 TABLET: 27-0.8 TAB at 16:01

## 2021-12-29 RX ADMIN — LIDOCAINE HYDROCHLORIDE AND EPINEPHRINE 3 ML: 15; 5 INJECTION, SOLUTION EPIDURAL at 09:23

## 2021-12-29 RX ADMIN — MAGNESIUM SULFATE HEPTAHYDRATE 2 G/HR: 40 INJECTION, SOLUTION INTRAVENOUS at 16:29

## 2021-12-29 RX ADMIN — OXYTOCIN-SODIUM CHLORIDE 0.9% IV SOLN 30 UNIT/500ML 2 MILLI-UNITS/MIN: 30-0.9/5 SOLUTION at 14:53

## 2021-12-29 RX ADMIN — SODIUM CHLORIDE, POTASSIUM CHLORIDE, SODIUM LACTATE AND CALCIUM CHLORIDE 125 ML/HR: 600; 310; 30; 20 INJECTION, SOLUTION INTRAVENOUS at 10:11

## 2021-12-29 RX ADMIN — SUFENTANIL CITRATE 11 ML/HR: 50 INJECTION EPIDURAL; INTRAVENOUS at 09:36

## 2021-12-29 RX ADMIN — BETAMETHASONE ACETATE AND BETAMETHASONE SODIUM PHOSPHATE 12 MG: 3; 3 INJECTION, SUSPENSION INTRA-ARTICULAR; INTRALESIONAL; INTRAMUSCULAR; SOFT TISSUE at 08:17

## 2021-12-29 RX ADMIN — Medication 2 MILLI-UNITS/MIN: at 14:53

## 2021-12-29 NOTE — ANESTHESIA PROCEDURE NOTES
Labor Epidural      Patient reassessed immediately prior to procedure    Patient location during procedure: OB  Start Time: 12/29/2021 9:15 AM  Stop Time: 12/29/2021 9:23 AM  Indication:at surgeon's request  Performed By  CRNA: Key Schreiber CRNA  Preanesthetic Checklist  Completed: patient identified, IV checked, site marked, risks and benefits discussed, surgical consent, monitors and equipment checked, pre-op evaluation and timeout performed  Additional Notes  Lidocaine 2% 1ml skin infiltration   Prep:  Pt Position:sitting  Sterile Tech:cap, gloves, mask and sterile barrier  Prep:povidone-iodine 7.5% surgical scrub  Monitoring:blood pressure monitoring, continuous pulse oximetry and EKG  Epidural Block Procedure:  Approach:midline  Guidance:landmark technique and palpation technique  Location:L4-L5  Needle Type:Tuohy  Needle Gauge:17 G  Loss of Resistance Medium: air  Paresthesia: none  Aspiration:negative  Test Dose:negative  Med administered at 12/29/2021 9:23 AM  Number of Attempts: 1  Post Assessment:  Dressing:secured with tape  Pt Tolerance:patient tolerated the procedure well with no apparent complications  Complications:no

## 2021-12-29 NOTE — ANESTHESIA PREPROCEDURE EVALUATION
Anesthesia Evaluation     Patient summary reviewed and Nursing notes reviewed   no history of anesthetic complications:  NPO Solid Status: N/A  NPO Liquid Status: N/A           Airway   Mallampati: II  TM distance: >3 FB  Neck ROM: full  Possible difficult intubation  Dental      Pulmonary     breath sounds clear to auscultation  (+) a smoker Former, asthma (as a child ),  Cardiovascular   Exercise tolerance: good (4-7 METS)    Rhythm: regular  Rate: normal    (+) valvular problems/murmurs (with pregnancy) murmur,       Neuro/Psych  (+) seizures (as a child due to medications ), headaches, dizziness/light headedness, psychiatric history Bipolar,     GI/Hepatic/Renal/Endo - negative ROS     Musculoskeletal (-) negative ROS    Abdominal    Substance History   (+) drug use (MJ use prior to pregnancy)     OB/GYN    (+) Pregnant, Preeclampsia,         Other - negative ROS                     Anesthesia Plan    ASA 2     epidural     intravenous induction     Anesthetic plan, all risks, benefits, and alternatives have been provided, discussed and informed consent has been obtained with: patient.  Use of blood products discussed with patient  Consented to blood products.

## 2021-12-29 NOTE — ANESTHESIA POSTPROCEDURE EVALUATION
Patient: Ana Enriquez    Procedure Summary     Date: 12/29/21 Room / Location:     Anesthesia Start: 0908 Anesthesia Stop: 1521    Procedure: LABOR ANALGESIA Diagnosis:     Scheduled Providers:  Provider: Key Schreiber CRNA    Anesthesia Type: epidural ASA Status: 2          Anesthesia Type: epidural    Vitals  Vitals Value Taken Time   /67 12/29/21 1411   Temp 99.2 °F (37.3 °C) 12/29/21 1405   Pulse 82 12/29/21 1411   Resp     SpO2     Vitals shown include unvalidated device data.        Post Anesthesia Care and Evaluation    Patient location during evaluation: bedside  Patient participation: complete - patient participated  Level of consciousness: awake and alert  Pain score: 0  Pain management: adequate  Airway patency: patent  Anesthetic complications: No anesthetic complications  PONV Status: none  Cardiovascular status: acceptable  Respiratory status: acceptable  Hydration status: acceptable  Post Neuraxial Block status: No signs or symptoms of PDPH

## 2021-12-30 LAB
HCT VFR BLD AUTO: 35.7 % (ref 34–46.6)
HGB BLD-MCNC: 12 G/DL (ref 12–15.9)

## 2021-12-30 PROCEDURE — 85014 HEMATOCRIT: CPT | Performed by: OBSTETRICS & GYNECOLOGY

## 2021-12-30 PROCEDURE — 0 MAGNESIUM SULFATE 20 GM/500ML SOLUTION: Performed by: OBSTETRICS & GYNECOLOGY

## 2021-12-30 PROCEDURE — 85018 HEMOGLOBIN: CPT | Performed by: OBSTETRICS & GYNECOLOGY

## 2021-12-30 PROCEDURE — 0503F POSTPARTUM CARE VISIT: CPT | Performed by: NURSE PRACTITIONER

## 2021-12-30 RX ORDER — DOCUSATE SODIUM 100 MG/1
100 CAPSULE, LIQUID FILLED ORAL 2 TIMES DAILY
Status: DISCONTINUED | OUTPATIENT
Start: 2021-12-30 | End: 2021-12-31 | Stop reason: HOSPADM

## 2021-12-30 RX ORDER — IBUPROFEN 800 MG/1
800 TABLET ORAL EVERY 8 HOURS PRN
Status: DISCONTINUED | OUTPATIENT
Start: 2021-12-30 | End: 2021-12-31 | Stop reason: HOSPADM

## 2021-12-30 RX ADMIN — MAGNESIUM SULFATE HEPTAHYDRATE 2 G/HR: 40 INJECTION, SOLUTION INTRAVENOUS at 10:14

## 2021-12-30 RX ADMIN — WITCH HAZEL: 500 SOLUTION RECTAL; TOPICAL at 08:56

## 2021-12-30 RX ADMIN — SODIUM CHLORIDE, POTASSIUM CHLORIDE, SODIUM LACTATE AND CALCIUM CHLORIDE 125 ML/HR: 600; 310; 30; 20 INJECTION, SOLUTION INTRAVENOUS at 14:21

## 2021-12-30 RX ADMIN — DOCUSATE SODIUM 100 MG: 100 CAPSULE, LIQUID FILLED ORAL at 08:57

## 2021-12-30 RX ADMIN — MAGNESIUM SULFATE HEPTAHYDRATE 2 G/HR: 40 INJECTION, SOLUTION INTRAVENOUS at 00:35

## 2021-12-30 RX ADMIN — BENZOCAINE AND LEVOMENTHOL: 200; 5 SPRAY TOPICAL at 08:56

## 2021-12-30 RX ADMIN — SODIUM CHLORIDE, POTASSIUM CHLORIDE, SODIUM LACTATE AND CALCIUM CHLORIDE 75 ML/HR: 600; 310; 30; 20 INJECTION, SOLUTION INTRAVENOUS at 00:34

## 2021-12-30 RX ADMIN — IBUPROFEN 800 MG: 800 TABLET ORAL at 08:57

## 2021-12-30 RX ADMIN — PRENATAL VIT W/ FE FUMARATE-FA TAB 27-0.8 MG 1 TABLET: 27-0.8 TAB at 08:56

## 2021-12-30 RX ADMIN — IBUPROFEN 800 MG: 800 TABLET ORAL at 18:00

## 2021-12-31 VITALS
WEIGHT: 168 LBS | SYSTOLIC BLOOD PRESSURE: 137 MMHG | RESPIRATION RATE: 18 BRPM | BODY MASS INDEX: 29.77 KG/M2 | DIASTOLIC BLOOD PRESSURE: 80 MMHG | HEIGHT: 63 IN | HEART RATE: 88 BPM | TEMPERATURE: 98.6 F | OXYGEN SATURATION: 99 %

## 2021-12-31 PROBLEM — Z37.9 NORMAL LABOR: Status: ACTIVE | Noted: 2021-12-31

## 2021-12-31 LAB — B-HEM STREP SPEC QL CULT: NEGATIVE

## 2021-12-31 PROCEDURE — 25010000002 MEASLES, MUMPS & RUBELLA VAC RECONSTITUTED SOLUTION: Performed by: OBSTETRICS & GYNECOLOGY

## 2021-12-31 PROCEDURE — 90707 MMR VACCINE SC: CPT | Performed by: OBSTETRICS & GYNECOLOGY

## 2021-12-31 PROCEDURE — 0503F POSTPARTUM CARE VISIT: CPT | Performed by: OBSTETRICS & GYNECOLOGY

## 2021-12-31 PROCEDURE — 90471 IMMUNIZATION ADMIN: CPT | Performed by: OBSTETRICS & GYNECOLOGY

## 2021-12-31 RX ORDER — IBUPROFEN 800 MG/1
800 TABLET ORAL EVERY 8 HOURS PRN
Qty: 30 TABLET | Refills: 0 | Status: SHIPPED | OUTPATIENT
Start: 2021-12-31 | End: 2022-05-30

## 2021-12-31 RX ORDER — PSEUDOEPHEDRINE HCL 30 MG
100 TABLET ORAL 2 TIMES DAILY
Qty: 30 CAPSULE | Refills: 1 | Status: SHIPPED | OUTPATIENT
Start: 2021-12-31 | End: 2022-05-30

## 2021-12-31 RX ADMIN — PRENATAL VIT W/ FE FUMARATE-FA TAB 27-0.8 MG 1 TABLET: 27-0.8 TAB at 08:49

## 2021-12-31 RX ADMIN — DOCUSATE SODIUM 100 MG: 100 CAPSULE, LIQUID FILLED ORAL at 08:49

## 2021-12-31 RX ADMIN — MEASLES, MUMPS, AND RUBELLA VIRUS VACCINE LIVE 0.5 ML: 1000; 12500; 1000 INJECTION, POWDER, LYOPHILIZED, FOR SUSPENSION SUBCUTANEOUS at 13:40

## 2022-01-01 NOTE — CASE MANAGEMENT/SOCIAL WORK
Case Management Discharge Note      Final Note: dc home         Selected Continued Care - Discharged on 12/31/2021 Admission date: 12/29/2021 - Discharge disposition: Home or Self Care    Destination    No services have been selected for the patient.              Durable Medical Equipment    No services have been selected for the patient.              Dialysis/Infusion    No services have been selected for the patient.              Home Medical Care    No services have been selected for the patient.              Therapy    No services have been selected for the patient.              Community Resources    No services have been selected for the patient.              Community & DME    No services have been selected for the patient.                       Final Discharge Disposition Code: 01 - home or self-care

## 2022-01-07 ENCOUNTER — HOSPITAL ENCOUNTER (OUTPATIENT)
Dept: LACTATION | Facility: HOSPITAL | Age: 22
Discharge: HOME | End: 2022-01-07

## 2022-01-07 NOTE — LACTATION NOTE
P1 Delivered at [facility] .  Baby Grace is 36w5d and was 5lbs 13.8 at birth. She is Moderately jaundiced and sleepy and today is down to 5-5.5. If she drinks from a bottle ( neosure or EBM) it takes one hour for her to transfer 30cc. She has been trying to feed baby q 2 hours days and q 3 hours nights but baby is expending too much energy trying to be fed q 2 hours. Patient is an excellent mom , very receptive and is great at reading baby's signals . She saw her pediatrician  on Tuesday and baby's weight is holding steady and she has a follow-up this Monday. Ana will make an appt for OP follow-up as well.  BW 5-13.8  LW 5-6  At ped  Today 5-5.5  Took 20 cc at breast and then choked from rapid ejection and took 10cc from bottle.    Mom has pumped 1-x per day   Given [employer] 2 pump.

## 2022-01-08 LAB
LAB AP CASE REPORT: NORMAL
PATH REPORT.FINAL DX SPEC: NORMAL

## 2022-01-13 ENCOUNTER — APPOINTMENT (OUTPATIENT)
Dept: LACTATION | Facility: HOSPITAL | Age: 22
End: 2022-01-13

## 2022-01-13 ENCOUNTER — POSTPARTUM VISIT (OUTPATIENT)
Dept: OBSTETRICS AND GYNECOLOGY | Facility: CLINIC | Age: 22
End: 2022-01-13

## 2022-01-13 VITALS
WEIGHT: 143 LBS | BODY MASS INDEX: 25.34 KG/M2 | DIASTOLIC BLOOD PRESSURE: 72 MMHG | HEIGHT: 63 IN | SYSTOLIC BLOOD PRESSURE: 110 MMHG

## 2022-01-13 PROCEDURE — 0503F POSTPARTUM CARE VISIT: CPT | Performed by: OBSTETRICS & GYNECOLOGY

## 2022-01-13 RX ORDER — MEDROXYPROGESTERONE ACETATE 150 MG/ML
150 INJECTION, SUSPENSION INTRAMUSCULAR
Qty: 1 ML | Refills: 3 | Status: SHIPPED | OUTPATIENT
Start: 2022-01-13 | End: 2022-05-18 | Stop reason: SDUPTHER

## 2022-01-13 NOTE — PROGRESS NOTES
"Postpartum Note    Chief Complaint: Postpartum Visit    HPI      Date of delivery: 21    The patient feels well. Patient describes her bleeding as staining only.     Breastfeeding: Breast and Bottle. Pt had severe pre eclampsia and had 24hrs of Magnesium Sulfate therapy. She is on no antihypertensives. Reports her swelling is improved. Still getting HA's prn.    Review of Systems   Constitutional: Negative for appetite change, chills, fatigue, fever and unexpected weight change.   Gastrointestinal: Negative for abdominal distention, abdominal pain, anal bleeding, blood in stool, constipation, diarrhea, nausea and vomiting.   Genitourinary: Positive for vaginal bleeding. Negative for dyspareunia, dysuria, menstrual problem, pelvic pain, vaginal discharge and vaginal pain.       The following portions of the patient's history were reviewed and updated as appropriate: allergies, current medications and problem list.             /72   Ht 160 cm (63\")   Wt 64.9 kg (143 lb)   LMP 2021   BMI 25.33 kg/m²       Physical Exam  Vitals reviewed.   Constitutional:       General: She is not in acute distress.     Appearance: Normal appearance. She is normal weight. She is not ill-appearing, toxic-appearing or diaphoretic.   Neurological:      General: No focal deficit present.      Mental Status: She is alert and oriented to person, place, and time. Mental status is at baseline.      Motor: No weakness.   Psychiatric:         Mood and Affect: Mood normal.         Behavior: Behavior normal.         Thought Content: Thought content normal.         Judgment: Judgment normal.               20yo  s/p         1) PPD#21: Progressing well.     2) Postpartum Care: Breast and Bottle feeding female infant. Denies any PP depression.    3) Gyn HM: LPS 2021    4) Contraception: Rx Depo Provera    5) Severe pre eclampsia: 24hr usine 5, 096mg of protein. S/p Magnesium Sulfate x 24 hrs. On no " antihypertensives    5) FU 1 year/prn          Neela Darnell DO  1/13/2022  13:20 EST

## 2022-01-21 ENCOUNTER — HOSPITAL ENCOUNTER (OUTPATIENT)
Dept: LACTATION | Facility: HOSPITAL | Age: 22
Discharge: HOME | End: 2022-01-21

## 2022-01-21 NOTE — LACTATION NOTE
Mom here today needing assistance with latching baby. She reports she can only get baby to latch about 2 times a day even when she tries to latch baby every feeding. Baby tends to latch to nipple and not open mouth wide. Assisted mom with starting nose to nipple and using chin tug to obtain deep latch. After several attempts, baby obtained deep latch but sometimes breaks suction (high palate). Baby BF for 18 min and transferred 2 oz and was content not to BF on the left breast. Encouraged mom to cont working on deep latching, pumping and supplementing. Mom is using her pumped milk and formula supplement. Reviewed use of personal pump and educated on maternal diet and hydration. Gave mom lactation cookie recipe. Scheduled f/u delfin. In a few days    Lactation Consult Note    Evaluation Completed: 2022 13:58 EST  Patient Name: Ana Enriquez  :  2000  MRN:  [id number]     REFERRAL  INFORMATION:                          Date of Referral: 22      Maternal Reason for Referral: latch difficulty         MATERNAL ASSESSMENT:     Breast Shape: round (22 1300)  Breast Density: full (22 1300)  Areola: elastic (22 1300)  Nipples: everted (22 1300)                MATERNAL INFANT FEEDING:     Maternal Emotional State: relaxed (22 1300)  Infant Positioning: cross-cradle (22 1300)   Signs of Milk Transfer: deep jaw excursions noted, breasts soften with feeding, audible swallow (22 1300)  Pain with Feeding: no (22 1300)                       Latch Assistance: minimal assistance (22 1300)                           Feeding Readiness Cues: rooting (22 1300)        Effective Latch During Feeding: yes (22 1300)        Prefeeding Weight (gm): 2864 g (101 oz) (22 1300)  Postfeeding Weight (gm): 2920 g (103 oz) (22 1300)  Weight Gain/Loss (gm) : 56 g (2 oz) (22 1300)      Latch: 2-->grasps breast, tongue down, lips flanged, rhythmic  sucking (22 1300)  Audible Swallowin-->spontaneous and intermittent (24 hrs old) (22 1300)  Type of Nipple: 2-->everted (after stimulation) (22 1300)  Comfort (Breast/Nipple): 2-->soft/nontender (22 1300)  Hold (Positioning): 1-->minimal assist, teach one side, mother does other, staff holds (22 1300)  Latch Score: 9 (22 1300)      EQUIPMENT TYPE:                                 BREAST PUMPING:          LACTATION REFERRALS:

## 2022-02-02 ENCOUNTER — APPOINTMENT (OUTPATIENT)
Dept: LACTATION | Facility: HOSPITAL | Age: 22
End: 2022-02-02

## 2022-02-10 ENCOUNTER — POSTPARTUM VISIT (OUTPATIENT)
Dept: OBSTETRICS AND GYNECOLOGY | Facility: CLINIC | Age: 22
End: 2022-02-10

## 2022-02-10 VITALS
DIASTOLIC BLOOD PRESSURE: 60 MMHG | BODY MASS INDEX: 25.34 KG/M2 | WEIGHT: 143 LBS | SYSTOLIC BLOOD PRESSURE: 108 MMHG | HEIGHT: 63 IN

## 2022-02-10 DIAGNOSIS — Z01.419 PAP SMEAR, LOW-RISK: ICD-10-CM

## 2022-02-10 LAB
B-HCG UR QL: NEGATIVE
BILIRUB BLD-MCNC: NEGATIVE MG/DL
CLARITY, POC: CLEAR
COLOR UR: YELLOW
EXPIRATION DATE: NORMAL
GLUCOSE UR STRIP-MCNC: NEGATIVE MG/DL
INTERNAL NEGATIVE CONTROL: NEGATIVE
INTERNAL POSITIVE CONTROL: POSITIVE
KETONES UR QL: NEGATIVE
LEUKOCYTE EST, POC: NEGATIVE
Lab: NORMAL
NITRITE UR-MCNC: NEGATIVE MG/ML
PH UR: 5 [PH] (ref 5–8)
PROT UR STRIP-MCNC: NEGATIVE MG/DL
RBC # UR STRIP: NEGATIVE /UL
SP GR UR: 1.02 (ref 1–1.03)
UROBILINOGEN UR QL: NORMAL

## 2022-02-10 PROCEDURE — 81025 URINE PREGNANCY TEST: CPT | Performed by: OBSTETRICS & GYNECOLOGY

## 2022-02-10 PROCEDURE — 0503F POSTPARTUM CARE VISIT: CPT | Performed by: OBSTETRICS & GYNECOLOGY

## 2022-02-10 RX ORDER — MEDROXYPROGESTERONE ACETATE 150 MG/ML
150 INJECTION, SUSPENSION INTRAMUSCULAR
Qty: 1 ML | Refills: 0 | Status: SHIPPED | OUTPATIENT
Start: 2022-02-10 | End: 2022-04-27

## 2022-02-10 NOTE — PROGRESS NOTES
"Postpartum Note    Chief Complaint: Postpartum Visit    HPI        Date of delivery: 12/29/21    The patient feels well. Patient describes her bleeding as staining only.     Breastfeeding: Breast and Bottle. Pt had severe pre eclampsia and had 24hrs of Magnesium Sulfate therapy. She is on no antihypertensives. Doing well. Desires depo provera for contraception    Review of Systems   Constitutional: Negative for appetite change, chills, fatigue, fever and unexpected weight change.   Gastrointestinal: Negative for abdominal distention, abdominal pain, anal bleeding, blood in stool, constipation, diarrhea, nausea and vomiting.   Genitourinary: Negative for dyspareunia, dysuria, menstrual problem, pelvic pain, vaginal bleeding, vaginal discharge and vaginal pain.       The following portions of the patient's history were reviewed and updated as appropriate: allergies, current medications and problem list.             /60   Ht 160 cm (63\")   Wt 64.9 kg (143 lb)   Breastfeeding Yes   BMI 25.33 kg/m²       Physical Exam  Vitals reviewed.   Constitutional:       General: She is not in acute distress.     Appearance: Normal appearance. She is well-developed. She is not ill-appearing, toxic-appearing or diaphoretic.   HENT:      Mouth/Throat:      Dentition: Normal dentition. No dental caries.   Cardiovascular:      Rate and Rhythm: Normal rate and regular rhythm.      Heart sounds: Normal heart sounds.   Pulmonary:      Effort: Pulmonary effort is normal. No respiratory distress.      Breath sounds: Normal breath sounds. No stridor. No wheezing.   Chest:      Comments: Breastfeeding with no complaints.  Abdominal:      General: There is no distension.      Palpations: Abdomen is soft. There is no mass.      Tenderness: There is no abdominal tenderness.   Genitourinary:     Labia:         Right: No rash, tenderness or lesion.         Left: No rash, tenderness or lesion.       Vagina: No vaginal discharge, tenderness " or bleeding.      Cervix: No cervical motion tenderness, discharge or friability.      Uterus: Not deviated, not enlarged, not fixed and not tender.       Adnexa:         Right: No mass, tenderness or fullness.          Left: No mass, tenderness or fullness.     Musculoskeletal:         General: No tenderness. Normal range of motion.   Skin:     General: Skin is warm.      Findings: No erythema or rash.   Neurological:      General: No focal deficit present.      Mental Status: She is alert and oriented to person, place, and time. Mental status is at baseline.      Cranial Nerves: No cranial nerve deficit.      Coordination: Coordination normal.   Psychiatric:         Mood and Affect: Mood normal.         Behavior: Behavior normal.         Thought Content: Thought content normal.         Judgment: Judgment normal.         20yo  s/p         1) PPD#21: Progressing well.     2) Postpartum Care: Breast and Bottle feeding female infant. Denies any PP depression.    3) Gyn HM: LPS 2021    4) Contraception: Rx Depo Provera- pt to get today.     5) Severe pre eclampsia: 24hr usine 5, 096mg of protein. S/p Magnesium Sulfate x 24 hrs. On no antihypertensives    5) FU 1 year/prn          Neela Darnell DO  2/10/2022  13:05 EST

## 2022-02-18 ENCOUNTER — CLINICAL SUPPORT (OUTPATIENT)
Dept: OBSTETRICS AND GYNECOLOGY | Facility: CLINIC | Age: 22
End: 2022-02-18

## 2022-02-18 VITALS — BODY MASS INDEX: 25.34 KG/M2 | HEIGHT: 63 IN

## 2022-02-18 DIAGNOSIS — Z30.019 ENCOUNTER FOR INITIAL PRESCRIPTION OF CONTRACEPTIVES, UNSPECIFIED CONTRACEPTIVE: Primary | ICD-10-CM

## 2022-02-18 PROCEDURE — 96372 THER/PROPH/DIAG INJ SC/IM: CPT | Performed by: OBSTETRICS & GYNECOLOGY

## 2022-02-18 RX ORDER — MEDROXYPROGESTERONE ACETATE 150 MG/ML
150 INJECTION, SUSPENSION INTRAMUSCULAR ONCE
Status: COMPLETED | OUTPATIENT
Start: 2022-02-18 | End: 2022-02-18

## 2022-02-18 RX ADMIN — MEDROXYPROGESTERONE ACETATE 150 MG: 150 INJECTION, SUSPENSION INTRAMUSCULAR at 09:46

## 2022-04-27 RX ORDER — MEDROXYPROGESTERONE ACETATE 150 MG/ML
INJECTION, SUSPENSION INTRAMUSCULAR
Qty: 1 ML | Refills: 2 | Status: SHIPPED | OUTPATIENT
Start: 2022-04-27 | End: 2022-07-19 | Stop reason: SDUPTHER

## 2022-05-02 ENCOUNTER — CLINICAL SUPPORT (OUTPATIENT)
Dept: OBSTETRICS AND GYNECOLOGY | Facility: CLINIC | Age: 22
End: 2022-05-02

## 2022-05-02 VITALS — WEIGHT: 154 LBS | BODY MASS INDEX: 27.29 KG/M2

## 2022-05-02 DIAGNOSIS — Z30.42 DEPO-PROVERA CONTRACEPTIVE STATUS: Primary | ICD-10-CM

## 2022-05-02 PROCEDURE — 96372 THER/PROPH/DIAG INJ SC/IM: CPT | Performed by: OBSTETRICS & GYNECOLOGY

## 2022-05-02 RX ORDER — MEDROXYPROGESTERONE ACETATE 150 MG/ML
150 INJECTION, SUSPENSION INTRAMUSCULAR ONCE
Status: COMPLETED | OUTPATIENT
Start: 2022-05-02 | End: 2022-05-02

## 2022-05-02 RX ADMIN — MEDROXYPROGESTERONE ACETATE 150 MG: 150 INJECTION, SUSPENSION INTRAMUSCULAR at 11:52

## 2022-05-18 RX ORDER — MEDROXYPROGESTERONE ACETATE 150 MG/ML
150 INJECTION, SUSPENSION INTRAMUSCULAR
Qty: 1 ML | Refills: 3 | Status: SHIPPED | OUTPATIENT
Start: 2022-05-18 | End: 2022-07-19 | Stop reason: SDUPTHER

## 2022-05-30 ENCOUNTER — HOSPITAL ENCOUNTER (EMERGENCY)
Facility: HOSPITAL | Age: 22
Discharge: HOME | End: 2022-05-30
Attending: EMERGENCY MEDICINE | Admitting: EMERGENCY MEDICINE

## 2022-05-30 VITALS
WEIGHT: 150 LBS | HEIGHT: 63 IN | SYSTOLIC BLOOD PRESSURE: 140 MMHG | DIASTOLIC BLOOD PRESSURE: 85 MMHG | HEART RATE: 106 BPM | RESPIRATION RATE: 18 BRPM | TEMPERATURE: 98.5 F | BODY MASS INDEX: 26.58 KG/M2 | OXYGEN SATURATION: 100 %

## 2022-05-30 DIAGNOSIS — R11.2 NAUSEA AND VOMITING, UNSPECIFIED VOMITING TYPE: Primary | ICD-10-CM

## 2022-05-30 LAB
ANION GAP SERPL CALCULATED.3IONS-SCNC: 12 MMOL/L (ref 5–15)
B-HCG UR QL: NEGATIVE
BACTERIA UR QL AUTO: ABNORMAL /HPF
BASOPHILS # BLD AUTO: 0.03 10*3/MM3 (ref 0–0.2)
BASOPHILS NFR BLD AUTO: 0.2 % (ref 0–1.5)
BILIRUB UR QL STRIP: NEGATIVE
BUN SERPL-MCNC: 13 MG/DL (ref 6–20)
BUN/CREAT SERPL: 17.6 (ref 7–25)
CALCIUM SPEC-SCNC: 9.5 MG/DL (ref 8.6–10.5)
CHLORIDE SERPL-SCNC: 104 MMOL/L (ref 98–107)
CLARITY UR: CLEAR
CO2 SERPL-SCNC: 22 MMOL/L (ref 22–29)
COLOR UR: YELLOW
CREAT SERPL-MCNC: 0.74 MG/DL (ref 0.57–1)
DEPRECATED RDW RBC AUTO: 38.7 FL (ref 37–54)
EGFRCR SERPLBLD CKD-EPI 2021: 116.8 ML/MIN/1.73
EOSINOPHIL # BLD AUTO: 0.04 10*3/MM3 (ref 0–0.4)
EOSINOPHIL NFR BLD AUTO: 0.3 % (ref 0.3–6.2)
ERYTHROCYTE [DISTWIDTH] IN BLOOD BY AUTOMATED COUNT: 11.5 % (ref 12.3–15.4)
GLUCOSE SERPL-MCNC: 121 MG/DL (ref 65–99)
GLUCOSE UR STRIP-MCNC: NEGATIVE MG/DL
HCT VFR BLD AUTO: 46.9 % (ref 34–46.6)
HGB BLD-MCNC: 15.7 G/DL (ref 12–15.9)
HGB UR QL STRIP.AUTO: NEGATIVE
HYALINE CASTS UR QL AUTO: ABNORMAL /LPF
IMM GRANULOCYTES # BLD AUTO: 0.02 10*3/MM3 (ref 0–0.05)
IMM GRANULOCYTES NFR BLD AUTO: 0.1 % (ref 0–0.5)
KETONES UR QL STRIP: ABNORMAL
LEUKOCYTE ESTERASE UR QL STRIP.AUTO: NEGATIVE
LYMPHOCYTES # BLD AUTO: 0.37 10*3/MM3 (ref 0.7–3.1)
LYMPHOCYTES NFR BLD AUTO: 2.6 % (ref 19.6–45.3)
MCH RBC QN AUTO: 30.3 PG (ref 26.6–33)
MCHC RBC AUTO-ENTMCNC: 33.5 G/DL (ref 31.5–35.7)
MCV RBC AUTO: 90.5 FL (ref 79–97)
MONOCYTES # BLD AUTO: 0.71 10*3/MM3 (ref 0.1–0.9)
MONOCYTES NFR BLD AUTO: 5.1 % (ref 5–12)
NEUTROPHILS NFR BLD AUTO: 12.83 10*3/MM3 (ref 1.7–7)
NEUTROPHILS NFR BLD AUTO: 91.7 % (ref 42.7–76)
NITRITE UR QL STRIP: NEGATIVE
PH UR STRIP.AUTO: 7 [PH] (ref 4.5–8)
PLATELET # BLD AUTO: 230 10*3/MM3 (ref 140–450)
PMV BLD AUTO: 10.7 FL (ref 6–12)
POTASSIUM SERPL-SCNC: 4.4 MMOL/L (ref 3.5–5.2)
PROT UR QL STRIP: ABNORMAL
RBC # BLD AUTO: 5.18 10*6/MM3 (ref 3.77–5.28)
RBC # UR STRIP: ABNORMAL /HPF
REF LAB TEST METHOD: ABNORMAL
SODIUM SERPL-SCNC: 138 MMOL/L (ref 136–145)
SP GR UR STRIP: 1.02 (ref 1–1.03)
SQUAMOUS #/AREA URNS HPF: ABNORMAL /HPF
UROBILINOGEN UR QL STRIP: ABNORMAL
WBC # UR STRIP: ABNORMAL /HPF
WBC NRBC COR # BLD: 14 10*3/MM3 (ref 3.4–10.8)

## 2022-05-30 PROCEDURE — 80048 BASIC METABOLIC PNL TOTAL CA: CPT | Performed by: EMERGENCY MEDICINE

## 2022-05-30 PROCEDURE — 99282 EMERGENCY DEPT VISIT SF MDM: CPT | Performed by: EMERGENCY MEDICINE

## 2022-05-30 PROCEDURE — 81001 URINALYSIS AUTO W/SCOPE: CPT | Performed by: EMERGENCY MEDICINE

## 2022-05-30 PROCEDURE — 99283 EMERGENCY DEPT VISIT LOW MDM: CPT

## 2022-05-30 PROCEDURE — 25010000002 ONDANSETRON PER 1 MG: Performed by: EMERGENCY MEDICINE

## 2022-05-30 PROCEDURE — 81025 URINE PREGNANCY TEST: CPT | Performed by: EMERGENCY MEDICINE

## 2022-05-30 PROCEDURE — 85025 COMPLETE CBC W/AUTO DIFF WBC: CPT | Performed by: EMERGENCY MEDICINE

## 2022-05-30 PROCEDURE — 96374 THER/PROPH/DIAG INJ IV PUSH: CPT

## 2022-05-30 RX ORDER — PROMETHAZINE HYDROCHLORIDE 25 MG/1
25 TABLET ORAL EVERY 6 HOURS PRN
Qty: 10 TABLET | Refills: 0 | Status: SHIPPED | OUTPATIENT
Start: 2022-05-30 | End: 2022-09-20

## 2022-05-30 RX ORDER — ONDANSETRON 2 MG/ML
8 INJECTION INTRAMUSCULAR; INTRAVENOUS ONCE
Status: COMPLETED | OUTPATIENT
Start: 2022-05-30 | End: 2022-05-30

## 2022-05-30 RX ORDER — ONDANSETRON 4 MG/1
4 TABLET, ORALLY DISINTEGRATING ORAL 4 TIMES DAILY PRN
Qty: 10 TABLET | Refills: 0 | Status: SHIPPED | OUTPATIENT
Start: 2022-05-30 | End: 2022-09-20

## 2022-05-30 RX ADMIN — SODIUM CHLORIDE 1000 ML: 9 INJECTION, SOLUTION INTRAVENOUS at 08:10

## 2022-05-30 RX ADMIN — ONDANSETRON 8 MG: 2 INJECTION INTRAMUSCULAR; INTRAVENOUS at 08:10

## 2022-06-28 ENCOUNTER — TELEPHONE (OUTPATIENT)
Dept: OBSTETRICS AND GYNECOLOGY | Facility: CLINIC | Age: 22
End: 2022-06-28

## 2022-06-28 NOTE — TELEPHONE ENCOUNTER
Caller: Ana Enriquez    Relationship to patient: Self    Best call back number: [phone]    Patient is needing: PATIENT HAS PREVIOUSLY BEEN A PATIENT OF DR KOHLER AT [facility] AND CALLED TO SCHEDULE AN APPT TO DISCUSS W/ DR KOHLER GETTING AN IUD BECAUSE HER DEPO SHOTS HAVE BEEN CAUSING SEVERE HEADACHES. THEY WERE UNABLE TO SCHEDULE HER APPT UNTIL SEPT. 2022 AND SHE DOESN'T WANT TO WAIT THAT LONG.     PATIENT CALLED INTO HUB AND REQUESTED A NEW REFERRAL TO TRANSFER ALL CARE TO [facility] AND REQUESTED CHRIS AGUIAR AND SCHED NEW GYN APPT FOR 8/8/22 TO DISCUSS SWITCHING TO IUD.     PATIENT WOULD LIKE TO KNOW IF SHE SHOULD STILL GO AHEAD AND GET HER NEXT DEPO SHOT ON 7/15/22. SHE STATED SHE USUALLY GETS THE SHOTS EVERY THREE MONTHS. SHE WOULD LIKE TO KNOW IF GETTING THE DEPO SHOT ON 7/15/22 WILL INTERFERE WITH OR POSTPONE HER BEING ABLE TO GET AN IUD.

## 2022-06-28 NOTE — TELEPHONE ENCOUNTER
Provider: SCOTT KOHLER  Caller: CAROLYN SANTOS  Relationship to Patient: SELF    Phone Number: [phone]    OK TO CALL ANYTIME/LVM      Reason for Call: PT SAID THAT THE DEPO SHOT IS NO LONGER WORKING FOR HER AND SHE WANTS TO DISCUSS AN IUD    SHE HAS A DEPO INJECTION  APPT ON 7/18/22 AND FIRST AVL WITH DR. KOHLER ON 9/15/22.    SHE WANTS TO KNOW IF SHE SHOULD STILL GET HER DEPO SHOT OR WILL IT AFFECT HER GETTING AN IUD.      When was the patient last seen: 2/10/2022

## 2022-06-28 NOTE — TELEPHONE ENCOUNTER
Spoke with Ana and advised her to go ahead and get her depo shot on 7/18/2022 and then see CHRIS Wells on 8/8/22 as a new patient. That will be fine so then you can get IUD.

## 2022-07-19 ENCOUNTER — CLINICAL SUPPORT (OUTPATIENT)
Dept: OBSTETRICS AND GYNECOLOGY | Facility: CLINIC | Age: 22
End: 2022-07-19

## 2022-07-19 VITALS — WEIGHT: 158 LBS | BODY MASS INDEX: 27.99 KG/M2

## 2022-07-19 DIAGNOSIS — Z30.42 DEPO-PROVERA CONTRACEPTIVE STATUS: Primary | ICD-10-CM

## 2022-07-19 PROCEDURE — 96372 THER/PROPH/DIAG INJ SC/IM: CPT | Performed by: OBSTETRICS & GYNECOLOGY

## 2022-07-19 RX ORDER — MEDROXYPROGESTERONE ACETATE 150 MG/ML
150 INJECTION, SUSPENSION INTRAMUSCULAR ONCE
Status: COMPLETED | OUTPATIENT
Start: 2022-07-19 | End: 2022-07-19

## 2022-07-19 RX ORDER — TOPIRAMATE 25 MG/1
25 TABLET ORAL
COMMUNITY
Start: 2022-06-07 | End: 2022-09-20

## 2022-07-19 RX ADMIN — MEDROXYPROGESTERONE ACETATE 150 MG: 150 INJECTION, SUSPENSION INTRAMUSCULAR at 09:50

## 2022-08-08 ENCOUNTER — OFFICE VISIT (OUTPATIENT)
Dept: OBSTETRICS AND GYNECOLOGY | Facility: CLINIC | Age: 22
End: 2022-08-08

## 2022-08-08 VITALS
DIASTOLIC BLOOD PRESSURE: 87 MMHG | SYSTOLIC BLOOD PRESSURE: 136 MMHG | HEIGHT: 63 IN | BODY MASS INDEX: 27.82 KG/M2 | WEIGHT: 157 LBS

## 2022-08-08 DIAGNOSIS — Z11.3 SCREENING FOR STD (SEXUALLY TRANSMITTED DISEASE): ICD-10-CM

## 2022-08-08 DIAGNOSIS — N89.8 VAGINAL ODOR: ICD-10-CM

## 2022-08-08 DIAGNOSIS — Z30.019 ENCOUNTER FOR INITIAL PRESCRIPTION OF CONTRACEPTIVES, UNSPECIFIED CONTRACEPTIVE: Primary | ICD-10-CM

## 2022-08-08 PROCEDURE — 99214 OFFICE O/P EST MOD 30 MIN: CPT | Performed by: NURSE PRACTITIONER

## 2022-08-08 RX ORDER — SODIUM FLUORIDE1.1%, POTASSIUM NITRATE 5% 5.8; 57.5 MG/ML; MG/ML
GEL, DENTIFRICE DENTAL
COMMUNITY
Start: 2022-07-19

## 2022-08-08 NOTE — PROGRESS NOTES
Chief Complaint   Patient presents with   • Contraception     New patient to discuss IUD. Last ae/pap 2022. Patient is up to date on depo provera (last injection 2022).         SUBJECTIVE:     Ana Enriquez is a 21 y.o.  who presents requesting to discuss contraceptive options. Currently using DMPA and is unhappy with this product. She is interested in IUD. Denies history of migraine with aura, denies history of DVT, there is no family history of DVT. She is a nonsmoker. BP elevated, hx preeclampsia. States this is followed by PCP and no medications have been recommended to treat. C/o frequent HA    C/o intermittent vaginal odor for several weeks, denies discharge and itching. Denies fish like odor.  This is a new problem. LMP 22. Denies a change in soaps, hygiene products. She is not using douches. Denies new partners. Denies pelvic pain or dysuria.     This is my first time meeting Ana Enriquez  She is new to our office  AE completed in 2022 with Dr Darnell    Past Medical History:   Diagnosis Date   • Cannabis abuse    • Ectopic pregnancy    • Seizure in childhood (HCC)    • Self-mutilation     cutting      Past Surgical History:   Procedure Laterality Date   • WISDOM TOOTH EXTRACTION        Social History     Tobacco Use   • Smoking status: Former Smoker     Years: 5.00     Types: Electronic Cigarette   • Smokeless tobacco: Never Used   • Tobacco comment: DENIED   Substance Use Topics   • Alcohol use: No     Comment: DENIED   • Drug use: Yes     Types: Marijuana     Comment: before pregnancy     OB History    Para Term  AB Living   2 1   1   1   SAB IAB Ectopic Molar Multiple Live Births           0 1      # Outcome Date GA Lbr Leonidas/2nd Weight Sex Delivery Anes PTL Lv   2  21 36w5d 10:14 / 01:07 2660 g (5 lb 13.8 oz) F Vag-Spont EPI Y KAMILAH   1                  Review of Systems   Constitutional: Negative for chills, fatigue and fever.   Gastrointestinal:  "Negative for abdominal distention and abdominal pain.   Genitourinary: Negative for dyspareunia, dysuria, menstrual problem, pelvic pain, vaginal bleeding, vaginal discharge and vaginal pain.        + vaginal odor  - vaginal itching    Musculoskeletal: Negative for gait problem.   Neurological: Positive for headaches.       OBJECTIVE:   Vitals:    08/08/22 1354   BP: 136/87   Weight: 71.2 kg (157 lb)   Height: 160 cm (63\")        Physical Exam  Constitutional:       General: She is not in acute distress.     Appearance: Normal appearance. She is not ill-appearing, toxic-appearing or diaphoretic.   Genitourinary:      Bladder and urethral meatus normal.      No lesions in the vagina.      Right Labia: No rash, tenderness, lesions, skin changes or Bartholin's cyst.     Left Labia: No tenderness, lesions, skin changes, Bartholin's cyst or rash.     No labial fusion noted.      No inguinal adenopathy present in the right or left side.     Vaginal discharge (white, scant) present.      No vaginal erythema, tenderness, bleeding, ulceration or granulation tissue.      No vaginal prolapse present.     No vaginal atrophy present.       Right Adnexa: not tender, not full, not palpable, no mass present and not absent.     Left Adnexa: not tender, not full, not palpable, no mass present and not absent.     No cervical motion tenderness, discharge, friability, lesion, polyp, nabothian cyst or eversion.      Uterus is not enlarged, fixed, tender, irregular or prolapsed.      No uterine mass detected.  Abdominal:      General: There is no distension.      Palpations: Abdomen is soft. There is no mass.      Tenderness: There is no abdominal tenderness. There is no guarding.      Hernia: No hernia is present. There is no hernia in the left inguinal area or right inguinal area.   Musculoskeletal:         General: Normal range of motion.   Lymphadenopathy:      Lower Body: No right inguinal adenopathy. No left inguinal adenopathy. "   Neurological:      General: No focal deficit present.      Mental Status: She is alert and oriented to person, place, and time.      Cranial Nerves: No cranial nerve deficit.   Skin:     General: Skin is warm and dry.   Psychiatric:         Mood and Affect: Mood normal.         Behavior: Behavior normal.         Thought Content: Thought content normal.         Judgment: Judgment normal.   Vitals and nursing note reviewed.       Assessment/Plan    Diagnoses and all orders for this visit:    1. Encounter for initial prescription of contraceptives, unspecified contraceptive (Primary)    2. Screening for STD (sexually transmitted disease)  -     NuSwab VG+ - Swab, Vagina    3. Vaginal odor  -     NuSwab VG+ - Swab, Vagina    Discussed contraception options at length including pills, patch, vaginal ring, POPs,  injection, implant, and IUDs.  The risks and benefits of the methods were discussed including but not limited to the increased risk of heart attack, blood clot, and stroke.  It was discussed the contraception does not protect against sexually transmitted infections and condoms are encouraged. Given elevated BP, recommend progesterone only products. She is considering nexplanon and IUD. Discussed uses and side effects of both. Reviewed placement procedures, risks benefits of nexplanon and IUD. She was provided with pamphlets for nexplanon and mirena IUD and will call to let me know which she chooses    Recommend STD screening prior to IUD placement  Vaginal cultures obtained  Continue DMPA until nexplanon or IUD is in place.     Follow up: PRN and annually    I spent 35 minutes caring for Ana on this date of service. This time includes time spent by me in the following activities: preparing for the visit, reviewing tests, obtaining and/or reviewing a separately obtained history, performing a medically appropriate examination and/or evaluation, counseling and educating the patient/family/caregiver, ordering  medications, tests, or procedures, referring and communicating with other health care professionals and documenting information in the medical record    CHRIS Stack  8/8/2022  16:30 EDT

## 2022-08-09 LAB
A VAGINAE DNA VAG QL NAA+PROBE: NORMAL SCORE
BVAB2 DNA VAG QL NAA+PROBE: NORMAL SCORE
C ALBICANS DNA VAG QL NAA+PROBE: NEGATIVE
C GLABRATA DNA VAG QL NAA+PROBE: NEGATIVE
C TRACH DNA VAG QL NAA+PROBE: NEGATIVE
MEGA1 DNA VAG QL NAA+PROBE: NORMAL SCORE
N GONORRHOEA DNA VAG QL NAA+PROBE: NEGATIVE
T VAGINALIS DNA VAG QL NAA+PROBE: NEGATIVE

## 2022-08-10 ENCOUNTER — PATIENT MESSAGE (OUTPATIENT)
Dept: OBSTETRICS AND GYNECOLOGY | Facility: CLINIC | Age: 22
End: 2022-08-10

## 2022-08-10 ENCOUNTER — PATIENT ROUNDING (BHMG ONLY) (OUTPATIENT)
Dept: OBSTETRICS AND GYNECOLOGY | Facility: CLINIC | Age: 22
End: 2022-08-10

## 2022-08-10 NOTE — PROGRESS NOTES
My chart message has been sent to the patient for PATIENT ROUNDING with [facility].

## 2022-09-07 ENCOUNTER — TELEPHONE (OUTPATIENT)
Dept: OBSTETRICS AND GYNECOLOGY | Facility: CLINIC | Age: 22
End: 2022-09-07

## 2022-09-07 NOTE — TELEPHONE ENCOUNTER
Patient calling to schedule for nexplanon insertion, is she able to do so yet?    Thanks,   Mesha

## 2022-09-07 NOTE — TELEPHONE ENCOUNTER
UNABLE TO W/T  Caller: Ana Enriquez    Relationship: Self    Best call back number: [phone]    What is the best time to reach you: ANYTIME- IT IS OKAY TO LVM    Who are you requesting to speak with (clinical staff, provider,  specific staff member): PT IS RETURNING A MISSED CALL.    Do you know the name of the person who called: DWIGHT    What was the call regarding: SCHEDULING IUD APPT    Do you require a callback: YES, PLEASE.

## 2022-09-20 ENCOUNTER — OFFICE VISIT (OUTPATIENT)
Dept: OBSTETRICS AND GYNECOLOGY | Facility: CLINIC | Age: 22
End: 2022-09-20

## 2022-09-20 VITALS
WEIGHT: 157 LBS | BODY MASS INDEX: 27.82 KG/M2 | DIASTOLIC BLOOD PRESSURE: 81 MMHG | HEIGHT: 63 IN | SYSTOLIC BLOOD PRESSURE: 130 MMHG

## 2022-09-20 DIAGNOSIS — Z30.017 INSERTION OF NEXPLANON: Primary | ICD-10-CM

## 2022-09-20 LAB
B-HCG UR QL: NEGATIVE
EXPIRATION DATE: NORMAL
INTERNAL NEGATIVE CONTROL: NORMAL
INTERNAL POSITIVE CONTROL: NORMAL
Lab: NORMAL

## 2022-09-20 PROCEDURE — 81025 URINE PREGNANCY TEST: CPT | Performed by: NURSE PRACTITIONER

## 2022-09-20 PROCEDURE — 11981 INSERTION DRUG DLVR IMPLANT: CPT | Performed by: NURSE PRACTITIONER

## 2022-09-20 NOTE — PROGRESS NOTES
Nexplanon Insertion:    Chief Complaint: Nexplanon Insertion  LNMP:9/20/22  UPT: negative  Lot #:[id number]   Expiration date:8/14/2024      Procedures      Pre Dx: 1) Nexplanon Insertion   Post Dx: 1) Nexplanon Insertion     Risks, benefits, alternatives explained. All questions answered. Consents signed.  The area for insertion prepped with betadine in a sterile fashion. About 3 mL of 1% lidocaine.     The insertion site was identified approximately 6-8 cm proximal to the left elbow crease in the underside of the upper arm overlying the groove between the biceps and triceps muscles. The skin at the insertion site was stretched by my thumb and index finger. Then inserted the needle tip, bevel side up, at an angle not greater than 20° to the skin surface, just until the skin was penetrated. The applicator was then lowered so that it was parallel to the arm. To minimize the chance of deep incision, the skin was tented upwards with the tip of the needle. The needle was then gently inserted to the full length. Then fixed the device in place on the arm with one hand. I then slowly and carefully retracted the needle cannula back along the length of the device after pushing the release button. The obturator was seen in the device to determine that the nexplanon had been released.     Both the patient and I were easily able to feel the device under the skin. Steri-Strips were applied at the site, and the arm was gently wrapped with gauze. Pt instructed to keep bandage on for 12-24 hrs.    There were no complications.   The patient tolerated the procedure well.     She was given a reminder card. She was advised to use condoms as a backup method for at least a week after insertion.    Expectations, warning signs and limitations reviewed.     Brief Urine Lab Results  (Last result in the past 365 days)      Color   Clarity   Blood   Leuk Est   Nitrite   Protein   CREAT   Urine HCG        09/20/22 1118               Negative              Assessment   Diagnoses and all orders for this visit:    1. Insertion of Nexplanon (Primary)  -     POC Pregnancy, Urine  -     Etonogestrel (NEXPLANON) 68 MG subdermal implant         Zenia Bennett, APRN  9/20/2022  11:32 EDT

## 2022-10-11 ENCOUNTER — TELEPHONE (OUTPATIENT)
Dept: OBSTETRICS AND GYNECOLOGY | Facility: CLINIC | Age: 22
End: 2022-10-11

## 2022-10-11 NOTE — TELEPHONE ENCOUNTER
Patient is calling to let provider know that she has been extremely bloated the last couple of days. She said she looked so big and bloated, that she even took a pregnancy test (negative.) I asked if she has been pooping and having gas regularly, she said yes. I asked if there has been any change in her diet, she said no. She is also wondering if she can come to you about this or does she need to go to her PCP.     Please advise,   Thank you

## 2022-10-27 ENCOUNTER — OFFICE VISIT (OUTPATIENT)
Dept: OBSTETRICS AND GYNECOLOGY | Facility: CLINIC | Age: 22
End: 2022-10-27

## 2022-10-27 VITALS
HEIGHT: 63 IN | WEIGHT: 159.4 LBS | HEART RATE: 79 BPM | SYSTOLIC BLOOD PRESSURE: 124 MMHG | DIASTOLIC BLOOD PRESSURE: 79 MMHG | BODY MASS INDEX: 28.24 KG/M2

## 2022-10-27 DIAGNOSIS — R14.0 BLOATING: Primary | ICD-10-CM

## 2022-10-27 DIAGNOSIS — R53.83 OTHER FATIGUE: ICD-10-CM

## 2022-10-27 PROCEDURE — 99214 OFFICE O/P EST MOD 30 MIN: CPT | Performed by: NURSE PRACTITIONER

## 2022-10-27 RX ORDER — ETONOGESTREL 68 MG/1
1 IMPLANT SUBCUTANEOUS ONCE
COMMUNITY

## 2022-10-27 RX ORDER — VALACYCLOVIR HYDROCHLORIDE 1 G/1
2000 TABLET, FILM COATED ORAL
COMMUNITY
Start: 2022-10-20

## 2022-10-27 NOTE — PROGRESS NOTES
Chief Complaint   Patient presents with   • Bloated     Pt c/o bloating and swelling in stomach, wrist and feet.   Pt also states her eyes have a yellow tinge. Pt had nexplanon inserted in September        SUBJECTIVE:     Ana Enriquez is a 21 y.o.  who presents with c/o bloating and swelling of wrists and feet. C/o eyes are yellow tinged. Symptoms started 3 weeks ago. Symptoms are intermittent and occurring several times throughout the day. Denies dietary changes. No new medications. No blood in stool, N&V, fevers, or chills.  c/o fatigue  This is a new problem. She is having BM daily. She is amenorrheic with nexplanon. Drinking 3 bottles of water per day. 2 coffees per day.      Past Medical History:   Diagnosis Date   • Cannabis abuse    • Ectopic pregnancy    • Seizure in childhood (HCC)    • Self-mutilation     cutting      Past Surgical History:   Procedure Laterality Date   • WISDOM TOOTH EXTRACTION        Social History     Tobacco Use   • Smoking status: Former     Types: Electronic Cigarette   • Smokeless tobacco: Never   • Tobacco comments:     DENIED   Substance Use Topics   • Alcohol use: No     Comment: DENIED   • Drug use: Yes     Types: Marijuana     Comment: before pregnancy     OB History    Para Term  AB Living   2 1   1   1   SAB IAB Ectopic Molar Multiple Live Births           0 1      # Outcome Date GA Lbr Leonidas/2nd Weight Sex Delivery Anes PTL Lv   2  21 36w5d 10:14 / 01:07 2660 g (5 lb 13.8 oz) F Vag-Spont EPI Y KAMILAH   1                  Review of Systems   Constitutional: Positive for appetite change (early satiety ) and fatigue (X3 weeks). Negative for chills, diaphoresis, fever and unexpected weight change.   HENT:        + yellow eyes   Gastrointestinal: Positive for abdominal distention. Negative for abdominal pain, blood in stool, constipation, diarrhea, nausea and vomiting.   Endocrine: Negative for cold intolerance and heat intolerance.  "  Genitourinary: Negative for dysuria, frequency, hematuria, menstrual problem, pelvic pain, vaginal bleeding, vaginal discharge and vaginal pain.   Musculoskeletal: Positive for back pain (hx back pain, this is worsening). Negative for gait problem.       OBJECTIVE:   Vitals:    10/27/22 1544   BP: 124/79   Pulse: 79   Weight: 72.3 kg (159 lb 6.4 oz)   Height: 160 cm (63\")        Physical Exam  Constitutional:       General: She is not in acute distress.     Appearance: Normal appearance. She is not ill-appearing, toxic-appearing or diaphoretic.   Eyes:      General: No scleral icterus.     Conjunctiva/sclera: Conjunctivae normal.   Cardiovascular:      Rate and Rhythm: Normal rate.   Pulmonary:      Effort: Pulmonary effort is normal.   Abdominal:      General: Bowel sounds are normal. There is no distension.      Palpations: Abdomen is soft. There is no mass.      Tenderness: There is no abdominal tenderness. There is no right CVA tenderness, left CVA tenderness, guarding or rebound.      Hernia: No hernia is present.   Musculoskeletal:         General: Normal range of motion.      Cervical back: Normal range of motion.      Right lower leg: No edema.      Left lower leg: No edema.   Neurological:      General: No focal deficit present.      Mental Status: She is alert and oriented to person, place, and time.      Cranial Nerves: No cranial nerve deficit.   Skin:     General: Skin is warm and dry.      Coloration: Skin is not jaundiced or pale.      Findings: No erythema.   Psychiatric:         Mood and Affect: Mood normal.         Behavior: Behavior normal.         Thought Content: Thought content normal.         Judgment: Judgment normal.   Vitals and nursing note reviewed.         Assessment/Plan    Diagnoses and all orders for this visit:    1. Bloating (Primary)  -     US Non-ob Transvaginal; Future    2. Other fatigue  -     CBC & Differential  -     Comprehensive Metabolic Panel  -     Vitamin D 25 " "Hydroxy  -     TSH    Discussed possible causes of intermittent bloating, she reports \"looking pregnant\" at times  Recommend f/u with PCP as well  Will check labs today, advised PCP would likely complete a more detailed lab panel  Encouraged to keep a food diary as well as documenting when bloating occurs  Likely GI in nature, however will complete TVUS to further evaluate uterus and ovaries    Follow up: 1-2 weeks for TUVS    I spent 30 minutes caring for Ana on this date of service. This time includes time spent by me in the following activities: preparing for the visit, reviewing tests, obtaining and/or reviewing a separately obtained history, performing a medically appropriate examination and/or evaluation, counseling and educating the patient/family/caregiver, ordering medications, tests, or procedures, referring and communicating with other health care professionals and documenting information in the medical record    Zenia Bennett, APRN  10/27/2022  20:13 EDT  "

## 2022-10-28 ENCOUNTER — TELEPHONE (OUTPATIENT)
Dept: OBSTETRICS AND GYNECOLOGY | Facility: CLINIC | Age: 22
End: 2022-10-28

## 2022-10-28 LAB
25(OH)D3+25(OH)D2 SERPL-MCNC: 19.6 NG/ML (ref 30–100)
ALBUMIN SERPL-MCNC: 4.9 G/DL (ref 3.5–5.2)
ALBUMIN/GLOB SERPL: 2.7 G/DL
ALP SERPL-CCNC: 79 U/L (ref 39–117)
ALT SERPL-CCNC: 9 U/L (ref 1–33)
AST SERPL-CCNC: 14 U/L (ref 1–32)
BASOPHILS # BLD AUTO: 0.05 10*3/MM3 (ref 0–0.2)
BASOPHILS NFR BLD AUTO: 0.7 % (ref 0–1.5)
BILIRUB SERPL-MCNC: 0.5 MG/DL (ref 0–1.2)
BUN SERPL-MCNC: 9 MG/DL (ref 6–20)
BUN/CREAT SERPL: 10.5 (ref 7–25)
CALCIUM SERPL-MCNC: 10.1 MG/DL (ref 8.6–10.5)
CHLORIDE SERPL-SCNC: 104 MMOL/L (ref 98–107)
CO2 SERPL-SCNC: 24.1 MMOL/L (ref 22–29)
CREAT SERPL-MCNC: 0.86 MG/DL (ref 0.57–1)
EGFRCR SERPLBLD CKD-EPI 2021: 97.5 ML/MIN/1.73
EOSINOPHIL # BLD AUTO: 0.11 10*3/MM3 (ref 0–0.4)
EOSINOPHIL NFR BLD AUTO: 1.4 % (ref 0.3–6.2)
ERYTHROCYTE [DISTWIDTH] IN BLOOD BY AUTOMATED COUNT: 12 % (ref 12.3–15.4)
GLOBULIN SER CALC-MCNC: 1.8 GM/DL
GLUCOSE SERPL-MCNC: 84 MG/DL (ref 65–99)
HCT VFR BLD AUTO: 42.1 % (ref 34–46.6)
HGB BLD-MCNC: 14.5 G/DL (ref 12–15.9)
IMM GRANULOCYTES # BLD AUTO: 0.02 10*3/MM3 (ref 0–0.05)
IMM GRANULOCYTES NFR BLD AUTO: 0.3 % (ref 0–0.5)
LYMPHOCYTES # BLD AUTO: 2.42 10*3/MM3 (ref 0.7–3.1)
LYMPHOCYTES NFR BLD AUTO: 31.8 % (ref 19.6–45.3)
MCH RBC QN AUTO: 31.1 PG (ref 26.6–33)
MCHC RBC AUTO-ENTMCNC: 34.4 G/DL (ref 31.5–35.7)
MCV RBC AUTO: 90.3 FL (ref 79–97)
MONOCYTES # BLD AUTO: 0.63 10*3/MM3 (ref 0.1–0.9)
MONOCYTES NFR BLD AUTO: 8.3 % (ref 5–12)
NEUTROPHILS # BLD AUTO: 4.39 10*3/MM3 (ref 1.7–7)
NEUTROPHILS NFR BLD AUTO: 57.5 % (ref 42.7–76)
NRBC BLD AUTO-RTO: 0 /100 WBC (ref 0–0.2)
PLATELET # BLD AUTO: 241 10*3/MM3 (ref 140–450)
POTASSIUM SERPL-SCNC: 4.4 MMOL/L (ref 3.5–5.2)
PROT SERPL-MCNC: 6.7 G/DL (ref 6–8.5)
RBC # BLD AUTO: 4.66 10*6/MM3 (ref 3.77–5.28)
SODIUM SERPL-SCNC: 140 MMOL/L (ref 136–145)
TSH SERPL DL<=0.005 MIU/L-ACNC: 1.19 UIU/ML (ref 0.27–4.2)
WBC # BLD AUTO: 7.62 10*3/MM3 (ref 3.4–10.8)

## 2022-10-28 NOTE — TELEPHONE ENCOUNTER
----- Message from CHRIS Stack sent at 10/27/2022  8:13 PM EDT -----  Zee can you please call this pt and tell her that I would like to complete u/s in the next 1-2 weeks to further evaluate her complaints? Thank you

## 2022-11-10 ENCOUNTER — TELEPHONE (OUTPATIENT)
Dept: OBSTETRICS AND GYNECOLOGY | Facility: CLINIC | Age: 22
End: 2022-11-10

## 2022-11-10 NOTE — TELEPHONE ENCOUNTER
Zenia     You ordered GYN ultrasound for bloating and pain   Overall very normal GYN ultrasound   No source of pain seen.     Elmer TAO

## 2023-04-25 ENCOUNTER — OFFICE VISIT (OUTPATIENT)
Dept: OBSTETRICS AND GYNECOLOGY | Facility: CLINIC | Age: 23
End: 2023-04-25
Payer: COMMERCIAL

## 2023-04-25 VITALS
DIASTOLIC BLOOD PRESSURE: 75 MMHG | HEIGHT: 63 IN | BODY MASS INDEX: 28.7 KG/M2 | WEIGHT: 162 LBS | SYSTOLIC BLOOD PRESSURE: 111 MMHG

## 2023-04-25 DIAGNOSIS — N89.8 VAGINAL ODOR: Primary | ICD-10-CM

## 2023-04-25 NOTE — PROGRESS NOTES
"Chief Complaint   Patient presents with   • Follow-up     Pt states having vaginal odor x1 month        SUBJECTIVE:     Ana Enriquez is a 22 y.o.  who presents with c/o vaginal odor for 1 month. Denies fish like odor. She c/o sour odor. Denies vaginal itching or discharge. Denies a change in soaps, hygiene products. She is not using douches. Denies new partners. Denies pelvic pain or dysuria. This is a new problem. She is amenorrheic with nexplanon. Recently treated for UTI by PCP    Past Medical History:   Diagnosis Date   • Cannabis abuse    • Ectopic pregnancy    • Seizure in childhood    • Self-mutilation     cutting      Past Surgical History:   Procedure Laterality Date   • WISDOM TOOTH EXTRACTION          Review of Systems   Constitutional: Negative for chills, fatigue and fever.   Gastrointestinal: Negative for abdominal distention and abdominal pain.   Genitourinary: Negative for dyspareunia, menstrual problem, pelvic pain, vaginal bleeding, vaginal discharge and vaginal pain.        + vaginal odor  - vaginal itching       OBJECTIVE:   Vitals:    23 1351   BP: 111/75   Weight: 73.5 kg (162 lb)   Height: 160 cm (63\")        Physical Exam  Constitutional:       General: She is not in acute distress.     Appearance: Normal appearance. She is not ill-appearing, toxic-appearing or diaphoretic.   Genitourinary:      Bladder and urethral meatus normal.      No lesions in the vagina.      Right Labia: No rash, tenderness, lesions, skin changes or Bartholin's cyst.     Left Labia: No tenderness, lesions, skin changes, Bartholin's cyst or rash.     No labial fusion noted.      No inguinal adenopathy present in the right or left side.     No vaginal discharge, erythema, tenderness, bleeding, ulceration or granulation tissue.      No vaginal prolapse present.     No vaginal atrophy present.       Right Adnexa: not tender, not full, not palpable, no mass present and not absent.     Left Adnexa: not " tender, not full, not palpable, no mass present and not absent.     No cervical motion tenderness, discharge, friability, lesion, polyp, nabothian cyst or eversion.      Uterus is not enlarged, fixed, tender, irregular or prolapsed.      No uterine mass detected.  Abdominal:      General: There is no distension.      Palpations: Abdomen is soft. There is no mass.      Tenderness: There is no abdominal tenderness. There is no guarding.      Hernia: No hernia is present. There is no hernia in the left inguinal area or right inguinal area.   Lymphadenopathy:      Lower Body: No right inguinal adenopathy. No left inguinal adenopathy.   Neurological:      Mental Status: She is alert.   Vitals and nursing note reviewed.         Assessment/Plan    Diagnoses and all orders for this visit:    1. Vaginal odor (Primary)  -     Mycoplasma / Ureaplasma Culture - Swab, Cervix  -     NuSwab VG+ - Swab, Vagina      No abnormal discharge or erythema noted, slight sour odor present   Vaginal cultures obtained, will await cultures and treat if indicated   Encouraged condoms with IC, cotton only underwear, mild or no soaps, avoidance of douching or mitch steaming    Return if symptoms worsen or fail to improve.    I spent 22 minutes caring for Aan on this date of service. This time includes time spent by me in the following activities: preparing for the visit, reviewing tests, obtaining and/or reviewing a separately obtained history, performing a medically appropriate examination and/or evaluation, counseling and educating the patient/family/caregiver, ordering medications, tests, or procedures, referring and communicating with other health care professionals and documenting information in the medical record    Zenia Bennett, APRN  4/25/2023  14:17 EDT

## 2023-05-04 LAB
M HOMINIS SPEC QL CULT: NEGATIVE
U UREALYTICUM SPEC QL CULT: POSITIVE

## 2023-05-04 RX ORDER — DOXYCYCLINE HYCLATE 100 MG/1
100 CAPSULE ORAL 2 TIMES DAILY
Qty: 20 CAPSULE | Refills: 0 | Status: SHIPPED | OUTPATIENT
Start: 2023-05-04 | End: 2023-05-14

## 2023-06-06 ENCOUNTER — OFFICE VISIT (OUTPATIENT)
Dept: OBSTETRICS AND GYNECOLOGY | Facility: CLINIC | Age: 23
End: 2023-06-06
Payer: COMMERCIAL

## 2023-06-06 VITALS
WEIGHT: 160.6 LBS | HEART RATE: 71 BPM | BODY MASS INDEX: 28.46 KG/M2 | SYSTOLIC BLOOD PRESSURE: 106 MMHG | HEIGHT: 63 IN | DIASTOLIC BLOOD PRESSURE: 74 MMHG

## 2023-06-06 DIAGNOSIS — N89.8 VAGINAL DISCHARGE: Primary | ICD-10-CM

## 2023-06-06 DIAGNOSIS — N89.8 VAGINAL ODOR: ICD-10-CM

## 2023-06-06 PROCEDURE — 1160F RVW MEDS BY RX/DR IN RCRD: CPT | Performed by: NURSE PRACTITIONER

## 2023-06-06 PROCEDURE — 1159F MED LIST DOCD IN RCRD: CPT | Performed by: NURSE PRACTITIONER

## 2023-06-06 PROCEDURE — 99213 OFFICE O/P EST LOW 20 MIN: CPT | Performed by: NURSE PRACTITIONER

## 2023-06-06 RX ORDER — L. ACIDOPH/L. PLANTAR/L. RHAMN 1B CELL
1 CAPSULE,DELAYED RELEASE (ENTERIC COATED) ORAL DAILY
Qty: 30 CAPSULE | Refills: 3 | Status: SHIPPED | OUTPATIENT
Start: 2023-06-06

## 2023-06-06 NOTE — PROGRESS NOTES
"Chief Complaint   Patient presents with    Follow-up     Pt presents for f/u vaginal odor. She had + ureaplasma 23. She reports some mild milky white vaginal discharge.         SUBJECTIVE:     Ana Enriquez is a 22 y.o.  who presents with c/o vaginal discharge and odor for 1 week. Denies vaginal itching. Denies a change in soaps, hygiene products. She is not using douches. Denies new partners. Denies pelvic pain or dysuria. She was treated for ureaplasma 2023, her partner was treated for as well with doxycycline. Reports that her symptoms improved with antibiotics, but returned after menses ended.     Past Medical History:   Diagnosis Date    Cannabis abuse     Ectopic pregnancy     Seizure in childhood     Self-mutilation     cutting      Past Surgical History:   Procedure Laterality Date    WISDOM TOOTH EXTRACTION          Review of Systems   Constitutional:  Negative for chills, fatigue and fever.   Gastrointestinal:  Negative for abdominal distention and abdominal pain.   Genitourinary:  Positive for vaginal discharge. Negative for dyspareunia, dysuria, frequency, menstrual problem, pelvic pain, urgency, vaginal bleeding and vaginal pain.        + vaginal odor  - vaginal itching     OBJECTIVE:   Vitals:    23 1532   BP: 106/74   Pulse: 71   Weight: 72.8 kg (160 lb 9.6 oz)   Height: 160 cm (63\")        Physical Exam  Constitutional:       General: She is not in acute distress.     Appearance: Normal appearance. She is not ill-appearing, toxic-appearing or diaphoretic.   Genitourinary:      Bladder and urethral meatus normal.      No lesions in the vagina.      Right Labia: No rash, tenderness, lesions, skin changes or Bartholin's cyst.     Left Labia: No tenderness, lesions, skin changes, Bartholin's cyst or rash.     No labial fusion noted.      No inguinal adenopathy present in the right or left side.     Vaginal discharge (scant white, no odor noted) present.      No vaginal erythema, " tenderness, bleeding, ulceration or granulation tissue.      No vaginal prolapse present.     No vaginal atrophy present.       Right Adnexa: not tender, not full, not palpable, no mass present and not absent.     Left Adnexa: not tender, not full, not palpable, no mass present and not absent.     No cervical motion tenderness, discharge, friability, lesion, polyp, nabothian cyst or eversion.      Uterus is not enlarged, fixed, tender, irregular or prolapsed.      No uterine mass detected.  Abdominal:      General: There is no distension.      Palpations: Abdomen is soft. There is no mass.      Tenderness: There is no abdominal tenderness. There is no guarding.      Hernia: No hernia is present. There is no hernia in the left inguinal area or right inguinal area.   Lymphadenopathy:      Lower Body: No right inguinal adenopathy. No left inguinal adenopathy.   Neurological:      Mental Status: She is alert.   Vitals and nursing note reviewed.       Assessment/Plan    Diagnoses and all orders for this visit:    1. Vaginal discharge (Primary)  -     Mycoplasma / Ureaplasma Culture - Swab, Cervix  -     NuSwab BV & Candida - Swab, Cervix  -     Probiotic Product (Probiotic Pearls Womens) capsule; Take 1 capsule by mouth Daily.  Dispense: 30 capsule; Refill: 3    2. Vaginal odor  -     Mycoplasma / Ureaplasma Culture - Swab, Cervix  -     NuSwab BV & Candida - Swab, Cervix  -     Probiotic Product (Probiotic Pearls Womens) capsule; Take 1 capsule by mouth Daily.  Dispense: 30 capsule; Refill: 3      Scant white discharge noted, no odor noted   Vaginal cultures obtained  Will await results and treat if indicated  Discussed concerns with recurrent antibiotic use. She has been having loose stools, but not several a day  Encouraged probiotics for both gut and vaginal health  Discussed boric acid suppositories  Encouraged condoms with IC, cotton only underwear, mild or no soaps, avoidance of douching or mitch steaming    Return  if symptoms worsen or fail to improve.    I spent 22 minutes caring for Ana on this date of service. This time includes time spent by me in the following activities: preparing for the visit, reviewing tests, obtaining and/or reviewing a separately obtained history, performing a medically appropriate examination and/or evaluation, counseling and educating the patient/family/caregiver, ordering medications, tests, or procedures, referring and communicating with other health care professionals, and documenting information in the medical record    Zenia Bennett, CHRIS  6/6/2023  18:22 EDT

## 2023-06-07 LAB
A VAGINAE DNA VAG QL NAA+PROBE: NORMAL SCORE
BVAB2 DNA VAG QL NAA+PROBE: NORMAL SCORE
C ALBICANS DNA VAG QL NAA+PROBE: NEGATIVE
C GLABRATA DNA VAG QL NAA+PROBE: NEGATIVE
MEGA1 DNA VAG QL NAA+PROBE: NORMAL SCORE

## 2023-06-14 LAB
M HOMINIS SPEC QL CULT: NEGATIVE
U UREALYTICUM SPEC QL CULT: NEGATIVE

## 2023-08-08 ENCOUNTER — OFFICE VISIT (OUTPATIENT)
Dept: OBSTETRICS AND GYNECOLOGY | Facility: CLINIC | Age: 23
End: 2023-08-08
Payer: COMMERCIAL

## 2023-08-08 VITALS
BODY MASS INDEX: 28.53 KG/M2 | DIASTOLIC BLOOD PRESSURE: 71 MMHG | HEIGHT: 63 IN | SYSTOLIC BLOOD PRESSURE: 105 MMHG | WEIGHT: 161 LBS

## 2023-08-08 DIAGNOSIS — Z30.46 NEXPLANON REMOVAL: Primary | ICD-10-CM

## 2023-08-08 RX ORDER — PRENATAL VIT/IRON FUM/FOLIC AC 27 MG-1 MG
1 TABLET ORAL DAILY
Qty: 30 EACH | Refills: 10 | Status: SHIPPED | OUTPATIENT
Start: 2023-08-08

## 2023-08-08 NOTE — PROGRESS NOTES
Procedure Note     Pre-Operative Diagnosis: 1. Desire for removal of Nexplanon device from left arm    Post-Operative Diagnosis: Same     Procedure: Nexplanon removal    Anesthetic: 3mL 1% plain Xylocaine     Estimated Blood Loss: Nil     Complications: no complications were noted     Counseling: Patient was seen and counseled.  Patient desires removal of the implant device because she desires pregnancy.  She was counseled on other contraceptive options.  She was counseled on prenatal vitamin use if no contraceptive option chosen and healthy timing/spacing of pregnancy. PNV sent to pharmacy, encouraged to wait 3 cycles before trying for pregnancy. The risks of removal were reviewed including bleeding, infection, damage to surrounding structures, and scarring.    Description of Procedure:   Patient was consented for procedure; questions were answered. The implant was identified in her left upper extremity . The area of the distal implant was cleaned with alcohol and injected with 3mL of 1% lidocaine.  The skin was prepped with betadine and incised approximately 0.5cm parallel to the plane of the implant.  The implant was identified and grasped with a hemostat.  The overlying capsule was dissected off with a hemostat, and the implant was removed in its entirety.  The skin was closed with steri strips.  The incision was covered with a pressure dressing.  Patient tolerated the procedure well.     Assessment  Diagnoses and all orders for this visit:    1. Nexplanon removal (Primary)    Other orders  -     Prenatal Vit-Fe Fumarate-FA (Prenatal/Folic Acid) tablet; Take 1 tablet by mouth Daily.  Dispense: 30 each; Refill: 10      Call with positive pregnancy test  PNV daily  Avoid tobacco, drugs, alcohol, and cat litter    CHRIS Stack   8/8/2023  15:10 EDT

## 2023-08-14 ENCOUNTER — TELEPHONE (OUTPATIENT)
Dept: OBSTETRICS AND GYNECOLOGY | Facility: CLINIC | Age: 23
End: 2023-08-14
Payer: COMMERCIAL

## 2023-08-14 NOTE — TELEPHONE ENCOUNTER
Spoke with Ana to let her know that CHRIS Wells said that abnormal bleeding is not uncommon after removal of the Nexplanon. You should go to ED if you are saturating a pad an hour for 2 hours. Thank you

## 2023-08-14 NOTE — TELEPHONE ENCOUNTER
Caller: Ana Enriquez    Relationship to patient: Self    Best call back number: [phone]    Patient is needing:     PT HAD NEXPLANON REMOVED ON 8/8/2023  PT STARTED BLEEDING ON THE 9TH  HEAVY AT TIMES, SHE IS GOING THROUGH A TAMPON EVERY 1 TO 2 HOURS AND IS HAVING SOME LARGE BLOOD CLOTS    PT WANTS TO KNOW IF SHE NEEDS TO BE SEEN OR IS THIS NORMAL    PLEASE ADVISE    OKAY TO CALL ANY TIME  OKAY TO [facility]

## 2023-08-14 NOTE — TELEPHONE ENCOUNTER
Abnormal bleeding is not uncommon after removal. She should go to ED if she is saturating a pad an hour for 2 hours in a row. Thank you

## 2023-10-23 ENCOUNTER — INITIAL PRENATAL (OUTPATIENT)
Dept: OBSTETRICS AND GYNECOLOGY | Facility: CLINIC | Age: 23
End: 2023-10-23
Payer: COMMERCIAL

## 2023-10-23 VITALS — SYSTOLIC BLOOD PRESSURE: 118 MMHG | BODY MASS INDEX: 28.38 KG/M2 | WEIGHT: 160.2 LBS | DIASTOLIC BLOOD PRESSURE: 72 MMHG

## 2023-10-23 DIAGNOSIS — Z34.91 INITIAL OBSTETRIC VISIT IN FIRST TRIMESTER: Primary | ICD-10-CM

## 2023-10-23 DIAGNOSIS — H53.419 VISUAL FIELD SCOTOMA, UNSPECIFIED LATERALITY: ICD-10-CM

## 2023-10-23 LAB
B-HCG UR QL: POSITIVE
EXPIRATION DATE: ABNORMAL
GLUCOSE UR STRIP-MCNC: NEGATIVE MG/DL
INTERNAL NEGATIVE CONTROL: ABNORMAL
INTERNAL POSITIVE CONTROL: ABNORMAL
Lab: ABNORMAL
PROT UR STRIP-MCNC: NEGATIVE MG/DL

## 2023-10-23 NOTE — PROGRESS NOTES
CC: Initial obstetric visit    HPI: 22-year-old  3 para 1 presents at 6-3/7 weeks by a certain last menstrual cycle for her initial obstetric visit.  Her history is significant for a vaginal delivery at 35 weeks due to severe preeclampsia.  Since then, she has continued to experience scotomata and headaches despite normal blood pressures.  Also, she has a history of an ectopic pregnancy which was treated with methotrexate in 2019.  The patient has nausea with occasional vomiting.  She denies abdominal or pelvic pain.  Denies vaginal bleeding.    Review of systems    This is positive for headaches.  Positive for scotomata.  Negative for pelvic pain.  Negative for vaginal bleeding.  All other systems are reviewed and are negative.    Assessment and plan    1.  Intrauterine pregnancy at 6-3/7 weeks  2.  History of ectopic pregnancy.  I recommend an ultrasound to confirm a viable intrauterine gestation and the patient agrees.  This has been ordered.  3.  Prenatal counseling was undertaken and questions were answered.  4.  Counseled regarding genetic screening.  The patient would like to pursue noninvasive prenatal screening when it is gestational age-appropriate.  5.  History of preeclampsia with previous pregnancy.  We discussed the benefits and risks of using baby aspirin.  The patient declines this for now.  6.  Counseled regarding ACOG recommendations for the influenza vaccine in pregnancy.  The patient declines this for now.

## 2023-10-24 LAB
ABO GROUP BLD: NORMAL
AMPHETAMINES UR QL SCN: NEGATIVE NG/ML
BARBITURATES UR QL SCN: NEGATIVE NG/ML
BASOPHILS # BLD AUTO: 0.1 X10E3/UL (ref 0–0.2)
BASOPHILS NFR BLD AUTO: 1 %
BENZODIAZ UR QL: NEGATIVE NG/ML
BLD GP AB SCN SERPL QL: NEGATIVE
BZE UR QL: NEGATIVE NG/ML
CANNABINOIDS UR QL SCN: NEGATIVE NG/ML
EOSINOPHIL # BLD AUTO: 0.1 X10E3/UL (ref 0–0.4)
EOSINOPHIL NFR BLD AUTO: 1 %
ERYTHROCYTE [DISTWIDTH] IN BLOOD BY AUTOMATED COUNT: 12.1 % (ref 11.7–15.4)
HBV SURFACE AG SERPL QL IA: NEGATIVE
HCT VFR BLD AUTO: 42.2 % (ref 34–46.6)
HCV IGG SERPL QL IA: NON REACTIVE
HGB BLD-MCNC: 14.6 G/DL (ref 11.1–15.9)
HIV 1+2 AB+HIV1 P24 AG SERPL QL IA: NON REACTIVE
IMM GRANULOCYTES # BLD AUTO: 0 X10E3/UL (ref 0–0.1)
IMM GRANULOCYTES NFR BLD AUTO: 0 %
LYMPHOCYTES # BLD AUTO: 1.7 X10E3/UL (ref 0.7–3.1)
LYMPHOCYTES NFR BLD AUTO: 25 %
MCH RBC QN AUTO: 31.4 PG (ref 26.6–33)
MCHC RBC AUTO-ENTMCNC: 34.6 G/DL (ref 31.5–35.7)
MCV RBC AUTO: 91 FL (ref 79–97)
METHADONE UR QL SCN: NEGATIVE NG/ML
MONOCYTES # BLD AUTO: 0.6 X10E3/UL (ref 0.1–0.9)
MONOCYTES NFR BLD AUTO: 9 %
NEUTROPHILS # BLD AUTO: 4.6 X10E3/UL (ref 1.4–7)
NEUTROPHILS NFR BLD AUTO: 64 %
OPIATES UR QL: NEGATIVE NG/ML
PCP UR QL SCN: NEGATIVE NG/ML
PLATELET # BLD AUTO: 240 X10E3/UL (ref 150–450)
PROPOXYPH UR QL SCN: NEGATIVE NG/ML
RBC # BLD AUTO: 4.65 X10E6/UL (ref 3.77–5.28)
RH BLD: POSITIVE
RPR SER QL: NON REACTIVE
RUBV IGG SERPL IA-ACNC: 2.05 INDEX
WBC # BLD AUTO: 7.1 X10E3/UL (ref 3.4–10.8)

## 2023-10-25 LAB
A VAGINAE DNA VAG QL NAA+PROBE: NORMAL SCORE
BACTERIA UR CULT: NORMAL
BACTERIA UR CULT: NORMAL
BVAB2 DNA VAG QL NAA+PROBE: NORMAL SCORE
C ALBICANS DNA VAG QL NAA+PROBE: NEGATIVE
C GLABRATA DNA VAG QL NAA+PROBE: NEGATIVE
C TRACH DNA VAG QL NAA+PROBE: NEGATIVE
MEGA1 DNA VAG QL NAA+PROBE: NORMAL SCORE
N GONORRHOEA DNA VAG QL NAA+PROBE: NEGATIVE
T VAGINALIS DNA VAG QL NAA+PROBE: NEGATIVE

## 2023-11-01 ENCOUNTER — TELEPHONE (OUTPATIENT)
Dept: OBSTETRICS AND GYNECOLOGY | Facility: CLINIC | Age: 23
End: 2023-11-01

## 2023-11-01 NOTE — TELEPHONE ENCOUNTER
Caller: Ana Enriquez    Relationship: Self    Best call back number: [phone]    Who is your current provider: DR. ANNA    Who would you like your new provider to be: ANY FEMALE PROVIDER FOR OB CARE    What are your reasons for transferring care: WOULD LIKE A FEMALE PROVIDER    Additional notes: PATIENT IS 7 WEEKS AND 5 DAYS HAS NEXT APPT ON 11/20/23. SHE WOULD LIKE TO TRANSFER PREGNANCY CARE TO ANY FEMALE PROVIDER.

## 2023-11-06 NOTE — TELEPHONE ENCOUNTER
Patient is requesting to see a female provider for the remainder of her pregnancy and after, would you like to take on her care?    Please advise, thanks!

## 2023-11-20 ENCOUNTER — OFFICE VISIT (OUTPATIENT)
Dept: NEUROLOGY | Facility: CLINIC | Age: 23
End: 2023-11-20
Payer: COMMERCIAL

## 2023-11-20 VITALS
HEART RATE: 91 BPM | SYSTOLIC BLOOD PRESSURE: 124 MMHG | DIASTOLIC BLOOD PRESSURE: 82 MMHG | OXYGEN SATURATION: 99 % | BODY MASS INDEX: 27.11 KG/M2 | HEIGHT: 63 IN | WEIGHT: 153 LBS

## 2023-11-20 DIAGNOSIS — G43.719 INTRACTABLE CHRONIC MIGRAINE WITHOUT AURA AND WITHOUT STATUS MIGRAINOSUS: Primary | ICD-10-CM

## 2023-11-20 PROCEDURE — 99214 OFFICE O/P EST MOD 30 MIN: CPT | Performed by: NURSE PRACTITIONER

## 2023-11-20 RX ORDER — MAGNESIUM OXIDE 400 MG/1
400 TABLET ORAL DAILY
Qty: 30 TABLET | Refills: 2 | Status: SHIPPED | OUTPATIENT
Start: 2023-11-20 | End: 2024-02-18

## 2023-11-21 ENCOUNTER — ROUTINE PRENATAL (OUTPATIENT)
Dept: OBSTETRICS AND GYNECOLOGY | Facility: CLINIC | Age: 23
End: 2023-11-21
Payer: COMMERCIAL

## 2023-11-21 ENCOUNTER — PATIENT ROUNDING (BHMG ONLY) (OUTPATIENT)
Dept: NEUROLOGY | Facility: CLINIC | Age: 23
End: 2023-11-21
Payer: COMMERCIAL

## 2023-11-21 VITALS — WEIGHT: 157 LBS | DIASTOLIC BLOOD PRESSURE: 73 MMHG | SYSTOLIC BLOOD PRESSURE: 116 MMHG | BODY MASS INDEX: 27.81 KG/M2

## 2023-11-21 DIAGNOSIS — Z13.79 GENETIC SCREENING: ICD-10-CM

## 2023-11-21 DIAGNOSIS — Z34.91 PRENATAL CARE, FIRST TRIMESTER: ICD-10-CM

## 2023-11-21 DIAGNOSIS — Z87.59 HISTORY OF ECTOPIC PREGNANCY: ICD-10-CM

## 2023-11-21 DIAGNOSIS — O09.299 HX OF PREECLAMPSIA, PRIOR PREGNANCY, CURRENTLY PREGNANT: ICD-10-CM

## 2023-11-21 DIAGNOSIS — Z3A.10 10 WEEKS GESTATION OF PREGNANCY: Primary | ICD-10-CM

## 2023-11-21 NOTE — PROGRESS NOTES
Chief Complaint   Patient presents with    Routine Prenatal Visit      Ana Enriquez is a 22 y.o.  at 10w4d who presents for routine prenatal visit. She reports doing mostly well but is having nausea and feeling very tired. Denies vomiting. She has history of ongoing headaches and scotomata since her last pregnancy complicated by severe preeclampsia despite normalization of blood pressures. She does report at home some episodes of dizziness and states that her blood pressure was 135/108 at that time. Denies vaginal bleeding or abdominal cramping. Too early for fetal movement.     /73   Wt 71.2 kg (157 lb)   LMP 2023   BMI 27.81 kg/m²    Gen: well appearing, NAD   Abd: gravid, nontender  See OB Flowsheet  Unable to obtain fetal heart tones with doppler. Bedside ultrasound performed that noted fetus with fetal cardiac activity.     ASSESSMENT:   IUP at 10w4d   Prenatal care in first trimester  History of ectopic pregnancy  History of preeclampsia in previous pregnancy   Genetic screening- aneuploidy testing     PLAN:  See updated Problem List   Reviewed expectations of this stage of pregnancy.   First trimester bleeding/pain precautions reviewed.  The patient is going to start baby aspirin given history of preeclampsia in previous pregnancy and plan to continue to 36 weeks of pregnancy.   I advised the patient that if her blood pressure at home is > 140/90 persistently then she should notify the office and we may need to consider an antihypertensive. She has remained normotensive while in the office and has no evidence of hypertension at this time.   Ordered [id number] testing for aneuploidy screening today,.   Return in about 4 weeks (around 2023) for Prenatal visit.    Deedee Valencia MD

## 2023-11-21 NOTE — PROGRESS NOTES
[facility] NEUROLOGY         Date of Visit: 2023    Name: Ana Enriquez    :  2000    PCP: Selena Rivera APRN    Visit Type: an initial evaluation         Subjective     Patient ID: Ana is a 22 y.o. female.         History of Present Illness  I have had the pleasure of seeing your patient the first time today.  As you may know she is a-year-old female here today for initial evaluation for visual disturbance headaches.  She is currently 10 weeks pregnant.  She was wearing her OB.    History:    Patient does have history of previous migraine headaches along with total disturbance and preeclampsia with had during her previous pregnancy 2 years ago.  She also has history of previous seizure-like episodes in her teenage years and went years a.    According to records I could find for [facility] patient was admitted to the hospital for work-up for seizure-like rhythm/possible pseudoseizure.  EEG monitoring at that time with no evidence for epileptiform abnormalities despite what appears to be to make movement of the body.  It does appear that patient was treated and broke as well as with other psychiatric help for several years after this.  She states that she is not sure if ever she requires any medication and does not remember following up regularly with neurology.  She has been off of these medications several years now without any additional sick episodes.  She has had previous imaging in the past including most recently a CT of the head done last year with no abnormalities.    Current:    Patient states that she is now 10 weeks pregnant.  Since her pregnancy she started balancing between headache symptoms as well as these visual disturbances which she describes as black spots in her visual field that can last anywhere from several seconds to a minute to 2 in duration and then resolved.  She states it happened 2 times in the last month.  She was experiencing cold distress like  previously when she had preeclampsia.  Blood pressure today is normal however he does report it is running in the 130s at home.  She does have follow-up appointment with OB this week.  Patient stated having mild headache symptoms.  She is not currently taking anything for headache.  Her OB/GYN did month shin possible aspirin therapy due to history of doing so in the past however patient was not wanting to pursue this at this time.  She denies strokelike symptoms, losses of consciousness, discussed seizure-like episodes.  No other new neurological complaint .  Headache      The following portions of the patient's history were reviewed and updated as appropriate: allergies, current medications, past family history, past medical history, past social history, past surgical history, and problem list.                 Review of Systems   Constitutional:  Negative for activity change, appetite change, fatigue and unexpected weight change.   HENT:  Negative for hearing loss, tinnitus and trouble swallowing.    Eyes:  Positive for visual disturbance. Negative for photophobia and pain.   Respiratory:  Negative for chest tightness and shortness of breath.    Cardiovascular:  Negative for palpitations.   Gastrointestinal:  Negative for nausea and vomiting.   Musculoskeletal:  Negative for neck pain.   Neurological:  Positive for headaches. Negative for dizziness, syncope, facial asymmetry, speech difficulty, weakness, light-headedness and numbness.   Psychiatric/Behavioral:  Negative for confusion and sleep disturbance.             Current Medications:    Current Outpatient Medications   Medication Instructions    Etonogestrel (Nexplanon) 68 MG implant subdermal implant 1 each, Once    magnesium oxide (MAG-OX) 400 mg, Oral, Daily    Prenatal Vit-Fe Fumarate-FA (Prenatal/Folic Acid) tablet 1 tablet, Oral, Daily    Probiotic Product (Probiotic Pearls Womens) capsule 1 capsule, Oral, Daily    Sodium Fluoride 5000 Sensitive 1.1-5 %  "gel No dose, route, or frequency recorded.    valACYclovir (VALTREX) 2,000 mg, Oral          /82   Pulse 91   Ht 160 cm (63\")   Wt 69.4 kg (153 lb)   SpO2 99%   BMI 27.10 kg/m²                Objective     Neurological Exam  Mental Status  Awake, alert and oriented to person, place and time. Speech is normal. Language is fluent with no aphasia.    Cranial Nerves  CN II: Visual fields full to confrontation.  CN III, IV, VI: Extraocular movements intact bilaterally. Normal lids and orbits bilaterally. Pupils equal round and reactive to light bilaterally.  CN V: Facial sensation is normal.  CN VII: Full and symmetric facial movement.  CN IX, X: Palate elevates symmetrically  CN XI: Shoulder shrug strength is normal.  CN XII: Tongue midline without atrophy or fasciculations.    Motor  Normal muscle bulk throughout. Normal muscle tone. No abnormal involuntary movements. Strength is 5/5 throughout all four extremities.    Sensory  Sensation is intact to light touch, pinprick, vibration and proprioception in all four extremities.    Reflexes  Deep tendon reflexes are 2+ and symmetric in all four extremities.    Coordination    Finger-to-nose, rapid alternating movements and heel-to-shin normal bilaterally without dysmetria.    Gait  Normal casual, toe, heel and tandem gait.      Physical Exam  Constitutional:       Appearance: Normal appearance. She is normal weight.   Eyes:      General: Lids are normal.      Extraocular Movements: Extraocular movements intact.      Pupils: Pupils are equal, round, and reactive to light.   Pulmonary:      Effort: Pulmonary effort is normal.   Skin:     General: Skin is warm.   Neurological:      Motor: Motor strength is normal.     Coordination: Coordination is intact.      Deep Tendon Reflexes: Reflexes are normal and symmetric.   Psychiatric:         Mood and Affect: Mood normal.         Speech: Speech normal.         Behavior: Behavior normal.                     Assessment " & Plan     Diagnoses and all orders for this visit:    1. Intractable chronic migraine without aura and without status migrainosus (Primary)  -     magnesium oxide (MAG-OX) 400 MG tablet; Take 1 tablet by mouth Daily for 90 days.  Dispense: 30 tablet; Refill: 2    At them I do feel that the symptoms of the aura for the patient.  We will go ahead start her on mag oxide 1 mg a week for headache preventative therapy.    I did tell her aspirin may not be harmful in taking with some of the symptoms she is having if this is ready for the hypertensive management with OB/GYN.    In addition we discussed that if she has worsening symptoms, seizure-like symptoms we may need to consider EEG in the future however currently we will go off on this.    Follow-up in 3 months or sooner if needed.              Deedee PEREZ    Neurology    [facility] [place]    Phone: [phone]    11/20/2023 , 19:04 EST

## 2023-11-27 LAB
CFDNA.FET/CFDNA.TOTAL SFR FETUS: NORMAL %
CITATION REF LAB TEST: NORMAL
FET 13+18+21+X+Y ANEUP PLAS.CFDNA: NEGATIVE
FET CHR 21 TS PLAS.CFDNA QL: NEGATIVE
FET SEX PLAS.CFDNA DOSAGE CFDNA: NORMAL
FET TS 13 RISK PLAS.CFDNA QL: NEGATIVE
FET TS 18 RISK WBC.DNA+CFDNA QL: NEGATIVE
GA EST FROM CONCEPTION DATE: NORMAL D
GESTATIONAL AGE > 9:: YES
LAB DIRECTOR NAME PROVIDER: NORMAL
LAB DIRECTOR NAME PROVIDER: NORMAL
LABORATORY COMMENT REPORT: NORMAL
LIMITATIONS OF THE TEST: NORMAL
NEGATIVE PREDICTIVE VALUE: NORMAL
NOTE: NORMAL
PERFORMANCE CHARACTERISTICS: NORMAL
POSITIVE PREDICTIVE VALUE: NORMAL
REF LAB TEST METHOD: NORMAL
TEST PERFORMANCE INFO SPEC: NORMAL

## 2023-12-14 ENCOUNTER — TELEPHONE (OUTPATIENT)
Dept: OBSTETRICS AND GYNECOLOGY | Facility: CLINIC | Age: 23
End: 2023-12-14
Payer: COMMERCIAL

## 2023-12-14 NOTE — TELEPHONE ENCOUNTER
Provider: DR PHILLIP    Caller: CAROLYN SANTOS    Phone Number: [phone]    Reason for Call: PATIENT WAS CALLING IN WITH CONCERNS BECAUSE HER LEGS ARE SWELLING//PATIENT STATED THAT SHE DOES TAKE REST BREAKS WERE SHE PROPS THEM UP AND ON AND OFF THROUGH OUT THE DAY SHE USES COMPRESSION SOCKS AND THAT DOESN'T SEEM TO BE HELPING//PLEASE FOLLOW UP       reduced overall effort and reduced BS bases bialterally

## 2023-12-19 ENCOUNTER — ROUTINE PRENATAL (OUTPATIENT)
Dept: OBSTETRICS AND GYNECOLOGY | Facility: CLINIC | Age: 23
End: 2023-12-19
Payer: COMMERCIAL

## 2023-12-19 VITALS — DIASTOLIC BLOOD PRESSURE: 64 MMHG | BODY MASS INDEX: 27.46 KG/M2 | WEIGHT: 155 LBS | SYSTOLIC BLOOD PRESSURE: 104 MMHG

## 2023-12-19 DIAGNOSIS — Z36.89 ENCOUNTER FOR FETAL ANATOMIC SURVEY: ICD-10-CM

## 2023-12-19 DIAGNOSIS — Z36.86 ENCOUNTER FOR ANTENATAL SCREENING FOR CERVICAL LENGTH: ICD-10-CM

## 2023-12-19 DIAGNOSIS — Z3A.14 14 WEEKS GESTATION OF PREGNANCY: Primary | ICD-10-CM

## 2023-12-19 DIAGNOSIS — Z34.92 PRENATAL CARE, SECOND TRIMESTER: ICD-10-CM

## 2023-12-19 DIAGNOSIS — O09.299 HX OF PREECLAMPSIA, PRIOR PREGNANCY, CURRENTLY PREGNANT: ICD-10-CM

## 2023-12-19 DIAGNOSIS — M79.89 SWELLING OF LOWER EXTREMITY: ICD-10-CM

## 2023-12-19 DIAGNOSIS — R63.0 DECREASED APPETITE: ICD-10-CM

## 2023-12-19 LAB
GLUCOSE UR STRIP-MCNC: NEGATIVE MG/DL
PROT UR STRIP-MCNC: NEGATIVE MG/DL

## 2023-12-19 NOTE — PROGRESS NOTES
Chief Complaint   Patient presents with    Routine Prenatal Visit      Ana Enriquez is a 22 y.o.  at 14w4d who presents of routine prenatal visit. She reports that she is doing mostly well. She did have 3 episodes of vaginal spotting with wiping a week or two ago that has since not occurred again. It was spontaneous and not proceeded by intercourse. She denied accompanying abdominal cramping but has had it sporadically. Denies LOF. Too early for fetal movement. She also reports lower extremity swelling on some occasions that looks like a bruise. Lastly, she notes decreased appetite.     /64   Wt 70.3 kg (155 lb)   LMP 2023   BMI 27.46 kg/m²    Gen: well appearing, NAD   Abd: gravid, nontender  See OB Flowsheet  Fetal cardiac activity visualized with bedside ultrasound     ASSESSMENT:   IUP at 14w4d   Prenatal care in second trimester  History of ectopic pregnancy  History of preeclampsia in previous pregnancy   Lower extremity edema   Decreased appetite  Vaginal spotting- resolved    PLAN:  Problem list reviewed and updated.   Reviewed expectations of this stage of pregnancy.   Second trimester precautions reviewed including  labor precautions, anticipated fetal movements.   Will obtain fetal anatomy survey and cervical length screening at next visit.  Reviewed her weight that has decreased 2 lb since last visit but the patient is still tolerating PO. We discussed use of protein shakes and small frequent meals.   No further vaginal spotting and has positive blood type so no further workup indicated at this time.   I reviewed that lower extremity swelling may occur throughout pregnancy and advised rest, elevating feet at rest, and compression hoses.   Offered Flu vaccine and declines today.   Return in about 5 weeks (around 2024) for Prenatal visit, Anatomy Survey.    Patient Active Problem List    Diagnosis Date Noted    Normal labor 2021    Severe pre-eclampsia, third  trimester 2021     (normal spontaneous vaginal delivery) 2021    Swelling 2021    Pre-syncope 2021    Shortness of breath 10/07/2021    Right upper quadrant pain 10/07/2021    Chest pain 10/07/2021    Dizziness 10/07/2021    Positive urine drug screen:+THC. 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    Polyphagia 2018    High risk medication use 2018    Headache 2016    Anxiety 2016    Abnormal finding on thyroid function test 2016    Bipolar I disorder, single manic episode 2016    New onset seizure 2016    Panic disorder without agoraphobia 2016       Orders Placed This Encounter   Procedures    US Ob 14 + Weeks Single or First Gestation     Standing Status:   Future     Standing Expiration Date:   2024     Order Specific Question:   Reason for Exam:     Answer:   fetal anatomy survey     Order Specific Question:   Release to patient     Answer:   Routine Release [[id number]]    US Ob Transvaginal     Standing Status:   Future     Standing Expiration Date:   2024     Order Specific Question:   Reason for Exam:     Answer:   cervical length screening     Order Specific Question:   Release to patient     Answer:   Routine Release [[id number]]    POC Urinalysis Dipstick     Order Specific Question:   Release to patient     Answer:   Routine Release [[id number]]     Deedee Valencia MD

## 2023-12-21 ENCOUNTER — TELEPHONE (OUTPATIENT)
Dept: OBSTETRICS AND GYNECOLOGY | Facility: CLINIC | Age: 23
End: 2023-12-21
Payer: COMMERCIAL

## 2023-12-21 NOTE — TELEPHONE ENCOUNTER
Provider: DR PHILLIP     Caller: CAROLYN SANTOS    Relationship to Patient: SELF         Reason for Call:PT IS ALREADY PRESCRIBED VALTREX FOR FEVER BLISTERS BUT NOW SHE'S PREGNANT - PT WOULD LIKE TO KNOW IF IT IS SAFE TO TAKE DURING PREGNANCY - PT HAS HAD A LOT OF FEVER BLISTERS LATELY PLEASE CALL

## 2023-12-21 NOTE — TELEPHONE ENCOUNTER
14w 6d ob pt called to ask if it is safe to take Valtrex during pregnancy.  Has been getting a lot of fever blisters lately.    Please advise.     Thanks,   Georgie    Pt # [phone]

## 2024-01-22 ENCOUNTER — ROUTINE PRENATAL (OUTPATIENT)
Dept: OBSTETRICS AND GYNECOLOGY | Facility: CLINIC | Age: 24
End: 2024-01-22
Payer: COMMERCIAL

## 2024-01-22 VITALS — DIASTOLIC BLOOD PRESSURE: 60 MMHG | WEIGHT: 158 LBS | BODY MASS INDEX: 27.99 KG/M2 | SYSTOLIC BLOOD PRESSURE: 106 MMHG

## 2024-01-22 DIAGNOSIS — N64.4 NIPPLE PAIN: ICD-10-CM

## 2024-01-22 DIAGNOSIS — Z34.92 PRENATAL CARE, SECOND TRIMESTER: ICD-10-CM

## 2024-01-22 DIAGNOSIS — O09.899 HISTORY OF PRETERM DELIVERY, CURRENTLY PREGNANT: ICD-10-CM

## 2024-01-22 DIAGNOSIS — Z36.89 ENCOUNTER FOR FETAL ANATOMIC SURVEY: ICD-10-CM

## 2024-01-22 DIAGNOSIS — Z3A.19 19 WEEKS GESTATION OF PREGNANCY: Primary | ICD-10-CM

## 2024-01-22 DIAGNOSIS — O26.899 HEADACHE IN PREGNANCY, ANTEPARTUM: ICD-10-CM

## 2024-01-22 DIAGNOSIS — Z36.86 ENCOUNTER FOR ANTENATAL SCREENING FOR CERVICAL LENGTH: ICD-10-CM

## 2024-01-22 DIAGNOSIS — R51.9 HEADACHE IN PREGNANCY, ANTEPARTUM: ICD-10-CM

## 2024-01-22 DIAGNOSIS — O09.299 HX OF PREECLAMPSIA, PRIOR PREGNANCY, CURRENTLY PREGNANT: ICD-10-CM

## 2024-01-22 LAB
GLUCOSE UR STRIP-MCNC: NEGATIVE MG/DL
PROT UR STRIP-MCNC: NEGATIVE MG/DL

## 2024-01-22 NOTE — PROGRESS NOTES
Chief Complaint   Patient presents with    Routine Prenatal Visit      Ana Enriquez is a 23 y.o.  at 19w3d who presents of routine prenatal visit. She reports several complaints including right sided headaches that feels like pressure. She is taking ASA 81 mg daily. She has tried tylenol without much relief. She  follows up with neurology next month.   She also reports back pain on one side that goes back and forth that is helped by the bath.   She also reports right nipple pain that is sharp and feels that the nipple is swollen.   Denies vaginal bleeding, cramping, contractions, LOF. She reports feeling active fetal movement.     /60   Wt 71.7 kg (158 lb)   LMP 2023   BMI 27.99 kg/m²    Gen: well appearing, NAD   Abd: gravid, nontender  Breasts: normal appearing breasts without injury to either nipple noted. Right nipple is mildly painful to the touch.   See OB Flowsheet    ASSESSMENT:   IUP at 19w3d   Prenatal care in second trimester  History of preeclampsia in previous pregnancy  History of PPROM at 35 weeks with  delivery in subsequent pregnancy   Headaches affecting pregnancy   Nipple pain   Encounter for fetal anatomy survey and cervical length screening     PLAN:  Problem list reviewed and updated.   Reviewed expectations of this stage of pregnancy.   Second trimester precautions reviewed including  labor precautions, anticipated fetal movements.   Patient advised to use Tylenol PRN, magnesium oxide 400 mg daily to management of headaches. She is scheduled to see neurology on 24.   I discussed that her exam shows no obvious injury or cause of pain from her nipple and it is likely hormonal. Advised to wear tight, compressing bras to see if this would help.   Discussed ultrasound results that demonstrated normal fetal anatomy survey, normal cervical length screening measuring 4.17 cm, breech presentation, anterior placenta above os, MVP 4.55 cm, and fetus measuring  301 g (19w3d.)  Return in about 29 days (around 2024) for Prenatal visit.    Patient Active Problem List    Diagnosis Date Noted    Normal labor 2021    Severe pre-eclampsia, third trimester 2021     (normal spontaneous vaginal delivery) 2021    Swelling 2021    Pre-syncope 2021    Shortness of breath 10/07/2021    Right upper quadrant pain 10/07/2021    Chest pain 10/07/2021    Dizziness 10/07/2021    Positive urine drug screen:+THC. 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

## 2024-01-29 ENCOUNTER — TELEPHONE (OUTPATIENT)
Dept: OBSTETRICS AND GYNECOLOGY | Facility: CLINIC | Age: 24
End: 2024-01-29
Payer: COMMERCIAL

## 2024-01-29 NOTE — TELEPHONE ENCOUNTER
Please let her know that I suspect it was how she was sleeping that lead to nerve compression in her face and caused her symptoms. If it is still going on throughout the day, then I would go to the ER especially if she is feeling weakness too. Thanks!     Deedee Valencia MD

## 2024-01-29 NOTE — TELEPHONE ENCOUNTER
----- Message from Ana Enriquez sent at 1/27/2024  2:01 PM EST -----  Regarding: Face going numb/ Pens and needles  Contact: [phone]  Hello! I woke up this morning to my left side of my face being numb like pens and needles stabbing me. -I just woke up so I’m not sure if there was anything else with it, I felt kind of weak like I didn’t want to move but again I just woke up. I felt nauseous when I finally got up but I’m 20 weeks pregnant so not sure.  No headache, I’m not sure about blurry vision bc it’s always burly when I wake up and it was today. I’m not if this means anything I just wanted to let you know this happened.

## 2024-02-20 ENCOUNTER — LAB (OUTPATIENT)
Dept: LAB | Facility: HOSPITAL | Age: 24
End: 2024-02-20
Payer: COMMERCIAL

## 2024-02-20 ENCOUNTER — REFERRAL TRIAGE (OUTPATIENT)
Dept: LABOR AND DELIVERY | Facility: HOSPITAL | Age: 24
End: 2024-02-20
Payer: COMMERCIAL

## 2024-02-20 ENCOUNTER — OFFICE VISIT (OUTPATIENT)
Dept: NEUROLOGY | Facility: CLINIC | Age: 24
End: 2024-02-20
Payer: COMMERCIAL

## 2024-02-20 VITALS
SYSTOLIC BLOOD PRESSURE: 120 MMHG | DIASTOLIC BLOOD PRESSURE: 80 MMHG | HEART RATE: 90 BPM | BODY MASS INDEX: 28 KG/M2 | HEIGHT: 63 IN | OXYGEN SATURATION: 98 % | WEIGHT: 158 LBS

## 2024-02-20 DIAGNOSIS — R20.0 NUMBNESS AND TINGLING: ICD-10-CM

## 2024-02-20 DIAGNOSIS — G43.719 INTRACTABLE CHRONIC MIGRAINE WITHOUT AURA AND WITHOUT STATUS MIGRAINOSUS: Primary | ICD-10-CM

## 2024-02-20 DIAGNOSIS — R20.2 NUMBNESS AND TINGLING: ICD-10-CM

## 2024-02-20 LAB
BASOPHILS # BLD AUTO: 0.04 10*3/MM3 (ref 0–0.2)
BASOPHILS NFR BLD AUTO: 0.3 % (ref 0–1.5)
DEPRECATED RDW RBC AUTO: 44.9 FL (ref 37–54)
EOSINOPHIL # BLD AUTO: 0.07 10*3/MM3 (ref 0–0.4)
EOSINOPHIL NFR BLD AUTO: 0.6 % (ref 0.3–6.2)
ERYTHROCYTE [DISTWIDTH] IN BLOOD BY AUTOMATED COUNT: 13.1 % (ref 12.3–15.4)
HCT VFR BLD AUTO: 34.8 % (ref 34–46.6)
HGB BLD-MCNC: 11.6 G/DL (ref 12–15.9)
IMM GRANULOCYTES # BLD AUTO: 0.11 10*3/MM3 (ref 0–0.05)
IMM GRANULOCYTES NFR BLD AUTO: 0.9 % (ref 0–0.5)
IRON 24H UR-MRATE: 49 MCG/DL (ref 37–145)
IRON SATN MFR SERPL: 13 % (ref 20–50)
LYMPHOCYTES # BLD AUTO: 1.81 10*3/MM3 (ref 0.7–3.1)
LYMPHOCYTES NFR BLD AUTO: 14.9 % (ref 19.6–45.3)
MCH RBC QN AUTO: 31.4 PG (ref 26.6–33)
MCHC RBC AUTO-ENTMCNC: 33.3 G/DL (ref 31.5–35.7)
MCV RBC AUTO: 94.1 FL (ref 79–97)
MONOCYTES # BLD AUTO: 0.8 10*3/MM3 (ref 0.1–0.9)
MONOCYTES NFR BLD AUTO: 6.6 % (ref 5–12)
NEUTROPHILS NFR BLD AUTO: 76.7 % (ref 42.7–76)
NEUTROPHILS NFR BLD AUTO: 9.34 10*3/MM3 (ref 1.7–7)
NRBC BLD AUTO-RTO: 0 /100 WBC (ref 0–0.2)
PLATELET # BLD AUTO: 226 10*3/MM3 (ref 140–450)
PMV BLD AUTO: 11 FL (ref 6–12)
RBC # BLD AUTO: 3.7 10*6/MM3 (ref 3.77–5.28)
TIBC SERPL-MCNC: 389 MCG/DL (ref 298–536)
TRANSFERRIN SERPL-MCNC: 261 MG/DL (ref 200–360)
VIT B12 BLD-MCNC: 245 PG/ML (ref 211–946)
WBC NRBC COR # BLD AUTO: 12.17 10*3/MM3 (ref 3.4–10.8)

## 2024-02-20 PROCEDURE — 99214 OFFICE O/P EST MOD 30 MIN: CPT | Performed by: NURSE PRACTITIONER

## 2024-02-20 PROCEDURE — 1159F MED LIST DOCD IN RCRD: CPT | Performed by: NURSE PRACTITIONER

## 2024-02-20 PROCEDURE — 1160F RVW MEDS BY RX/DR IN RCRD: CPT | Performed by: NURSE PRACTITIONER

## 2024-02-20 PROCEDURE — 84466 ASSAY OF TRANSFERRIN: CPT

## 2024-02-20 PROCEDURE — 85025 COMPLETE CBC W/AUTO DIFF WBC: CPT

## 2024-02-20 PROCEDURE — 83540 ASSAY OF IRON: CPT

## 2024-02-20 PROCEDURE — 36415 COLL VENOUS BLD VENIPUNCTURE: CPT

## 2024-02-20 PROCEDURE — 82607 VITAMIN B-12: CPT | Performed by: NURSE PRACTITIONER

## 2024-02-20 RX ORDER — MAGNESIUM OXIDE 400 MG/1
400 TABLET ORAL DAILY
Qty: 90 TABLET | Refills: 1 | Status: SHIPPED | OUTPATIENT
Start: 2024-02-20 | End: 2024-08-18

## 2024-02-21 ENCOUNTER — ROUTINE PRENATAL (OUTPATIENT)
Dept: OBSTETRICS AND GYNECOLOGY | Facility: CLINIC | Age: 24
End: 2024-02-21
Payer: COMMERCIAL

## 2024-02-21 ENCOUNTER — TELEPHONE (OUTPATIENT)
Dept: NEUROLOGY | Facility: CLINIC | Age: 24
End: 2024-02-21
Payer: COMMERCIAL

## 2024-02-21 VITALS — DIASTOLIC BLOOD PRESSURE: 64 MMHG | BODY MASS INDEX: 28.99 KG/M2 | SYSTOLIC BLOOD PRESSURE: 108 MMHG | WEIGHT: 163.6 LBS

## 2024-02-21 DIAGNOSIS — O09.299 HX OF PREECLAMPSIA, PRIOR PREGNANCY, CURRENTLY PREGNANT: ICD-10-CM

## 2024-02-21 DIAGNOSIS — Z34.92 PRENATAL CARE, SECOND TRIMESTER: ICD-10-CM

## 2024-02-21 DIAGNOSIS — O26.899 HEADACHE IN PREGNANCY, ANTEPARTUM: ICD-10-CM

## 2024-02-21 DIAGNOSIS — Z3A.23 23 WEEKS GESTATION OF PREGNANCY: Primary | ICD-10-CM

## 2024-02-21 DIAGNOSIS — R51.9 HEADACHE IN PREGNANCY, ANTEPARTUM: ICD-10-CM

## 2024-02-21 DIAGNOSIS — O09.899 HISTORY OF PRETERM DELIVERY, CURRENTLY PREGNANT: ICD-10-CM

## 2024-02-21 LAB
GLUCOSE UR STRIP-MCNC: NEGATIVE MG/DL
PROT UR STRIP-MCNC: NEGATIVE MG/DL

## 2024-02-21 NOTE — PROGRESS NOTES
[facility] NEUROLOGY         Date of Visit: 2024    Name: Ana Enriquez    :  2000    PCP: Selena Rivera APRN    Visit Type: an initial evaluation         Subjective     Patient ID: Ana is a 23 y.o. female.         History of Present Illness  I have had the pleasure of seeing your patient today.  As you may know she is a 23-year-old female here today for ifollow up for visual disturbance headaches.  She is currently 20 weeks pregnant.  She was referred from her OBGYN.    History:    Patient does have history of previous migraine headaches along with visual disturbance and preeclampsia which she had during her previous pregnancy 2 years ago.  She also has history of previous seizure-like episodes in her teenage years.    According to records I could find for her patient was admitted to the hospital for work-up for seizure-like rhythm/possible pseudoseizure.  EEG monitoring at that time with no evidence for epileptiform abnormalities despite what appears to be rhythmic movement of the body.  It does appear that patient was referred for pseudoseizure as well as with other psychiatric help for several years after this.  She states that she is not sure if ever she required any medication and does not remember following up regularly with neurology.  She has been off of these medications several years now without any additional seizure episodes.  She has had previous imaging in the past including most recently a CT of the head done last year with no abnormalities.    Current:    Patient presented to our office initially at 10 weeks pregnant with complaints of visual disturbance and headache.  We did end up placing patient on magnesium for headache prophylaxis and recommended follow-up in 3 months to continue to monitor symptoms as it was felt the patient was experiencing migraine with aura.  Patient states that she did take the magnesium however at about 14 weeks pregnant had to  discontinue the medication due to severe nausea and the fact that she was losing weight.  She is now in her second trimester currently approximately 20 weeks with normal fetal anatomy scan, good blood pressure, normal lab work.  She continues to have significant issues with headaches.  She recently also had an issue with facial numbness and had some leftover pain in her neck since this episode.  She states that headaches are still occurring several times per week and the visual disturbances are also occurring several times weekly.  Visual disturbances last anywhere from 15 to 45 minutes.  She denies any other new neurological complaints at today's visit.      The following portions of the patient's history were reviewed and updated as appropriate: allergies, current medications, past family history, past medical history, past social history, past surgical history, and problem list.                 Review of Systems   Constitutional:  Negative for activity change, appetite change, fatigue and unexpected weight change.   HENT:  Negative for hearing loss, tinnitus and trouble swallowing.    Eyes:  Positive for visual disturbance. Negative for photophobia and pain.   Respiratory:  Negative for chest tightness and shortness of breath.    Cardiovascular:  Negative for palpitations.   Gastrointestinal:  Negative for nausea and vomiting.   Musculoskeletal:  Negative for neck pain.   Neurological:  Positive for numbness. Negative for dizziness, syncope, facial asymmetry, speech difficulty, weakness and light-headedness.   Psychiatric/Behavioral:  Negative for confusion and sleep disturbance.             Current Medications:    Current Outpatient Medications   Medication Instructions    magnesium oxide (MAG-OX) 400 mg, Oral, Daily    Prenatal Vit-Fe Fumarate-FA (Prenatal/Folic Acid) tablet 1 tablet, Oral, Daily    Probiotic Product (Probiotic Pearls Womens) capsule 1 capsule, Oral, Daily    Sodium Fluoride 5000 Sensitive  "1.1-5 % gel No dose, route, or frequency recorded.    valACYclovir (VALTREX) 2,000 mg, Oral          /80   Pulse 90   Ht 160 cm (62.99\")   Wt 71.7 kg (158 lb)   SpO2 98%   BMI 28.00 kg/m²                Objective     Neurological Exam  Mental Status  Awake, alert and oriented to person, place and time. Speech is normal. Language is fluent with no aphasia.    Cranial Nerves  CN II: Visual fields full to confrontation.  CN III, IV, VI: Extraocular movements intact bilaterally. Normal lids and orbits bilaterally. Pupils equal round and reactive to light bilaterally.  CN V: Facial sensation is normal.  CN VII: Full and symmetric facial movement.  CN IX, X: Palate elevates symmetrically  CN XI: Shoulder shrug strength is normal.  CN XII: Tongue midline without atrophy or fasciculations.    Motor  Normal muscle bulk throughout. Normal muscle tone. No abnormal involuntary movements. Strength is 5/5 throughout all four extremities.    Sensory  Sensation is intact to light touch, pinprick, vibration and proprioception in all four extremities.    Reflexes  Deep tendon reflexes are 2+ and symmetric in all four extremities.    Coordination    Finger-to-nose, rapid alternating movements and heel-to-shin normal bilaterally without dysmetria.    Gait  Normal casual, toe, heel and tandem gait.      Physical Exam  Constitutional:       Appearance: Normal appearance. She is normal weight.   Eyes:      General: Lids are normal.      Extraocular Movements: Extraocular movements intact.      Pupils: Pupils are equal, round, and reactive to light.   Pulmonary:      Effort: Pulmonary effort is normal.   Skin:     General: Skin is warm.   Neurological:      Motor: Motor strength is normal.     Coordination: Coordination is intact.      Deep Tendon Reflexes: Reflexes are normal and symmetric.   Psychiatric:         Mood and Affect: Mood normal.         Speech: Speech normal.         Behavior: Behavior normal.                 "     Assessment & Plan     Diagnoses and all orders for this visit:    1. Intractable chronic migraine without aura and without status migrainosus (Primary)  -     magnesium oxide (MAG-OX) 400 MG tablet; Take 1 tablet by mouth Daily for 180 days.  Dispense: 90 tablet; Refill: 1    2. Numbness and tingling  -     CBC & Differential; Future  -     Vitamin B12  -     Iron Profile; Future    At this time I did recommend patient try taking magnesium again.  She was previously taking this at bedtime.  I recommended trying morning time with food.  If she cannot tolerate this we may consider riboflavin or B2 if it is cleared with her OB/GYN.    In addition we will go ahead and check a vitamin B12 level, iron panel, and CBC today to look for causes of patient's paresthesia symptoms.    Plan for follow-up in 3 months.  Patient will call to update me in 1 to 2 weeks on how she is doing on the magnesium and we will proceed with additional treatment plans from there.            Deedee PEREZ    Neurology    [facility] [place]    Phone: [phone]    2/20/2024 , 21:13 EST

## 2024-02-21 NOTE — PROGRESS NOTES
Chief Complaint   Patient presents with    Routine Prenatal Visit     Patient is here today for a routine prenatal visit. Neuro recommends b12, wants to make sure with Dr Valencia that it is ok to start.       Ana Enriquez is a 23 y.o.  at 23w5d who presents of routine prenatal visit. She reports doing well today. She reports that her neurologist would like to see if she may take Vitamin B12 during pregnancy.  Denies vaginal bleeding, cramping, contractions, LOF. She reports feeling active fetal movement.     /64   Wt 74.2 kg (163 lb 9.6 oz)   LMP 2023   BMI 28.99 kg/m²    Gen: well appearing, NAD   Abd: gravid, nontender  See OB Flowsheet    ASSESSMENT:   IUP at 23w5d   Prenatal care in second trimester  History of preeclampsia in previous pregnancy  History of PPROM at 35 weeks with  delivery in subsequent pregnancy   Headaches affecting pregnancy - recommended magnesium supplementation per Neurology     PLAN:  Problem list reviewed and updated.   Reviewed expectations of this stage of pregnancy.   Second trimester precautions reviewed including  labor precautions, anticipated fetal movements.   Will obtain CBC, 1h GTT, T. Pallidum antibody at next visit.   I discussed that she can receive Vitamin B 12 injections during pregnancy but the patient would rather not at this time.   Return in about 4 weeks (around 3/20/2024) for 1 h GTT, Prenatal visit.    Patient Active Problem List    Diagnosis Date Noted    Normal labor 2021    Severe pre-eclampsia, third trimester 2021     (normal spontaneous vaginal delivery) 2021    Swelling 2021    Pre-syncope 2021    Shortness of breath 10/07/2021    Right upper quadrant pain 10/07/2021    Chest pain 10/07/2021    Dizziness 10/07/2021    Positive urine drug screen:+THC. 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    Polyphagia 04/18/2018    High risk medication use 01/05/2018    Headache 09/16/2016    Anxiety 04/05/2016    Abnormal finding on thyroid function test 01/31/2016    Bipolar I disorder, single manic episode 01/31/2016    New onset seizure 01/31/2016    Panic disorder without agoraphobia 01/31/2016       Orders Placed This Encounter   Procedures    CBC (No Diff)     Order Specific Question:   Release to patient     Answer:   Routine Release [[id number]]    Gestational Screen 1 Hr ([facility])     Order Specific Question:   Release to patient     Answer:   Routine Release [[id number]]    T Pallidum Antibody w/ reflex RPR     Order Specific Question:   Release to patient     Answer:   Routine Release [[id number]]    POC Urinalysis Dipstick     Order Specific Question:   Release to patient     Answer:   Routine Release [[id number]]

## 2024-02-21 NOTE — TELEPHONE ENCOUNTER
spoke with patient she stated she would notify us of questions or concerns, as she will discuss with her OB today. I best

## 2024-02-21 NOTE — TELEPHONE ENCOUNTER
----- Message from CHRIS Nava sent at 2/20/2024  4:14 PM EST -----  Low B12 level. Recommend once weekly B12 injections x 4 weeks followed by biweekly injections x 4 months with repeat lab draw 1 month from last injection. Can we call her and find out if she would like to do these in office or at home?

## 2024-02-23 ENCOUNTER — PATIENT OUTREACH (OUTPATIENT)
Dept: LABOR AND DELIVERY | Facility: HOSPITAL | Age: 24
End: 2024-02-23
Payer: COMMERCIAL

## 2024-02-23 NOTE — OUTREACH NOTE
[facility]  Enrollment    Current Estimated Gestational Age: 24w0d    Questions/Answers      Flowsheet Row Responses   Would like to participate? Yes   Date of Intake Visit 02/23/24        [facility]  Intake    Current Estimated Gestational Age: 24w0d    Intake Assessment      Flowsheet Row Responses   Best Method for Contacting Cell   Currently Employed No   Able to keep appointments as scheduled Yes   Gender(s) and Name(s) girl   Do you have a dentist? Yes   Dentist Name office in [place]   Have you seen a dentist in the last 6 months Yes   Resources Presently Utilizing: WIC (Women, Infant, Children)   Maternal Warning Signs Provided   Other Education HANDS, Transportation Assistance, How to find a pediatrician, Insurance benefits/Incentives, WIC Benefits            Learning Assessment      Flowsheet Row Responses   Relationship Patient   Learner Name Ana   Does the learner have any barriers to learning? No Barriers   What is the preferred language of the learner for medical teaching? English   Is an  required? No   How does the learner prefer to learn new concepts? Listening, Reading, Demonstration, Pictures/Video          [facility]  Check-In    Current Estimated Gestational Age: 24w0d      Questions/Answers      Flowsheet Row Responses   Best Method for Contacting Cell   Demographics Reviewed Yes   Currently Employed No   Able to keep appointments as scheduled Yes   Gender(s) and Name(s) girl   Baby Active/Feeling Fetal Movemen Yes   How are you presently feeling? good   May I ask you questions about your substance use? Yes   Other Comment denies   Supplies ready for baby Car Seat, Clothing, Crib, Diapers   Resource/Environmental Concerns None   Do you have any questions related to your care experience, your pregnancy, plans for delivery, any concerns, etc? No   Other Education HANDS, Transportation Assistance, How to find a pediatrician, Insurance  benefits/Incentives, WIC Benefits          Intake completed today. She delivered at [facility] last time and is aware of location of L&D. She lives in an apartment with FOB and daughter and reports stable housing and reliable transportation. Reviewed maternal warning signs, especially related to BP. She is planning to buy a BP cuff to monitor at home. She has all necessary infant items and is active with [facility]. She reports active fetal movement daily. F/u in one month.     Soco Damon RN  Maternity Nurse Navigator    2/23/2024, 10:51 EST

## 2024-03-04 ENCOUNTER — PATIENT MESSAGE (OUTPATIENT)
Dept: OBSTETRICS AND GYNECOLOGY | Facility: CLINIC | Age: 24
End: 2024-03-04
Payer: COMMERCIAL

## 2024-03-05 DIAGNOSIS — O26.892 PELVIC PAIN AFFECTING PREGNANCY IN SECOND TRIMESTER, ANTEPARTUM: Primary | ICD-10-CM

## 2024-03-05 DIAGNOSIS — R10.2 PELVIC PAIN AFFECTING PREGNANCY IN SECOND TRIMESTER, ANTEPARTUM: Primary | ICD-10-CM

## 2024-03-05 NOTE — TELEPHONE ENCOUNTER
Loyda message sent: Dr. Valencia has put in a referral for PT. They generally will call you directly.  If you have not heard anything from them or our office, please call and ask for Georgie.

## 2024-03-08 ENCOUNTER — HOSPITAL ENCOUNTER (OUTPATIENT)
Dept: PHYSICAL THERAPY | Facility: HOSPITAL | Age: 24
Setting detail: THERAPIES SERIES
Discharge: HOME | End: 2024-03-08
Payer: COMMERCIAL

## 2024-03-08 DIAGNOSIS — R39.15 URINARY URGENCY: ICD-10-CM

## 2024-03-08 DIAGNOSIS — M54.50 PREGNANCY RELATED LOW BACK PAIN, ANTEPARTUM: Primary | ICD-10-CM

## 2024-03-08 DIAGNOSIS — M54.50 RIGHT-SIDED LOW BACK PAIN WITHOUT SCIATICA, UNSPECIFIED CHRONICITY: ICD-10-CM

## 2024-03-08 DIAGNOSIS — O26.899 PREGNANCY RELATED LOW BACK PAIN, ANTEPARTUM: Primary | ICD-10-CM

## 2024-03-08 DIAGNOSIS — R35.0 URINARY FREQUENCY: ICD-10-CM

## 2024-03-08 PROCEDURE — 97530 THERAPEUTIC ACTIVITIES: CPT

## 2024-03-08 PROCEDURE — 97161 PT EVAL LOW COMPLEX 20 MIN: CPT

## 2024-03-08 NOTE — THERAPY EVALUATION
Outpatient Physical Therapy Ortho Initial Evaluation  [facility]     Patient Name: Ana Enriquez  : 2000  MRN: [id number]  Today's Date: 3/8/2024      Visit Date: 2024    Patient Active Problem List   Diagnosis    Abnormal finding on thyroid function test    Bipolar I disorder, single manic episode    New onset seizure    Panic disorder without agoraphobia    Anxiety    Headache    High risk medication use    Polyphagia    Herpes labialis    Self mutilating behavior    h/o ectopic pregnancy    Nausea and vomiting during pregnancy prior to 22 weeks gestation    Positive urine drug screen:+THC. Needs =    Shortness of breath    Right upper quadrant pain    Chest pain    Dizziness    Pre-syncope    Swelling    Severe pre-eclampsia, third trimester     (normal spontaneous vaginal delivery)    Normal labor        Past Medical History:   Diagnosis Date    Cannabis abuse     Ectopic pregnancy     Seizure in childhood     Self-mutilation     cutting        Past Surgical History:   Procedure Laterality Date    WISDOM TOOTH EXTRACTION         Visit Dx:     ICD-10-CM ICD-9-CM   1. Pregnancy related low back pain, antepartum  O26.899 646.83    M54.50 724.2   2. Right-sided low back pain without sciatica, unspecified chronicity  M54.50 724.2   3. Urinary urgency  R39.15 788.63   4. Urinary frequency  R35.0 788.41          Patient History       Row Name 24 1500             Fall Risk Assessment    Any falls in the past year: No  -RS         Services    Are you currently receiving Home Health services No  -RS         Daily Activities    Primary Language English  -RS      Are you able to read No  -RS      Are you able to write No  -RS      How does patient learn best? Listening;Reading;Demonstration  -RS      Pt Participated in POC and Goals Yes  -RS         Safety    Are you being hurt, hit, or frightened by anyone at home or in your life? No  -RS      Are you being neglected by a caregiver No   -RS                User Key  (r) = Recorded By, (t) = Taken By, (c) = Cosigned By      Initials Name Provider Type    RS Abby Alvarado, PT Physical Therapist                                 Pelvic Health       Row Name 03/08/24 1500             Subjective    Patient Reason for Visit Back Pain;Prenatal Care  -RS      Brief Description of Chief Complaint The pt is a 24 yo female who presents with R sided low back pain. She is currently 26 weeks pregnant and the pain began about 2 months ago but has worsened in the past 2-3 weeks. She takes tylenol, uses heat (helps temporarily), massage from her boyfriend, yoga, and walking. She feels like the walking  has helped the most.  She experiences pain on the right side of her R low back that does not travel down her leg. She has given birth 1 other time (2 years prior) vaginally with no tearing. She denies stress incontinence but does report urinary urgency, peeing 3 times per hour and 7 times per night. She feels pelvic pressure but denies incontinence. She denies pain with intercourse. She has pain with transitional positions (bed mobility, sit to stand), prolonged standing/walking (30 min), daily household activities.  -RS      Patient Participated in POC and Goals Yes  -RS         Fall Risk Assessment    Any falls in the past year: No  -RS         Services    Are you currently receiving Home Health services No  -RS         Daily Activities    Primary Language English  -RS      Are you able to read No  -RS      Are you able to write No  -RS      How does patient learn best? Listening;Reading;Demonstration  -RS      Pt Participated in POC and Goals Yes  -RS         Safety    Are you being hurt, hit, or frightened by anyone at home or in your life? No  -RS      Are you being neglected by a caregiver No  -RS         Urinary/Bowel History    Stress incontinence no  -RS      Post void dribble yes  -RS      Urgency present  -RS      Urgency episodes per day 3x per hour   -RS      Nocturia (times per night) 7  -RS         Pregnancy/Sexual History    Number of Pregnancies 4  -RS      Number of Miscarriages 1  plus one tubal  -RS      Number of Children 1  -RS      How many weeks pregnant are you? 26 weeks  -RS      Type of Previous Deliveries Vaginal  -RS      Due Date 06/14/24  -RS      Do you have radicular pain or numbness? No  -RS      Are you currently exercising? Yes  yes- yoga, used to walk/hike  -RS      Pain with initial penetration No  -RS      Pain with deep penetration No  -RS         Lumbar/SI Special Tests    Slump Test (Neural Tension) Right:;Negative  -RS      SI Distraction Test (SI Dysfunction) Right:;Positive  -RS      ABDIEL (hip vs. SI Dysfunction) Right:;Positive  -RS      FAIR Test (Piriformis Syndrome) Right:;Negative  -RS      Sacral Spring Test (SI Dysfunction) Right:;Positive  -RS      Lumbar/SI Special Tests Comments pt with increased lumbar lordosis and anterior pelvic tilt in standing, rounded shoulders noted  -RS         Lumbosacral Palpation    SI Right:;Tender  -RS      Piriformis Right:;Tender  -RS         General ROM    GENERAL ROM COMMENTS pt with pain with lumbar extension (50% WNL) with R sided pain  -RS         Outcome Measures    Outcome Measure Options Female NIH-CPSI  -RS         Female NIH-CPSI    Pain Total 3  -RS      Urinary Symptoms Total 2  -RS      Quality of Life Impact Total 0  -RS      Total Score 5  -RS         MMT (Manual Muscle Testing)    Rt Lower Ext Rt Hip Flexion;Rt Hip ABduction;Rt Hip Internal (Medial) Rotation;Rt Hip External (Lateral) Rotation;Rt Knee WNL;Rt Ankle WNL  -RS      Lt Lower Ext Lt Hip Flexion;Lt Hip ABduction;Lt Hip Internal (Medial) Rotation;Lt Hip External (Lateral) Rotation;Lt Knee WNL;Lt Ankle WNL  -RS         MMT Right Lower Ext    Rt Hip Flexion MMT, Gross Movement (4/5) good  -RS      Rt Hip ABduction MMT, Gross Movement (4-/5) good minus  -RS      Rt Hip Internal (Medial) Rotation MMT, Gross Movement  (4-/5) good minus  -RS      Rt Hip External (Lateral) Rotation MMT, Gross Movement (4/5) good  -RS         MMT Left Lower Ext    Lt Hip Flexion MMT, Gross Movement (4+/5) good plus  -RS      Lt Hip ABduction MMT, Gross Movement (4/5) good  -RS      Lt Hip Internal (Medial) Rotation MMT, Gross Movement (4+/5) good plus  -RS      Lt Hip External (Lateral) Rotation MMT, Gross Movement (4+/5) good plus  -RS                User Key  (r) = Recorded By, (t) = Taken By, (c) = Cosigned By      Initials Name Provider Type    RS Abby Alvarado, PT Physical Therapist                    Therapy Education  Education Details: Role of  PF PT, POC, differential diagnosis, initial HEP, expectations, goals, anatomy, role of PF. [id number]  Given: HEP  Program: New  How Provided: Verbal, Demonstration, Written  Provided to: Patient  Level of Understanding: Verbalized, Demonstrated      PT OP Goals       Row Name 03/08/24 1600          PT Short Term Goals    STG Date to Achieve 04/22/24  -RS     STG 1 The pt will demonstrate appropriate use of log roll technique and breathing mechanics to facilitate improved tolerance for transitional position performance.  -RS     STG 1 Progress New  -RS     STG 2 The pt will report urinating no more than 2x per hour on average to facilitate improved participation in daily activities.  -RS     STG 2 Progress New  -RS        Long Term Goals    LTG Date to Achieve 06/06/24  -RS     LTG 1 The pt will report urinating no more than 1x per hour during the day and 3x at night on average to facilitate improved participation in daily activities and improved restorative sleep pattern.  -RS     LTG 1 Progress New  -RS     LTG 2 the pt will report at least  50% reduction in pain with transitional positions to facilitate improved functional activity tolerance.  -RS     LTG 2 Progress New  -RS     LTG 3 The pt will be educated regarding labor preparation HEP to facilitate improved hip mobility and body awareness  during labor.  -RS     LTG 3 Progress New  -RS     LTG 4 The pt will demonstrate R hip strength at least4+/5 to facilitate improved pelvic stability and decreased pain .  -RS     LTG 4 Progress New  -RS        Time Calculation    PT Goal Re-Cert Due Date 06/06/24  -RS               User Key  (r) = Recorded By, (t) = Taken By, (c) = Cosigned By      Initials Name Provider Type    RS Abby Alvarado, PT Physical Therapist                     PT Assessment/Plan       Row Name 03/08/24 1600          PT Assessment    Functional Limitations Limitation in home management;Limitations in community activities;Performance in self-care ADL;Performance in work activities  -RS     Impairments Impaired muscle length;Impaired muscle power;Impaired muscle endurance;Range of motion;Posture;Peripheral nerve integrity;Pain;Muscle strength  -RS     Assessment Comments Ana Enriquez is a 23 y.o. female referred to physical therapy for pelvic pain in pregnancy. She presents with a stable clinical presentation, along with no remarkable comorbidities and personal factors of pt is currently 26 weeks pregnant that may impact her progress in the plan of care. Pt presents today with positive ABDIEL testing, positive sacral spring, tenderness to palpation R SIJ, decreased R hip ER/IR MMT, pain with lumbar extension in R SIJ, urinary frequency . her signs and symptoms are consistent with a physical therapy diagnosis of SIJ dysfunction. The previous impairments limit her ability to perform transitional positions, sleep, participate in recreational activities, tolerate prolonged standing/walking..Pt will benefit from skilled PT to address the previous impairments and return to PLOF.  -RS     Please refer to paper survey for additional self-reported information No  -RS     Rehab Potential Good  -RS     Patient/caregiver participated in establishment of treatment plan and goals Yes  -RS     Patient would benefit from skilled therapy intervention  Yes  -RS        PT Plan    PT Frequency 1x/week  -RS     Predicted Duration of Therapy Intervention (PT) 6-8 sessions  -RS     Planned CPT's? PT RE-EVAL: 21807;PT THER PROC EA 15 MIN: 36340;PT THER ACT EA 15 MIN: 01817;PT MANUAL THERAPY EA 15 MIN: 45247;PT NEUROMUSC RE-EDUCATION EA 15 MIN: 26586;PT GAIT TRAINING EA 15 MIN: 47610;PT SELF CARE/HOME MGMT/TRAIN EA 15: 60839;PT HOT OR COLD PACK TREAT MCARE;PT ELECTRICAL STIM UNATTEND: ;PT TRACTION LUMBAR: 00586;PT EVAL LOW COMPLEXITY: 52467  -RS     PT Plan Comments Next session, urge suppression, how was belly band, clamshells, hip hikes, hip flexor stretch  -RS               User Key  (r) = Recorded By, (t) = Taken By, (c) = Cosigned By      Initials Name Provider Type    RS Abby Alvarado, PT Physical Therapist                       OP Exercises       Row Name 03/08/24 1600 03/08/24 1500          Subjective    Patient Reason for Visit -- Back Pain;Prenatal Care  -RS     Brief Description of Chief Complaint -- The pt is a 22 yo female who presents with R sided low back pain. She is currently 26 weeks pregnant and the pain began about 2 months ago but has worsened in the past 2-3 weeks. She takes tylenol, uses heat (helps temporarily), massage from her boyfriend, yoga, and walking. She feels like the walking  has helped the most.  She experiences pain on the right side of her R low back that does not travel down her leg. She has given birth 1 other time (2 years prior) vaginally with no tearing. She denies stress incontinence but does report urinary urgency, peeing 3 times per hour and 7 times per night. She feels pelvic pressure but denies incontinence. She denies pain with intercourse. She has pain with transitional positions (bed mobility, sit to stand), prolonged standing/walking (30 min), daily household activities.  -RS     Patient Participated in POC and Goals -- Yes  -RS        Total Minutes    90977 - PT Therapeutic Activity Minutes 18  -RS --         Exercise 1    Exercise Name 1 anatomy review/education  -RS --     Additional Comments with model  -RS --        Exercise 2    Exercise Name 2 LTR  -RS --     Cueing 2 Verbal;Demo  -RS --     Reps 2 10ea  -RS --        Exercise 3    Exercise Name 3 HL HIP AB  -RS --     Cueing 3 Verbal;Demo  -RS --     Reps 3 20  -RS --     Time 3 GTB  -RS --        Exercise 4    Exercise Name 4 HL HIP AD  -RS --     Cueing 4 Verbal;Demo  -RS --     Reps 4 10  -RS --     Time 4 10  -RS --     Additional Comments ball  -RS --        Exercise 5    Exercise Name 5 discussion of eliana roll and exhale with effort  -RS --        Exercise 6    Exercise Name 6 discussio of belly band  -RS --               User Key  (r) = Recorded By, (t) = Taken By, (c) = Cosigned By      Initials Name Provider Type    RS Abby Alvarado, PT Physical Therapist                                            Time Calculation:     Start Time: 1535  Stop Time: 1613  Time Calculation (min): 38 min  Timed Charges  14112 - PT Therapeutic Activity Minutes: 18  Untimed Charges  PT Eval/Re-eval Minutes: 20  Total Minutes  Timed Charges Total Minutes: 18  Untimed Charges Total Minutes: 20   Total Minutes: 20     Therapy Charges for Today       Code Description Service Date Service Provider Modifiers Qty    95193594228 HC PT THERAPEUTIC ACT EA 15 MIN 3/8/2024 Abby Alvarado, PT GP 1    51181920898 HC PT EVAL LOW COMPLEXITY 2 3/8/2024 Abby Alvarado, PT GP 1                      Abby Alvarado PT  3/8/2024

## 2024-03-20 ENCOUNTER — ROUTINE PRENATAL (OUTPATIENT)
Dept: OBSTETRICS AND GYNECOLOGY | Facility: CLINIC | Age: 24
End: 2024-03-20
Payer: COMMERCIAL

## 2024-03-20 VITALS — SYSTOLIC BLOOD PRESSURE: 123 MMHG | WEIGHT: 167.6 LBS | DIASTOLIC BLOOD PRESSURE: 69 MMHG | BODY MASS INDEX: 29.7 KG/M2

## 2024-03-20 DIAGNOSIS — O09.899 HISTORY OF PRETERM DELIVERY, CURRENTLY PREGNANT: ICD-10-CM

## 2024-03-20 DIAGNOSIS — Z3A.27 27 WEEKS GESTATION OF PREGNANCY: Primary | ICD-10-CM

## 2024-03-20 DIAGNOSIS — O09.299 HX OF PREECLAMPSIA, PRIOR PREGNANCY, CURRENTLY PREGNANT: ICD-10-CM

## 2024-03-20 DIAGNOSIS — Z34.92 PRENATAL CARE IN SECOND TRIMESTER: ICD-10-CM

## 2024-03-20 DIAGNOSIS — Z34.82 ENCOUNTER FOR SUPERVISION OF OTHER NORMAL PREGNANCY IN SECOND TRIMESTER: ICD-10-CM

## 2024-03-20 LAB
GLUCOSE UR STRIP-MCNC: NEGATIVE MG/DL
PROT UR STRIP-MCNC: NEGATIVE MG/DL

## 2024-03-20 NOTE — PROGRESS NOTES
Chief Complaint   Patient presents with    Routine Prenatal Visit     Patient is here today for 1 hr gtt      Ana Enriquez is a 23 y.o.  at 27w5d who presents for routine prenatal visit. She reports doing well and has no complaints today. Denies vaginal bleeding, cramping, contractions, LOF. She reports active fetal movement.     /69   Wt 76 kg (167 lb 9.6 oz)   LMP 2023   BMI 29.70 kg/m²    Gen: well appearing, NAD   Abd: gravid, nontender  See OB Flowsheet    ASSESSMENT:   IUP at 27w5d   Prenatal care in second trimester   Supervision of normal pregnancy   History of preeclampsia in previous pregnancy  History of PPROM at 35 weeks with  delivery in subsequent pregnancy   Headaches affecting pregnancy - recommended magnesium supplementation per Neurology     PLAN:  Problem list reviewed and updated.   Reviewed expectations of this stage of pregnancy.   Third trimester precautions reviewed including  labor signs, monitoring fetal movements.   Collected 1h GTT, CBC, T. Pallidum Antibody today.   Declined Tdap vaccination today but may consider at future visit.   Return in about 2 weeks (around 4/3/2024) for Prenatal visit.    Patient Active Problem List    Diagnosis Date Noted    Normal labor 2021    Severe pre-eclampsia, third trimester 2021     (normal spontaneous vaginal delivery) 2021    Swelling 2021    Pre-syncope 2021    Shortness of breath 10/07/2021    Right upper quadrant pain 10/07/2021    Chest pain 10/07/2021    Dizziness 10/07/2021    Positive urine drug screen:+THC. 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    Polyphagia 2018    High risk medication use 2018    Headache 2016    Anxiety 2016    Abnormal finding on thyroid function test  01/31/2016    Bipolar I disorder, single manic episode 01/31/2016    New onset seizure 01/31/2016    Panic disorder without agoraphobia 01/31/2016       Orders Placed This Encounter   Procedures    POC Urinalysis Dipstick     Order Specific Question:   Release to patient     Answer:   Routine Release [[id number]]     Deedee Valencia MD

## 2024-03-21 ENCOUNTER — TELEPHONE (OUTPATIENT)
Dept: PHYSICAL THERAPY | Facility: HOSPITAL | Age: 24
End: 2024-03-21
Payer: COMMERCIAL

## 2024-03-21 LAB
ERYTHROCYTE [DISTWIDTH] IN BLOOD BY AUTOMATED COUNT: 12.5 % (ref 11.7–15.4)
GLUCOSE 1H P 50 G GLC PO SERPL-MCNC: 114 MG/DL (ref 70–139)
HCT VFR BLD AUTO: 32.6 % (ref 34–46.6)
HGB BLD-MCNC: 11 G/DL (ref 11.1–15.9)
MCH RBC QN AUTO: 31.4 PG (ref 26.6–33)
MCHC RBC AUTO-ENTMCNC: 33.7 G/DL (ref 31.5–35.7)
MCV RBC AUTO: 93 FL (ref 79–97)
PLATELET # BLD AUTO: 239 X10E3/UL (ref 150–450)
RBC # BLD AUTO: 3.5 X10E6/UL (ref 3.77–5.28)
T PALLIDUM AB SER QL IF: NON REACTIVE
WBC # BLD AUTO: 11 X10E3/UL (ref 3.4–10.8)

## 2024-03-21 NOTE — TELEPHONE ENCOUNTER
Spoke to pt regarding missed appt, pt states she thought it was tomorrow and was apologetic. Confirmed upcoming appt.

## 2024-03-22 ENCOUNTER — PATIENT OUTREACH (OUTPATIENT)
Dept: LABOR AND DELIVERY | Facility: HOSPITAL | Age: 24
End: 2024-03-22
Payer: COMMERCIAL

## 2024-03-22 NOTE — OUTREACH NOTE
[facility]  Check-In    Current Estimated Gestational Age: 28w0d      Questions/Answers      Flowsheet Row Responses   Best Method for Contacting Cell   Demographics Reviewed Yes   Currently Employed No   Gender(s) and Name(s) girl   Baby Active/Feeling Fetal Movemen Yes   How are you presently feeling? good   Supplies ready for baby Car Seat, Clothing, Crib, Feeding Supplies, Diapers, Breast Pump   Resource/Environmental Concerns None   Do you have any questions related to your care experience, your pregnancy, plans for delivery, any concerns, etc? No   Other Education [facility] Benefits, Insurance benefits/Incentives          Pt doing well. Her breast pump arrived so she now has all necessary supplies. She reports that her BP has been WNL. She reports active fetal movement. Denies resource needs today. F/u in one month.     Soco Damon RN  Maternity Nurse Navigator    3/22/2024, 14:11 EDT

## 2024-03-28 ENCOUNTER — TELEPHONE (OUTPATIENT)
Dept: PHYSICAL THERAPY | Facility: HOSPITAL | Age: 24
End: 2024-03-28
Payer: COMMERCIAL

## 2024-04-03 ENCOUNTER — ROUTINE PRENATAL (OUTPATIENT)
Dept: OBSTETRICS AND GYNECOLOGY | Facility: CLINIC | Age: 24
End: 2024-04-03
Payer: COMMERCIAL

## 2024-04-03 VITALS — DIASTOLIC BLOOD PRESSURE: 67 MMHG | BODY MASS INDEX: 29.73 KG/M2 | SYSTOLIC BLOOD PRESSURE: 102 MMHG | WEIGHT: 167.8 LBS

## 2024-04-03 DIAGNOSIS — Z34.93 PRENATAL CARE, THIRD TRIMESTER: ICD-10-CM

## 2024-04-03 DIAGNOSIS — Z3A.29 29 WEEKS GESTATION OF PREGNANCY: Primary | ICD-10-CM

## 2024-04-03 DIAGNOSIS — Z87.59 HISTORY OF PRETERM PREMATURE RUPTURE OF MEMBRANES (PPROM): ICD-10-CM

## 2024-04-03 DIAGNOSIS — O99.019 MATERNAL ANEMIA IN PREGNANCY, ANTEPARTUM: ICD-10-CM

## 2024-04-03 DIAGNOSIS — O09.299 HX OF PREECLAMPSIA, PRIOR PREGNANCY, CURRENTLY PREGNANT: ICD-10-CM

## 2024-04-03 LAB
GLUCOSE UR STRIP-MCNC: NEGATIVE MG/DL
PROT UR STRIP-MCNC: NEGATIVE MG/DL

## 2024-04-03 NOTE — PROGRESS NOTES
Chief Complaint   Patient presents with    Routine Prenatal Visit      Ana Enriquez is a 23 y.o.  at 29w5d who presents for routine prenatal visit. She reports doing well and has no major complaints today. Denies vaginal bleeding, cramping, contractions, LOF. She reports active fetal movement.     /67   Wt 76.1 kg (167 lb 12.8 oz)   LMP 2023   BMI 29.73 kg/m²    Gen: well appearing, NAD   Abd: gravid, nontender  See OB Flowsheet    ASSESSMENT:   IUP at 29w5d   Prenatal care in third trimester   History of preeclampsia in previous pregnancy  History of PPROM at 35 weeks with  delivery in subsequent pregnancy   Headaches affecting pregnancy - recommended magnesium supplementation per Neurology   Mild maternal anemia- Hgb 11.0, continue PNV     PLAN:  Problem list reviewed and updated.   Reviewed expectations of this stage of pregnancy.   Third trimester precautions reviewed including labor signs, monitoring fetal movements.   Offered Tdap vaccination today but the patient would like to defer today.   Return in about 2 weeks (around 2024) for Prenatal visit.    Patient Active Problem List    Diagnosis Date Noted    Normal labor 2021    Severe pre-eclampsia, third trimester 2021     (normal spontaneous vaginal delivery) 2021    Swelling 2021    Pre-syncope 2021    Shortness of breath 10/07/2021    Right upper quadrant pain 10/07/2021    Chest pain 10/07/2021    Dizziness 10/07/2021    Positive urine drug screen:+THC. 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    Polyphagia 2018    High risk medication use 2018    Headache 2016    Anxiety 2016    Abnormal finding on thyroid function test 2016    Bipolar I disorder, single manic episode 2016    New  onset seizure 01/31/2016    Panic disorder without agoraphobia 01/31/2016       Orders Placed This Encounter   Procedures    POC Urinalysis Dipstick     Order Specific Question:   Release to patient     Answer:   Routine Release [[id number]]     Deedee Valencia MD

## 2024-04-10 RX ORDER — PRENATAL VIT/IRON FUM/FOLIC AC 27 MG-1 MG
1 TABLET ORAL DAILY
Qty: 30 EACH | Refills: 10 | Status: SHIPPED | OUTPATIENT
Start: 2024-04-10

## 2024-04-17 ENCOUNTER — ROUTINE PRENATAL (OUTPATIENT)
Dept: OBSTETRICS AND GYNECOLOGY | Facility: CLINIC | Age: 24
End: 2024-04-17
Payer: COMMERCIAL

## 2024-04-17 VITALS — WEIGHT: 172.4 LBS | BODY MASS INDEX: 30.55 KG/M2 | SYSTOLIC BLOOD PRESSURE: 125 MMHG | DIASTOLIC BLOOD PRESSURE: 73 MMHG

## 2024-04-17 DIAGNOSIS — O99.019 MATERNAL ANEMIA IN PREGNANCY, ANTEPARTUM: ICD-10-CM

## 2024-04-17 DIAGNOSIS — Z34.93 PRENATAL CARE IN THIRD TRIMESTER: ICD-10-CM

## 2024-04-17 DIAGNOSIS — Z3A.31 31 WEEKS GESTATION OF PREGNANCY: Primary | ICD-10-CM

## 2024-04-17 DIAGNOSIS — O09.893 HISTORY OF PRETERM DELIVERY, CURRENTLY PREGNANT IN THIRD TRIMESTER: ICD-10-CM

## 2024-04-17 DIAGNOSIS — O09.299 HX OF PREECLAMPSIA, PRIOR PREGNANCY, CURRENTLY PREGNANT: ICD-10-CM

## 2024-04-17 DIAGNOSIS — Z03.71 NO LEAKAGE OF AMNIOTIC FLUID INTO VAGINA: ICD-10-CM

## 2024-04-17 LAB
GLUCOSE UR STRIP-MCNC: NEGATIVE MG/DL
PROT UR STRIP-MCNC: NEGATIVE MG/DL

## 2024-04-17 NOTE — PROGRESS NOTES
Chief Complaint   Patient presents with    Routine Prenatal Visit      Ana Enriquez is a 23 y.o.  at 31w5d who presents for routine prenatal visit. She reports possible leakage of fluid. She states that she feels that her underwear is wet when she is moving up and down. This has occurred a couple of times without odor or irritation.  Denies vaginal bleeding, cramping, contractions. She reports active fetal movement.     /73   Wt 78.2 kg (172 lb 6.4 oz)   LMP 2023   BMI 30.55 kg/m²    Gen: well appearing, NAD   Abd: gravid, nontender  Pelvis: normal external female genitalia, normal vulva, normal appearing vagina mucosa, small amount of white milky discharge in vaginal vault, no pooling with valsalva, negative nitrazine, negative ferning on microscopy. SVE 0/0/-3   See OB Flowsheet    ASSESSMENT:   IUP at 31w5d   Prenatal care in third trimester   History of preeclampsia in previous pregnancy  History of PPROM at 35 weeks with  delivery in subsequent pregnancy   Headaches affecting pregnancy - recommended magnesium supplementation per Neurology   Mild maternal anemia- Hgb 11.0, continue PNV   No evidence of leakage of fluid     PLAN:  Problem list reviewed and updated.   Reviewed expectations of this stage of pregnancy.   Third trimester precautions reviewed including labor signs, monitoring fetal movements.   No evidence of leakage of fluid based on clinical exam today. If she continues to have episodes of leakage, advised the patient to present to OB ED.   Will obtain growth ultrasound at next visit given maternal anemia affecting pregnancy and history of  delivery last pregnancy at 35 weeks.   Return in about 2 weeks (around 2024) for growth ultrasound, Prenatal visit.    Patient Active Problem List    Diagnosis Date Noted    Normal labor 2021    Severe pre-eclampsia, third trimester 2021     (normal spontaneous vaginal delivery) 2021    Swelling  12/27/2021    Pre-syncope 11/08/2021    Shortness of breath 10/07/2021    Right upper quadrant pain 10/07/2021    Chest pain 10/07/2021    Dizziness 10/07/2021    Positive urine drug screen:+THC. Needs = 06/21/2021    h/o ectopic pregnancy 06/03/2021    Nausea and vomiting during pregnancy prior to 22 weeks gestation 06/03/2021    Herpes labialis 11/15/2018    Self mutilating behavior 11/15/2018    Polyphagia 04/18/2018    High risk medication use 01/05/2018    Headache 09/16/2016    Anxiety 04/05/2016    Abnormal finding on thyroid function test 01/31/2016    Bipolar I disorder, single manic episode 01/31/2016    New onset seizure 01/31/2016    Panic disorder without agoraphobia 01/31/2016       Orders Placed This Encounter   Procedures    POC Urinalysis Dipstick     Order Specific Question:   Release to patient     Answer:   Routine Release [[id number]]     Deedee Valencia MD

## 2024-05-01 ENCOUNTER — PATIENT OUTREACH (OUTPATIENT)
Dept: LABOR AND DELIVERY | Facility: HOSPITAL | Age: 24
End: 2024-05-01
Payer: COMMERCIAL

## 2024-05-01 ENCOUNTER — ROUTINE PRENATAL (OUTPATIENT)
Dept: OBSTETRICS AND GYNECOLOGY | Facility: CLINIC | Age: 24
End: 2024-05-01
Payer: COMMERCIAL

## 2024-05-01 VITALS — SYSTOLIC BLOOD PRESSURE: 119 MMHG | BODY MASS INDEX: 30.55 KG/M2 | WEIGHT: 172.4 LBS | DIASTOLIC BLOOD PRESSURE: 74 MMHG

## 2024-05-01 DIAGNOSIS — Z34.93 PRENATAL CARE IN THIRD TRIMESTER: ICD-10-CM

## 2024-05-01 DIAGNOSIS — O26.813 FATIGUE DURING PREGNANCY IN THIRD TRIMESTER: ICD-10-CM

## 2024-05-01 DIAGNOSIS — Z87.59 HISTORY OF PRETERM PREMATURE RUPTURE OF MEMBRANES (PPROM): ICD-10-CM

## 2024-05-01 DIAGNOSIS — O09.299 HX OF PREECLAMPSIA, PRIOR PREGNANCY, CURRENTLY PREGNANT: ICD-10-CM

## 2024-05-01 DIAGNOSIS — O99.019 MATERNAL ANEMIA IN PREGNANCY, ANTEPARTUM: ICD-10-CM

## 2024-05-01 DIAGNOSIS — Z36.89 ENCOUNTER FOR ULTRASOUND TO ASSESS FETAL GROWTH: ICD-10-CM

## 2024-05-01 DIAGNOSIS — Z3A.33 33 WEEKS GESTATION OF PREGNANCY: Primary | ICD-10-CM

## 2024-05-01 NOTE — OUTREACH NOTE
[facility]  Unable to Reach       Questions/Answers      Flowsheet Row Responses   Pending Outreach Prenatal Check-in   Call Attempt First   Outcome Left message   Next Call Attempt Date 05/02/24            Soco Damon RN  Maternity Nurse Navigator    5/1/2024, 10:53 EDT

## 2024-05-01 NOTE — OUTREACH NOTE
[facility]  Check-In    Current Estimated Gestational Age: 33w5d      Questions/Answers      Flowsheet Row Responses   Best Method for Contacting Cell   Demographics Reviewed Yes   Currently Employed No   Able to keep appointments as scheduled Yes   Gender(s) and Name(s) girl   Baby Active/Feeling Fetal Movemen Yes   How are you presently feeling? good   Supplies ready for baby Car Seat, Clothing, Crib, Diapers, Feeding Supplies, Breast Pump   Resource/Environmental Concerns None   Do you have any questions related to your care experience, your pregnancy, plans for delivery, any concerns, etc? No   Other Education [facility] Benefits, Insurance benefits/Incentives          Pt doing well, reports that everything has been stable with her BP. She has all necessary infant items. Reviewed labor precautions and fetal movement. F/u inpatient after delivery.     Soco Damon RN  Maternity Nurse Navigator    5/1/2024, 11:04 EDT

## 2024-05-01 NOTE — PROGRESS NOTES
Chief Complaint   Patient presents with    Routine Prenatal Visit      Ana Enriquez is a 23 y.o.  at 33w5d who presents for routine prenatal visit. She reports feeling more tired and having contractions 3 days ago that has since resolved. Denies vaginal bleeding or LOF. She reports active fetal movement.    /74   Wt 78.2 kg (172 lb 6.4 oz)   LMP 2023   BMI 30.55 kg/m²    Gen: well appearing, NAD   Abd: gravid, nontender  See OB Flowsheet    ASSESSMENT:   IUP at 33w5d   Prenatal care in third trimester   History of preeclampsia in previous pregnancy  History of PPROM at 35 weeks with  delivery in subsequent pregnancy   Headaches affecting pregnancy - recommended magnesium supplementation per Neurology   Mild maternal anemia- Hgb 11.0, continue PNV   Fatigue of pregnancy   Fetal growth ultrasound     PLAN:  Problem list reviewed and updated.   Reviewed expectations of this stage of pregnancy.   Third trimester precautions reviewed including labor signs, monitoring fetal movements.   Discussed ultrasound results that demonstrated normally grown fetus measuring 2266 g (5 lb 0 oz) (45%ile, AC 48%ile), normal JULIANE 10.9 cm, anterior placenta, cephalic presentation.   Encouraged increased rest and increase in red meats and green leafy vegetables in diet to help replace possible iron losses.   Return in about 2 weeks (around 5/15/2024) for Prenatal visit.    Patient Active Problem List    Diagnosis Date Noted    Normal labor 2021    Severe pre-eclampsia, third trimester 2021     (normal spontaneous vaginal delivery) 2021    Swelling 2021    Pre-syncope 2021    Shortness of breath 10/07/2021    Right upper quadrant pain 10/07/2021    Chest pain 10/07/2021    Dizziness 10/07/2021    Positive urine drug screen:+THC. 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    Deedee Valencia MD

## 2024-05-15 ENCOUNTER — ROUTINE PRENATAL (OUTPATIENT)
Dept: OBSTETRICS AND GYNECOLOGY | Facility: CLINIC | Age: 24
End: 2024-05-15
Payer: COMMERCIAL

## 2024-05-15 ENCOUNTER — TELEPHONE (OUTPATIENT)
Dept: OBSTETRICS AND GYNECOLOGY | Facility: CLINIC | Age: 24
End: 2024-05-15

## 2024-05-15 VITALS — SYSTOLIC BLOOD PRESSURE: 118 MMHG | WEIGHT: 177.4 LBS | BODY MASS INDEX: 31.43 KG/M2 | DIASTOLIC BLOOD PRESSURE: 79 MMHG

## 2024-05-15 DIAGNOSIS — O99.019 MATERNAL ANEMIA IN PREGNANCY, ANTEPARTUM: ICD-10-CM

## 2024-05-15 DIAGNOSIS — O09.899 HISTORY OF PRETERM DELIVERY, CURRENTLY PREGNANT: ICD-10-CM

## 2024-05-15 DIAGNOSIS — Z3A.35 35 WEEKS GESTATION OF PREGNANCY: Primary | ICD-10-CM

## 2024-05-15 DIAGNOSIS — Z87.59 HISTORY OF PRETERM PREMATURE RUPTURE OF MEMBRANES (PPROM): ICD-10-CM

## 2024-05-15 DIAGNOSIS — O09.299 HX OF PREECLAMPSIA, PRIOR PREGNANCY, CURRENTLY PREGNANT: ICD-10-CM

## 2024-05-15 DIAGNOSIS — Z34.93 PRENATAL CARE, THIRD TRIMESTER: ICD-10-CM

## 2024-05-15 LAB
GLUCOSE UR STRIP-MCNC: NEGATIVE MG/DL
PROT UR STRIP-MCNC: NEGATIVE MG/DL

## 2024-05-15 NOTE — PROGRESS NOTES
Chief Complaint   Patient presents with    Routine Prenatal Visit      Ana Enriquez is a 23 y.o.  at 35w5d who presents for routine prenatal visit. She reports having [place] contractions that are irregular.  Denies vaginal bleeding or LOF. She reports active fetal movement.     /79   Wt 80.5 kg (177 lb 6.4 oz)   LMP 2023   BMI 31.43 kg/m²    Gen: well appearing, NAD   Abd: gravid, nontender  SVE: /-3   See OB Flowsheet    ASSESSMENT:   IUP at 35w5d   Prenatal care in third trimester   History of preeclampsia in previous pregnancy  History of PPROM at 35 weeks with  delivery in subsequent pregnancy   Headaches affecting pregnancy - recommended magnesium supplementation per Neurology - currently doing well   Mild maternal anemia- Hgb 11.0, continue PNV     PLAN:  Problem list reviewed and updated.   Reviewed expectations of this stage of pregnancy.   Third trimester precautions reviewed including labor signs, monitoring fetal movements.   Collected GBS swab today.   Return in about 1 week (around 2024).    Patient Active Problem List    Diagnosis Date Noted    Normal labor 2021    Severe pre-eclampsia, third trimester 2021     (normal spontaneous vaginal delivery) 2021    Swelling 2021    Pre-syncope 2021    Shortness of breath 10/07/2021    Right upper quadrant pain 10/07/2021    Chest pain 10/07/2021    Dizziness 10/07/2021    Positive urine drug screen:+THC. 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    Polyphagia 2018    High risk medication use 2018    Headache 2016    Anxiety 2016    Abnormal finding on thyroid function test 2016    Bipolar I disorder, single manic episode 2016    New onset seizure 2016    Panic disorder  without agoraphobia 01/31/2016       Orders Placed This Encounter   Procedures    Strep Gp B Culture+Rfx ([facility]) - Swab, Vaginal/Rectum     Order Specific Question:   Release to patient     Answer:   Routine Release [[id number]]    POC Urinalysis Dipstick     Order Specific Question:   Release to patient     Answer:   Routine Release [[id number]]     Deedee Valencia MD

## 2024-05-15 NOTE — TELEPHONE ENCOUNTER
Caller: Ana Enriquez    Relationship: Self    Best call back number: [phone] (home)     What is the best time to reach you: ANYTIME    Who are you requesting to speak with (clinical staff, provider,  specific staff member): DIANNA, OFFICE STAFF    PT CALLED IN, WANTS HELP WITH FMLA PAPERWORK. CANNOT SHARE DOCUMENTS AND WOULD LIKE TO SPEAK TO CAROL ABOUT IT. PLEASE CALL BACK ASAP.

## 2024-05-16 NOTE — TELEPHONE ENCOUNTER
Caller: Ana Enriquez    Relationship to patient: Self    Best call back number: [phone] (home)       Patient is needing: PT CALLED IN TO CHECK ON STATUS OF CALL BACK WITH MATEUSZ ABOUT FMLA PAPERWORK. PLEASE CALL BACK ANY TIME.

## 2024-05-20 LAB — B-HEM STREP SPEC QL CULT: NEGATIVE

## 2024-05-22 ENCOUNTER — ROUTINE PRENATAL (OUTPATIENT)
Dept: OBSTETRICS AND GYNECOLOGY | Facility: CLINIC | Age: 24
End: 2024-05-22
Payer: COMMERCIAL

## 2024-05-22 VITALS — DIASTOLIC BLOOD PRESSURE: 76 MMHG | WEIGHT: 175.6 LBS | SYSTOLIC BLOOD PRESSURE: 120 MMHG | BODY MASS INDEX: 31.11 KG/M2

## 2024-05-22 DIAGNOSIS — Z34.93 PRENATAL CARE, THIRD TRIMESTER: ICD-10-CM

## 2024-05-22 DIAGNOSIS — Z3A.36 36 WEEKS GESTATION OF PREGNANCY: Primary | ICD-10-CM

## 2024-05-22 DIAGNOSIS — O09.899 HISTORY OF PRETERM DELIVERY, CURRENTLY PREGNANT: ICD-10-CM

## 2024-05-22 DIAGNOSIS — Z87.59 HISTORY OF PRETERM PREMATURE RUPTURE OF MEMBRANES (PPROM): ICD-10-CM

## 2024-05-22 DIAGNOSIS — O09.299 HX OF PREECLAMPSIA, PRIOR PREGNANCY, CURRENTLY PREGNANT: ICD-10-CM

## 2024-05-22 DIAGNOSIS — O99.019 MATERNAL ANEMIA IN PREGNANCY, ANTEPARTUM: ICD-10-CM

## 2024-05-22 LAB
GLUCOSE UR STRIP-MCNC: NEGATIVE MG/DL
PROT UR STRIP-MCNC: NEGATIVE MG/DL

## 2024-05-22 NOTE — PROGRESS NOTES
Chief Complaint   Patient presents with    Routine Prenatal Visit      Ana Enriquez is a 23 y.o.  at 36w5d who presents for routine prenatal visit. She reports still having contractions but nothing regular. Otherwise, she is doing well.  Denies vaginal bleeding or LOF. She reports active fetal movement.     /76   Wt 79.7 kg (175 lb 9.6 oz)   LMP 2023   BMI 31.11 kg/m²    Gen: well appearing, NAD   Abd: gravid, nontender  SVE: 2-50/-2  See OB Flowsheet    ASSESSMENT:   IUP at 36w5d   Prenatal care in third trimester   History of preeclampsia in previous pregnancy  History of PPROM at 35 weeks with  delivery in subsequent pregnancy   Headaches affecting pregnancy - recommended magnesium supplementation per Neurology - doing well currently   Mild maternal anemia- Hgb 11.0, continue PNV     PLAN:  Problem list reviewed and updated.   Reviewed expectations of this stage of pregnancy.   Third trimester precautions reviewed including labor signs, monitoring fetal movements.   Reviewed negative GBS testing.   Return in about 1 week (around 2024) for Prenatal visit.    Patient Active Problem List    Diagnosis Date Noted    Normal labor 2021    Severe pre-eclampsia, third trimester 2021     (normal spontaneous vaginal delivery) 2021    Swelling 2021    Pre-syncope 2021    Shortness of breath 10/07/2021    Right upper quadrant pain 10/07/2021    Chest pain 10/07/2021    Dizziness 10/07/2021    Positive urine drug screen:+THC. 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

## 2024-05-27 ENCOUNTER — TELEPHONE (OUTPATIENT)
Dept: OBSTETRICS AND GYNECOLOGY | Facility: CLINIC | Age: 24
End: 2024-05-27
Payer: COMMERCIAL

## 2024-05-27 ENCOUNTER — HOSPITAL ENCOUNTER (EMERGENCY)
Facility: HOSPITAL | Age: 24
Discharge: HOME | End: 2024-05-27
Attending: STUDENT IN AN ORGANIZED HEALTH CARE EDUCATION/TRAINING PROGRAM | Admitting: OBSTETRICS & GYNECOLOGY
Payer: COMMERCIAL

## 2024-05-27 VITALS
RESPIRATION RATE: 18 BRPM | BODY MASS INDEX: 31.01 KG/M2 | DIASTOLIC BLOOD PRESSURE: 76 MMHG | WEIGHT: 175 LBS | SYSTOLIC BLOOD PRESSURE: 116 MMHG | HEIGHT: 63 IN | TEMPERATURE: 98.8 F

## 2024-05-27 LAB
A1 MICROGLOB PLACENTAL VAG QL: NEGATIVE
BILIRUB UR QL STRIP: NEGATIVE
CLARITY UR: CLEAR
COLOR UR: YELLOW
GLUCOSE UR STRIP-MCNC: NEGATIVE MG/DL
HGB UR QL STRIP.AUTO: NEGATIVE
KETONES UR QL STRIP: ABNORMAL
LEUKOCYTE ESTERASE UR QL STRIP.AUTO: NEGATIVE
NITRITE UR QL STRIP: NEGATIVE
PH UR STRIP.AUTO: 7 [PH] (ref 5–8)
PROT UR QL STRIP: NEGATIVE
SP GR UR STRIP: 1.01 (ref 1–1.03)
UROBILINOGEN UR QL STRIP: ABNORMAL

## 2024-05-27 PROCEDURE — 99284 EMERGENCY DEPT VISIT MOD MDM: CPT | Performed by: OBSTETRICS & GYNECOLOGY

## 2024-05-27 PROCEDURE — 87086 URINE CULTURE/COLONY COUNT: CPT | Performed by: OBSTETRICS & GYNECOLOGY

## 2024-05-27 PROCEDURE — 59025 FETAL NON-STRESS TEST: CPT

## 2024-05-27 PROCEDURE — 81003 URINALYSIS AUTO W/O SCOPE: CPT | Performed by: OBSTETRICS & GYNECOLOGY

## 2024-05-27 PROCEDURE — 84112 EVAL AMNIOTIC FLUID PROTEIN: CPT | Performed by: OBSTETRICS & GYNECOLOGY

## 2024-05-27 NOTE — TELEPHONE ENCOUNTER
Pt called after hours service believing her water may have broken.   I attempted to call the patient back twice with no answer. I left voicemail for the patient to go to [facility] labor and delivery for evaluation of possible rupture membranes.  I also sent her a message through [employer] application recommending the same

## 2024-05-27 NOTE — OBED NOTES
[facility] Note OB        Patient Name: Ana Enriquez  YOB: 2000  MRN: [id number]  Admission Date: 2024  1:12 PM  Date of Service: 2024    Chief Complaint: Leaking Fluid (Pt to [facility] with c/o leaking clear fluid at aprox 1130, pt denies bleeding, reports constant back pain and some down to right side of abdomen, baby active)        Subjective     Ana Enriquez is a 23 y.o. female  at 37w3d with Estimated Date of Delivery: 24 who presents with the chief complaint listed above.  She sees Deedee Valencia MD for her prenatal care. Her pregnancy has been complicated by:   history of  delivery due to PPROM, anemia, history of pre-eclampsia .        She describes fetal movement as normal.  She is concerned for rupture of membranes.  She denies vaginal bleeding. She is not feeling contractions.          Objective   Patient Active Problem List    Diagnosis     Normal labor [O80, Z37.9]     Severe pre-eclampsia, third trimester [O14.13]      (normal spontaneous vaginal delivery) [O80]     Swelling [R60.9]     Pre-syncope [R55]     Shortness of breath [R06.02]     Right upper quadrant pain [R10.11]     Chest pain [R07.9]     Dizziness [R42, R55]     Positive urine drug screen:+THC. Needs = [R82.5]     h/o ectopic pregnancy [O00.80]     Nausea and vomiting during pregnancy prior to 22 weeks gestation [O21.9]     Herpes labialis [B00.1]     Self mutilating behavior [Z72.89]     Polyphagia [R63.2]     High risk medication use [Z79.899]     Headache [R51.9]     Anxiety [F41.9]     Abnormal finding on thyroid function test [R94.6]     Bipolar I disorder, single manic episode [F30.9]     New onset seizure [R56.9]     Panic disorder without agoraphobia [F41.0]         OB History    Para Term  AB Living   3 1 0 1 1 1   SAB IAB Ectopic Molar Multiple Live Births   0 0 1 0 0 1      # Outcome Date GA Lbr Leonidas/2nd Weight Sex Type Anes PTL Lv   3 Current            2   21 36w5d 10:14 / 01:07 2660 g (5 lb 13.8 oz) F Vag-Spont EPI Y KAMILAH      Name: LUIS A SANTOS      Apgar1: 8  Apgar5: 9   1 Ectopic               Obstetric Comments    at 35 weeks due to severe preeclampsia.  5 lbs approx.        Past Medical History:   Diagnosis Date    Cannabis abuse     Ectopic pregnancy     Seizure in childhood     Self-mutilation     cutting       Past Surgical History:   Procedure Laterality Date    WISDOM TOOTH EXTRACTION         No current facility-administered medications on file prior to encounter.     Current Outpatient Medications on File Prior to Encounter   Medication Sig Dispense Refill    Prenatal Vit-Fe Fumarate-FA (Prenatal/Folic Acid) tablet Take 1 tablet by mouth Daily. 30 each 10    Sodium Fluoride 5000 Sensitive 1.1-5 % gel       valACYclovir (VALTREX) 1000 MG tablet Take 2 tablets by mouth.      [DISCONTINUED] magnesium oxide (MAG-OX) 400 MG tablet Take 1 tablet by mouth Daily for 180 days. (Patient not taking: Reported on 3/20/2024) 90 tablet 1    [DISCONTINUED] Probiotic Product (Probiotic Pearls Womens) capsule Take 1 capsule by mouth Daily. (Patient not taking: Reported on 3/20/2024) 30 capsule 3       Allergies   Allergen Reactions    Penicillins Hives       Family History   Problem Relation Age of Onset    Nephrolithiasis Mother     Other Maternal Grandmother         fibrocystic disease of breast    Nephrolithiasis Maternal Grandmother     Heart disease Maternal Grandmother     Leukemia Paternal Grandmother     Heart disease Paternal Grandmother     Lymphoma Paternal Grandmother     No Known Problems Sister     Cancer Paternal Grandfather         skin       Social History     Socioeconomic History    Marital status: Single   Tobacco Use    Smoking status: Former     Types: Electronic Cigarette    Smokeless tobacco: Never    Tobacco comments:     DENIED   Vaping Use    Vaping status: Former   Substance and Sexual Activity    Alcohol use: No      "Comment: DENIED    Drug use: Not Currently     Types: Marijuana     Comment: before pregnancy    Sexual activity: Yes     Partners: Male           Review of Systems   Constitutional:  Negative for chills, fatigue and fever.   HENT:  Negative for congestion, rhinorrhea and sore throat.    Eyes:  Negative for visual disturbance.   Respiratory: Negative.     Cardiovascular: Negative.    Gastrointestinal:  Negative for abdominal pain, constipation, diarrhea, nausea and vomiting.   Genitourinary:  Positive for vaginal discharge. Negative for difficulty urinating, dyspareunia, dysuria, flank pain, frequency, genital sores, hematuria, pelvic pain, urgency, vaginal bleeding and vaginal pain.   Neurological:  Negative for dizziness, seizures, light-headedness and headaches.   Psychiatric/Behavioral:  Negative for sleep disturbance. The patient is not nervous/anxious.           PHYSICAL EXAM:      VITAL SIGNS:  Vitals:    05/27/24 1334   BP: 116/76   BP Location: Right arm   Patient Position: Sitting   Resp: 18   Temp: 98.8 °F (37.1 °C)   TempSrc: Oral   Weight: 79.4 kg (175 lb)   Height: 160 cm (63\")        FHT'S:                   Baseline:  160 BPM  Variability:  Moderate = 6 - 25 BPM  Accelerations:  15 x 15 accelerations present     Decelerations:  absent  Contractions:   absent     Interpretation:    Reactive NST, CAT 1 tracing        PHYSICAL EXAM:    General: well developed; well nourished  no acute distress   Heart: Not performed.   Lungs  : breathing is unlabored     Abdomen: soft, non-tender; no masses  no umbilical or inguinal hernias are present  no hepato-splenomegaly       Cervix: was checked (by RN): 2 cm / 60 % / -2  Cervical Dilation (cm): 2-3  Cervical Effacement: 60%  Fetal Station: -2  Cervical Consistency: soft  Cervical Position: mid-position   Contractions: none        Extremities: peripheral pulses normal, no pedal edema, no clubbing or cyanosis      LABS AND TESTING ORDERED:  Uterine and fetal " "monitoring  Urinalysis  ROM plus    LAB RESULTS:    Recent Results (from the past 24 hour(s))   Urinalysis With Culture If Indicated - Urine, Clean Catch    Collection Time: 24  1:41 PM    Specimen: Urine, Clean Catch   Result Value Ref Range    Color, UA Yellow Yellow, Straw    Appearance, UA Clear Clear    pH, UA 7.0 5.0 - 8.0    Specific Gravity, UA 1.007 1.005 - 1.030    Glucose, UA Negative Negative    Ketones, UA Trace (A) Negative    Bilirubin, UA Negative Negative    Blood, UA Negative Negative    Protein, UA Negative Negative    Leuk Esterase, UA Negative Negative    Nitrite, UA Negative Negative    Urobilinogen, UA 1.0 E.U./dL 0.2 - 1.0 E.U./dL   Rapid Assay For ROM - Amniotic Fluid, Amniotic Sac    Collection Time: 24  1:41 PM    Specimen: Amniotic Sac; Amniotic Fluid   Result Value Ref Range    Rupture of Membranes Negative Negative       Lab Results   Component Value Date    ABO B 10/23/2023    RH Positive 10/23/2023       Lab Results   Component Value Date    STREPGPB Negative 05/15/2024                 Assessment & Plan     ASSESSMENT/PLAN:  Ana Enriquez is a 23 y.o. female  at 37w3d who presented with: concern for rupture of membranes.  ROM plus negative without obvious rupture on exam.  Cervix unchanged from prior exam.  Patient reassured and discharged home with return precautions given.          Final Impression:  Pregnancy at 37w3d  Reactive NST.  CAT 1 tracing  Maternal vital signs were reviewed and were unremarkable              Vitals:    24 1334   BP: 116/76   BP Location: Right arm   Patient Position: Sitting   Resp: 18   Temp: 98.8 °F (37.1 °C)   TempSrc: Oral   Weight: 79.4 kg (175 lb)   Height: 160 cm (63\")       Lab Results   Component Value Date    STREPGPB Negative 05/15/2024     Lab Results   Component Value Date    ABO B 10/23/2023    RH Positive 10/23/2023     COVID - 19 status unknown      PLAN:       I have spent 30 minutes including face to face time " with the patient, greater than 50% in discussion of the diagnosis (counseling) and/or coordination of care.     Anahi Soler MD  5/27/2024  14:25 EDT  OB Hospitalist  Phone:  [phone]

## 2024-05-28 LAB — BACTERIA SPEC AEROBE CULT: NORMAL

## 2024-05-29 ENCOUNTER — ROUTINE PRENATAL (OUTPATIENT)
Dept: OBSTETRICS AND GYNECOLOGY | Facility: CLINIC | Age: 24
End: 2024-05-29
Payer: COMMERCIAL

## 2024-05-29 VITALS — DIASTOLIC BLOOD PRESSURE: 81 MMHG | WEIGHT: 178 LBS | BODY MASS INDEX: 31.53 KG/M2 | SYSTOLIC BLOOD PRESSURE: 128 MMHG

## 2024-05-29 DIAGNOSIS — Z3A.37 37 WEEKS GESTATION OF PREGNANCY: Primary | ICD-10-CM

## 2024-05-29 DIAGNOSIS — O99.019 MATERNAL ANEMIA IN PREGNANCY, ANTEPARTUM: ICD-10-CM

## 2024-05-29 DIAGNOSIS — Z34.93 PRENATAL CARE, THIRD TRIMESTER: ICD-10-CM

## 2024-05-29 DIAGNOSIS — Z87.59 HISTORY OF PRETERM PREMATURE RUPTURE OF MEMBRANES (PPROM): ICD-10-CM

## 2024-05-29 DIAGNOSIS — O09.899 HISTORY OF PRETERM DELIVERY, CURRENTLY PREGNANT: ICD-10-CM

## 2024-05-29 DIAGNOSIS — O09.299 HX OF PREECLAMPSIA, PRIOR PREGNANCY, CURRENTLY PREGNANT: ICD-10-CM

## 2024-05-29 NOTE — PROGRESS NOTES
Chief Complaint   Patient presents with    Routine Prenatal Visit      Ana Enriquez is a 23 y.o.  at 37w5d who presents for routine prenatal visit. She reports that she recently went to the OB ED 2 days ago for leakage of fluid and was discharged home following negative testing for ROM. She reports that she was told she was 4 cm dilated. Denies vaginal bleeding, cramping, contractions, LOF. She reports active fetal movement.     /81   Wt 80.7 kg (178 lb)   LMP 2023   BMI 31.53 kg/m²    Gen: well appearing, NAD   Abd: gravid, nontender  SVE: 250/-2   See OB Flowsheet    ASSESSMENT:   IUP at 37w5d   Prenatal care in third trimester   History of preeclampsia in previous pregnancy  History of PPROM at 35 weeks with  delivery in subsequent pregnancy   Headaches affecting pregnancy - recommended magnesium supplementation per Neurology - doing well currently   Mild maternal anemia- Hgb 11.0, continue PNV    PLAN:  Problem list reviewed and updated.   Reviewed expectations of this stage of pregnancy.   Third trimester precautions reviewed including labor signs, monitoring fetal movements.   The patient has remained unchanged on cervical exam and is still 2 cm dilated at this time.   I did discuss the option of induction of labor at 39 weeks but the patient would rather proceed with expectant management.   Return in about 1 week (around 2024) for Prenatal visit.    Patient Active Problem List    Diagnosis Date Noted    Normal labor 2021    Severe pre-eclampsia, third trimester 2021     (normal spontaneous vaginal delivery) 2021    Swelling 2021    Pre-syncope 2021    Shortness of breath 10/07/2021    Right upper quadrant pain 10/07/2021    Chest pain 10/07/2021    Dizziness 10/07/2021    Positive urine drug screen:+THC. 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    Deedee Valencia MD

## 2024-06-05 ENCOUNTER — ANESTHESIA (OUTPATIENT)
Dept: LABOR AND DELIVERY | Facility: HOSPITAL | Age: 24
End: 2024-06-05
Payer: COMMERCIAL

## 2024-06-05 ENCOUNTER — HOSPITAL ENCOUNTER (INPATIENT)
Facility: HOSPITAL | Age: 24
LOS: 2 days | Discharge: HOME | End: 2024-06-07
Attending: STUDENT IN AN ORGANIZED HEALTH CARE EDUCATION/TRAINING PROGRAM | Admitting: OBSTETRICS & GYNECOLOGY
Payer: COMMERCIAL

## 2024-06-05 ENCOUNTER — ANESTHESIA EVENT (OUTPATIENT)
Dept: LABOR AND DELIVERY | Facility: HOSPITAL | Age: 24
End: 2024-06-05
Payer: COMMERCIAL

## 2024-06-05 PROBLEM — Z34.90 PREGNANCY: Status: ACTIVE | Noted: 2024-06-05

## 2024-06-05 LAB
ABO GROUP BLD: NORMAL
ALBUMIN SERPL-MCNC: 3.5 G/DL (ref 3.5–5.2)
ALBUMIN/GLOB SERPL: 1.5 G/DL
ALP SERPL-CCNC: 134 U/L (ref 39–117)
ALT SERPL W P-5'-P-CCNC: <5 U/L (ref 1–33)
AMPHET+METHAMPHET UR QL: NEGATIVE
ANION GAP SERPL CALCULATED.3IONS-SCNC: 12.2 MMOL/L (ref 5–15)
AST SERPL-CCNC: 16 U/L (ref 1–32)
BARBITURATES UR QL SCN: NEGATIVE
BASOPHILS # BLD AUTO: 0.04 10*3/MM3 (ref 0–0.2)
BASOPHILS NFR BLD AUTO: 0.3 % (ref 0–1.5)
BENZODIAZ UR QL SCN: NEGATIVE
BILIRUB SERPL-MCNC: 0.4 MG/DL (ref 0–1.2)
BLD GP AB SCN SERPL QL: NEGATIVE
BUN SERPL-MCNC: 2 MG/DL (ref 6–20)
BUN/CREAT SERPL: 4.4 (ref 7–25)
CALCIUM SPEC-SCNC: 8.8 MG/DL (ref 8.6–10.5)
CANNABINOIDS SERPL QL: NEGATIVE
CHLORIDE SERPL-SCNC: 105 MMOL/L (ref 98–107)
CO2 SERPL-SCNC: 19.8 MMOL/L (ref 22–29)
COCAINE UR QL: NEGATIVE
CREAT SERPL-MCNC: 0.45 MG/DL (ref 0.57–1)
DEPRECATED RDW RBC AUTO: 41.9 FL (ref 37–54)
EGFRCR SERPLBLD CKD-EPI 2021: 137.1 ML/MIN/1.73
EOSINOPHIL # BLD AUTO: 0.11 10*3/MM3 (ref 0–0.4)
EOSINOPHIL NFR BLD AUTO: 0.8 % (ref 0.3–6.2)
ERYTHROCYTE [DISTWIDTH] IN BLOOD BY AUTOMATED COUNT: 12.8 % (ref 12.3–15.4)
FENTANYL UR-MCNC: NEGATIVE NG/ML
GLOBULIN UR ELPH-MCNC: 2.4 GM/DL
GLUCOSE SERPL-MCNC: 86 MG/DL (ref 65–99)
HCT VFR BLD AUTO: 36.2 % (ref 34–46.6)
HGB BLD-MCNC: 11.9 G/DL (ref 12–15.9)
IMM GRANULOCYTES # BLD AUTO: 0.19 10*3/MM3 (ref 0–0.05)
IMM GRANULOCYTES NFR BLD AUTO: 1.4 % (ref 0–0.5)
LYMPHOCYTES # BLD AUTO: 2.4 10*3/MM3 (ref 0.7–3.1)
LYMPHOCYTES NFR BLD AUTO: 17.5 % (ref 19.6–45.3)
MCH RBC QN AUTO: 29.5 PG (ref 26.6–33)
MCHC RBC AUTO-ENTMCNC: 32.9 G/DL (ref 31.5–35.7)
MCV RBC AUTO: 89.8 FL (ref 79–97)
METHADONE UR QL SCN: NEGATIVE
MONOCYTES # BLD AUTO: 1.32 10*3/MM3 (ref 0.1–0.9)
MONOCYTES NFR BLD AUTO: 9.6 % (ref 5–12)
NEUTROPHILS NFR BLD AUTO: 70.4 % (ref 42.7–76)
NEUTROPHILS NFR BLD AUTO: 9.65 10*3/MM3 (ref 1.7–7)
NRBC BLD AUTO-RTO: 0 /100 WBC (ref 0–0.2)
OPIATES UR QL: NEGATIVE
OXYCODONE UR QL SCN: NEGATIVE
PLATELET # BLD AUTO: 230 10*3/MM3 (ref 140–450)
PMV BLD AUTO: 11.9 FL (ref 6–12)
POTASSIUM SERPL-SCNC: 3.5 MMOL/L (ref 3.5–5.2)
PROT SERPL-MCNC: 5.9 G/DL (ref 6–8.5)
RBC # BLD AUTO: 4.03 10*6/MM3 (ref 3.77–5.28)
RH BLD: POSITIVE
SODIUM SERPL-SCNC: 137 MMOL/L (ref 136–145)
T PALLIDUM IGG SER QL: NORMAL
T&S EXPIRATION DATE: NORMAL
WBC NRBC COR # BLD AUTO: 13.71 10*3/MM3 (ref 3.4–10.8)

## 2024-06-05 PROCEDURE — 86850 RBC ANTIBODY SCREEN: CPT | Performed by: OBSTETRICS & GYNECOLOGY

## 2024-06-05 PROCEDURE — 80307 DRUG TEST PRSMV CHEM ANLYZR: CPT | Performed by: OBSTETRICS & GYNECOLOGY

## 2024-06-05 PROCEDURE — S0260 H&P FOR SURGERY: HCPCS | Performed by: OBSTETRICS & GYNECOLOGY

## 2024-06-05 PROCEDURE — 86780 TREPONEMA PALLIDUM: CPT | Performed by: OBSTETRICS & GYNECOLOGY

## 2024-06-05 PROCEDURE — 80053 COMPREHEN METABOLIC PANEL: CPT | Performed by: OBSTETRICS & GYNECOLOGY

## 2024-06-05 PROCEDURE — 99202 OFFICE O/P NEW SF 15 MIN: CPT | Performed by: OBSTETRICS & GYNECOLOGY

## 2024-06-05 PROCEDURE — 86900 BLOOD TYPING SEROLOGIC ABO: CPT | Performed by: OBSTETRICS & GYNECOLOGY

## 2024-06-05 PROCEDURE — 25810000003 LACTATED RINGERS PER 1000 ML: Performed by: OBSTETRICS & GYNECOLOGY

## 2024-06-05 PROCEDURE — 85025 COMPLETE CBC W/AUTO DIFF WBC: CPT | Performed by: OBSTETRICS & GYNECOLOGY

## 2024-06-05 PROCEDURE — 25010000002 ROPIVACAINE PER 1 MG: Performed by: STUDENT IN AN ORGANIZED HEALTH CARE EDUCATION/TRAINING PROGRAM

## 2024-06-05 PROCEDURE — C1755 CATHETER, INTRASPINAL: HCPCS | Performed by: STUDENT IN AN ORGANIZED HEALTH CARE EDUCATION/TRAINING PROGRAM

## 2024-06-05 PROCEDURE — 86901 BLOOD TYPING SEROLOGIC RH(D): CPT | Performed by: OBSTETRICS & GYNECOLOGY

## 2024-06-05 PROCEDURE — 59410 OBSTETRICAL CARE: CPT | Performed by: OBSTETRICS & GYNECOLOGY

## 2024-06-05 PROCEDURE — 25810000003 LACTATED RINGERS SOLUTION: Performed by: OBSTETRICS & GYNECOLOGY

## 2024-06-05 RX ORDER — IBUPROFEN 600 MG/1
600 TABLET ORAL EVERY 6 HOURS PRN
Status: DISCONTINUED | OUTPATIENT
Start: 2024-06-05 | End: 2024-06-07 | Stop reason: HOSPADM

## 2024-06-05 RX ORDER — EPHEDRINE SULFATE 50 MG/ML
5 INJECTION, SOLUTION INTRAVENOUS
Status: DISCONTINUED | OUTPATIENT
Start: 2024-06-05 | End: 2024-06-05 | Stop reason: HOSPADM

## 2024-06-05 RX ORDER — DIPHENHYDRAMINE HYDROCHLORIDE 50 MG/ML
12.5 INJECTION INTRAMUSCULAR; INTRAVENOUS EVERY 8 HOURS PRN
Status: DISCONTINUED | OUTPATIENT
Start: 2024-06-05 | End: 2024-06-05 | Stop reason: HOSPADM

## 2024-06-05 RX ORDER — TEMAZEPAM 7.5 MG/1
7.5 CAPSULE ORAL NIGHTLY PRN
Status: DISCONTINUED | OUTPATIENT
Start: 2024-06-05 | End: 2024-06-07 | Stop reason: HOSPADM

## 2024-06-05 RX ORDER — ACETAMINOPHEN 325 MG/1
650 TABLET ORAL EVERY 4 HOURS PRN
Status: DISCONTINUED | OUTPATIENT
Start: 2024-06-05 | End: 2024-06-05 | Stop reason: HOSPADM

## 2024-06-05 RX ORDER — OXYTOCIN/0.9 % SODIUM CHLORIDE 30/500 ML
999 PLASTIC BAG, INJECTION (ML) INTRAVENOUS ONCE
Status: COMPLETED | OUTPATIENT
Start: 2024-06-05 | End: 2024-06-05

## 2024-06-05 RX ORDER — ONDANSETRON 2 MG/ML
4 INJECTION INTRAMUSCULAR; INTRAVENOUS EVERY 6 HOURS PRN
Status: DISCONTINUED | OUTPATIENT
Start: 2024-06-05 | End: 2024-06-05 | Stop reason: HOSPADM

## 2024-06-05 RX ORDER — MISOPROSTOL 200 UG/1
800 TABLET ORAL ONCE AS NEEDED
Status: DISCONTINUED | OUTPATIENT
Start: 2024-06-05 | End: 2024-06-05 | Stop reason: HOSPADM

## 2024-06-05 RX ORDER — TRAMADOL HYDROCHLORIDE 50 MG/1
50 TABLET ORAL EVERY 6 HOURS PRN
Status: DISCONTINUED | OUTPATIENT
Start: 2024-06-05 | End: 2024-06-07 | Stop reason: HOSPADM

## 2024-06-05 RX ORDER — ONDANSETRON 2 MG/ML
4 INJECTION INTRAMUSCULAR; INTRAVENOUS ONCE AS NEEDED
Status: DISCONTINUED | OUTPATIENT
Start: 2024-06-05 | End: 2024-06-05 | Stop reason: HOSPADM

## 2024-06-05 RX ORDER — ERYTHROMYCIN 5 MG/G
OINTMENT OPHTHALMIC
Status: ACTIVE
Start: 2024-06-05 | End: 2024-06-05

## 2024-06-05 RX ORDER — DOCUSATE SODIUM 100 MG/1
100 CAPSULE, LIQUID FILLED ORAL 2 TIMES DAILY
Status: DISCONTINUED | OUTPATIENT
Start: 2024-06-05 | End: 2024-06-07 | Stop reason: HOSPADM

## 2024-06-05 RX ORDER — HYDROCORTISONE 25 MG/G
CREAM TOPICAL AS NEEDED
Status: DISCONTINUED | OUTPATIENT
Start: 2024-06-05 | End: 2024-06-07 | Stop reason: HOSPADM

## 2024-06-05 RX ORDER — TERBUTALINE SULFATE 1 MG/ML
0.25 INJECTION, SOLUTION SUBCUTANEOUS AS NEEDED
Status: DISCONTINUED | OUTPATIENT
Start: 2024-06-05 | End: 2024-06-05 | Stop reason: HOSPADM

## 2024-06-05 RX ORDER — LIDOCAINE HCL/EPINEPHRINE/PF 2%-1:200K
VIAL (ML) INJECTION AS NEEDED
Status: DISCONTINUED | OUTPATIENT
Start: 2024-06-05 | End: 2024-06-05 | Stop reason: SURG

## 2024-06-05 RX ORDER — CALCIUM CARBONATE 500 MG/1
2 TABLET, CHEWABLE ORAL 3 TIMES DAILY PRN
Status: DISCONTINUED | OUTPATIENT
Start: 2024-06-05 | End: 2024-06-07 | Stop reason: HOSPADM

## 2024-06-05 RX ORDER — METHYLERGONOVINE MALEATE 0.2 MG/ML
200 INJECTION INTRAVENOUS ONCE AS NEEDED
Status: DISCONTINUED | OUTPATIENT
Start: 2024-06-05 | End: 2024-06-05 | Stop reason: HOSPADM

## 2024-06-05 RX ORDER — ONDANSETRON 4 MG/1
4 TABLET, ORALLY DISINTEGRATING ORAL EVERY 6 HOURS PRN
Status: DISCONTINUED | OUTPATIENT
Start: 2024-06-05 | End: 2024-06-05 | Stop reason: HOSPADM

## 2024-06-05 RX ORDER — MAGNESIUM CARB/ALUMINUM HYDROX 105-160MG
30 TABLET,CHEWABLE ORAL ONCE AS NEEDED
Status: DISCONTINUED | OUTPATIENT
Start: 2024-06-05 | End: 2024-06-05 | Stop reason: HOSPADM

## 2024-06-05 RX ORDER — FENTANYL/ROPIVACAINE/NS/PF 2MCG/ML-.2
10 PLASTIC BAG, INJECTION (ML) INJECTION CONTINUOUS
Status: DISCONTINUED | OUTPATIENT
Start: 2024-06-05 | End: 2024-06-07 | Stop reason: HOSPADM

## 2024-06-05 RX ORDER — SODIUM CHLORIDE 0.9 % (FLUSH) 0.9 %
1-10 SYRINGE (ML) INJECTION AS NEEDED
Status: DISCONTINUED | OUTPATIENT
Start: 2024-06-05 | End: 2024-06-07 | Stop reason: HOSPADM

## 2024-06-05 RX ORDER — OXYTOCIN/0.9 % SODIUM CHLORIDE 30/500 ML
250 PLASTIC BAG, INJECTION (ML) INTRAVENOUS CONTINUOUS
Status: DISPENSED | OUTPATIENT
Start: 2024-06-05 | End: 2024-06-05

## 2024-06-05 RX ORDER — SODIUM CHLORIDE 9 MG/ML
40 INJECTION, SOLUTION INTRAVENOUS AS NEEDED
Status: DISCONTINUED | OUTPATIENT
Start: 2024-06-05 | End: 2024-06-05 | Stop reason: HOSPADM

## 2024-06-05 RX ORDER — FAMOTIDINE 10 MG/ML
20 INJECTION, SOLUTION INTRAVENOUS 2 TIMES DAILY PRN
Status: DISCONTINUED | OUTPATIENT
Start: 2024-06-05 | End: 2024-06-05 | Stop reason: HOSPADM

## 2024-06-05 RX ORDER — TRANEXAMIC ACID 10 MG/ML
1000 INJECTION, SOLUTION INTRAVENOUS ONCE AS NEEDED
Status: DISCONTINUED | OUTPATIENT
Start: 2024-06-05 | End: 2024-06-07 | Stop reason: HOSPADM

## 2024-06-05 RX ORDER — LIDOCAINE HYDROCHLORIDE 10 MG/ML
0.5 INJECTION, SOLUTION INFILTRATION; PERINEURAL ONCE AS NEEDED
Status: DISCONTINUED | OUTPATIENT
Start: 2024-06-05 | End: 2024-06-05 | Stop reason: HOSPADM

## 2024-06-05 RX ORDER — ONDANSETRON 4 MG/1
4 TABLET, ORALLY DISINTEGRATING ORAL EVERY 8 HOURS PRN
Status: DISCONTINUED | OUTPATIENT
Start: 2024-06-05 | End: 2024-06-07 | Stop reason: HOSPADM

## 2024-06-05 RX ORDER — ROPIVACAINE HYDROCHLORIDE 2 MG/ML
INJECTION, SOLUTION EPIDURAL; INFILTRATION; PERINEURAL AS NEEDED
Status: DISCONTINUED | OUTPATIENT
Start: 2024-06-05 | End: 2024-06-05 | Stop reason: SURG

## 2024-06-05 RX ORDER — FAMOTIDINE 20 MG/1
20 TABLET, FILM COATED ORAL 2 TIMES DAILY PRN
Status: DISCONTINUED | OUTPATIENT
Start: 2024-06-05 | End: 2024-06-05 | Stop reason: HOSPADM

## 2024-06-05 RX ORDER — PRENATAL VIT/IRON FUM/FOLIC AC 27MG-0.8MG
1 TABLET ORAL DAILY
Status: DISCONTINUED | OUTPATIENT
Start: 2024-06-05 | End: 2024-06-07 | Stop reason: HOSPADM

## 2024-06-05 RX ORDER — OXYTOCIN/0.9 % SODIUM CHLORIDE 30/500 ML
125 PLASTIC BAG, INJECTION (ML) INTRAVENOUS ONCE AS NEEDED
Status: COMPLETED | OUTPATIENT
Start: 2024-06-05 | End: 2024-06-05

## 2024-06-05 RX ORDER — PHYTONADIONE 1 MG/.5ML
INJECTION, EMULSION INTRAMUSCULAR; INTRAVENOUS; SUBCUTANEOUS
Status: ACTIVE
Start: 2024-06-05 | End: 2024-06-05

## 2024-06-05 RX ORDER — ONDANSETRON 2 MG/ML
4 INJECTION INTRAMUSCULAR; INTRAVENOUS EVERY 6 HOURS PRN
Status: DISCONTINUED | OUTPATIENT
Start: 2024-06-05 | End: 2024-06-07 | Stop reason: HOSPADM

## 2024-06-05 RX ORDER — ACETAMINOPHEN 325 MG/1
650 TABLET ORAL EVERY 6 HOURS PRN
Status: DISCONTINUED | OUTPATIENT
Start: 2024-06-05 | End: 2024-06-07 | Stop reason: HOSPADM

## 2024-06-05 RX ORDER — SODIUM CHLORIDE, SODIUM LACTATE, POTASSIUM CHLORIDE, CALCIUM CHLORIDE 600; 310; 30; 20 MG/100ML; MG/100ML; MG/100ML; MG/100ML
125 INJECTION, SOLUTION INTRAVENOUS CONTINUOUS
Status: DISCONTINUED | OUTPATIENT
Start: 2024-06-05 | End: 2024-06-07 | Stop reason: HOSPADM

## 2024-06-05 RX ORDER — SODIUM CHLORIDE 0.9 % (FLUSH) 0.9 %
10 SYRINGE (ML) INJECTION EVERY 12 HOURS SCHEDULED
Status: DISCONTINUED | OUTPATIENT
Start: 2024-06-05 | End: 2024-06-05 | Stop reason: HOSPADM

## 2024-06-05 RX ORDER — FAMOTIDINE 10 MG/ML
20 INJECTION, SOLUTION INTRAVENOUS ONCE AS NEEDED
Status: DISCONTINUED | OUTPATIENT
Start: 2024-06-05 | End: 2024-06-05 | Stop reason: HOSPADM

## 2024-06-05 RX ORDER — CARBOPROST TROMETHAMINE 250 UG/ML
250 INJECTION, SOLUTION INTRAMUSCULAR
Status: DISCONTINUED | OUTPATIENT
Start: 2024-06-05 | End: 2024-06-05 | Stop reason: HOSPADM

## 2024-06-05 RX ORDER — BISACODYL 10 MG
10 SUPPOSITORY, RECTAL RECTAL DAILY PRN
Status: DISCONTINUED | OUTPATIENT
Start: 2024-06-06 | End: 2024-06-07 | Stop reason: HOSPADM

## 2024-06-05 RX ORDER — SODIUM CHLORIDE 0.9 % (FLUSH) 0.9 %
10 SYRINGE (ML) INJECTION AS NEEDED
Status: DISCONTINUED | OUTPATIENT
Start: 2024-06-05 | End: 2024-06-05 | Stop reason: HOSPADM

## 2024-06-05 RX ADMIN — SODIUM CHLORIDE, POTASSIUM CHLORIDE, SODIUM LACTATE AND CALCIUM CHLORIDE 125 ML/HR: 600; 310; 30; 20 INJECTION, SOLUTION INTRAVENOUS at 09:57

## 2024-06-05 RX ADMIN — Medication: at 16:16

## 2024-06-05 RX ADMIN — Medication 125 ML/HR: at 11:38

## 2024-06-05 RX ADMIN — DOCUSATE SODIUM 100 MG: 100 CAPSULE, LIQUID FILLED ORAL at 20:23

## 2024-06-05 RX ADMIN — ROPIVACAINE HYDROCHLORIDE 6 ML: 2 INJECTION, SOLUTION EPIDURAL; INFILTRATION at 07:15

## 2024-06-05 RX ADMIN — Medication 999 ML/HR: at 11:03

## 2024-06-05 RX ADMIN — LIDOCAINE HYDROCHLORIDE AND EPINEPHRINE 3 ML: 20; 5 INJECTION, SOLUTION EPIDURAL; INFILTRATION; INTRACAUDAL; PERINEURAL at 07:11

## 2024-06-05 RX ADMIN — IBUPROFEN 600 MG: 600 TABLET, FILM COATED ORAL at 11:38

## 2024-06-05 RX ADMIN — SODIUM CHLORIDE, POTASSIUM CHLORIDE, SODIUM LACTATE AND CALCIUM CHLORIDE 1000 ML: 600; 310; 30; 20 INJECTION, SOLUTION INTRAVENOUS at 06:00

## 2024-06-05 RX ADMIN — ACETAMINOPHEN 325MG 650 MG: 325 TABLET ORAL at 18:53

## 2024-06-05 RX ADMIN — Medication 10 ML/HR: at 07:15

## 2024-06-05 RX ADMIN — SODIUM CHLORIDE, POTASSIUM CHLORIDE, SODIUM LACTATE AND CALCIUM CHLORIDE 999 ML/HR: 600; 310; 30; 20 INJECTION, SOLUTION INTRAVENOUS at 06:56

## 2024-06-05 NOTE — NURSING NOTE
Patient assisted to bathroom due to uterine displacement to the right on fundal assessment. Patient ambulated without difficulty, voided 400cc urine. No clots noted. Pericare education provided. Patient ambulated back to bed independently.

## 2024-06-05 NOTE — PLAN OF CARE
Goal Outcome Evaluation:  Plan of Care Reviewed With: patient, spouse        Progress: no change  Outcome Evaluation: New Admit for labor, GBS Neg

## 2024-06-05 NOTE — OBED NOTES
JOHNATHON Note [facility]    Patient Name: Ana Enriquez  YOB: 2000  MRN: [id number]  Admission Date: 2024  5:20 AM  Date of Service: 2024    Chief Complaint: Contractions (Contractions that started at 0100am this AM)        Subjective     Ana Enriquez is a 23 y.o. female  at 38w5d with Estimated Date of Delivery: 24 who presents with the chief complaint listed above.  Patient is ranking contraction pain a 5 out of 10 and desires an epidural.  Review of office records shows that she was 2 cm and she is now 4 to 5 cm.  Group B beta strep is negative     She sees Deedee Valencia MD for her prenatal care. Her pregnancy has been complicated by: History of severe preeclampsia, history of  delivery, history of herpes, history of self mutilating behavior cutting, history of polyphagia, bipolar, history of seizures, history of panic attacks    She describes fetal movement as normal.  She denies rupture of membranes.  She denies vaginal bleeding. She is feeling contractions.        Objective   Patient Active Problem List    Diagnosis     Normal labor [O80, Z37.9]     Severe pre-eclampsia, third trimester [O14.13]      (normal spontaneous vaginal delivery) [O80]     Swelling [R60.9]     Pre-syncope [R55]     Shortness of breath [R06.02]     Right upper quadrant pain [R10.11]     Chest pain [R07.9]     Dizziness [R42, R55]     Positive urine drug screen:+THC. Needs = [R82.5]     h/o ectopic pregnancy [O00.80]     Nausea and vomiting during pregnancy prior to 22 weeks gestation [O21.9]     Herpes labialis [B00.1]     Self mutilating behavior [Z72.89]     Polyphagia [R63.2]     High risk medication use [Z79.899]     Headache [R51.9]     Anxiety [F41.9]     Abnormal finding on thyroid function test [R94.6]     Bipolar I disorder, single manic episode [F30.9]     New onset seizure [R56.9]     Panic disorder without agoraphobia [F41.0]         OB History    Para Term   AB Living   3 1 0 1 1 1   SAB IAB Ectopic Molar Multiple Live Births   0 0 1 0 0 1      # Outcome Date GA Lbr Leonidas/2nd Weight Sex Type Anes PTL Lv   3 Current            2  21 36w5d 10:14 / 01:07 2660 g (5 lb 13.8 oz) F Vag-Spont EPI Y KAMILAH      Name: LUIS A SANTOS      Apgar1: 8  Apgar5: 9   1 Ectopic               Obstetric Comments    at 35 weeks due to severe preeclampsia.  5 lbs approx.        Past Medical History:   Diagnosis Date    Cannabis abuse     Ectopic pregnancy     Preeclampsia     with 1st pregnancy    Seizure in childhood     Self-mutilation     cutting       Past Surgical History:   Procedure Laterality Date    WISDOM TOOTH EXTRACTION         No current facility-administered medications on file prior to encounter.     Current Outpatient Medications on File Prior to Encounter   Medication Sig Dispense Refill    Prenatal Vit-Fe Fumarate-FA (Prenatal/Folic Acid) tablet Take 1 tablet by mouth Daily. 30 each 10    Sodium Fluoride 5000 Sensitive 1.1-5 % gel       valACYclovir (VALTREX) 1000 MG tablet Take 2 tablets by mouth.         Allergies   Allergen Reactions    Penicillins Hives       Family History   Problem Relation Age of Onset    Nephrolithiasis Mother     Other Maternal Grandmother         fibrocystic disease of breast    Nephrolithiasis Maternal Grandmother     Heart disease Maternal Grandmother     Leukemia Paternal Grandmother     Heart disease Paternal Grandmother     Lymphoma Paternal Grandmother     No Known Problems Sister     Cancer Paternal Grandfather         skin       Social History     Socioeconomic History    Marital status: Single   Tobacco Use    Smoking status: Former     Types: Electronic Cigarette    Smokeless tobacco: Never    Tobacco comments:     DENIED   Vaping Use    Vaping status: Former   Substance and Sexual Activity    Alcohol use: No     Comment: DENIED    Drug use: Not Currently     Types: Marijuana     Comment: before pregnancy    Sexual  "activity: Yes     Partners: Male           Review of Systems   Constitutional:  Negative for chills and fever.   HENT: Negative.     Eyes:  Negative for photophobia and visual disturbance.   Respiratory:  Negative for shortness of breath.    Cardiovascular:  Negative for chest pain.   Gastrointestinal:  Positive for abdominal pain. Negative for nausea.   Psychiatric/Behavioral:  The patient is not nervous/anxious.           PHYSICAL EXAM:      VITAL SIGNS:  Vitals:    06/05/24 0539   Resp: 16   Temp: 98.3 °F (36.8 °C)   TempSrc: Oral   Weight: 80.7 kg (178 lb)   Height: 160 cm (63\")            FHT'S:   150 with moderate variability and accelerations                                     PHYSICAL EXAM:      General: well developed; well nourished  no acute distress   Heart: Not performed.   Lungs   breathing is unlabored   Abdomen: Gravid and non tender     Extremities: trace edema, DTRs 1 plus, no clonus       Cervix: Per RN  Cervical Dilation (cm): 4-5  Cervical Effacement: %  Fetal Station: -1  Cervical Consistency: soft  Cervical Position: mid-position     Contractions:   regular                    LABS AND TESTING ORDERED:  Uterine and fetal monitoring  Urinalysis  Admit labs    LAB RESULTS:    No results found for this or any previous visit (from the past 24 hour(s)).    Lab Results   Component Value Date    ABO B 10/23/2023    RH Positive 10/23/2023       Lab Results   Component Value Date    STREPGPB Negative 05/15/2024                 External Prenatal Results       Pregnancy Outside Results - Transcribed From Office Records - See Scanned Records For Details       Test Value Date Time    ABO  B  10/23/23 1056    Rh  Positive  10/23/23 1056    Antibody Screen  Negative  10/23/23 1056    Varicella IgG       Rubella  2.05 index 10/23/23 1056    Hgb  11.0 g/dL 03/20/24 1029       11.6 g/dL 02/20/24 1418       14.6 g/dL 10/23/23 1056    Hct  32.6 % 03/20/24 1029       34.8 % 02/20/24 1418       42.2 % " 10/23/23 1056    HgB A1c        1h GTT  114 mg/dL 24 1029    3h GTT Fasting       3h GTT 1 hour       3h GTT 2 hour       3h GTT 3 hour        Gonorrhea (discrete)  Negative  10/23/23 1104    Chlamydia (discrete)  Negative  10/23/23 1104    RPR  Non Reactive  10/23/23 1056    Syphilis Antibody  Non Reactive  24 1029    HBsAg  Negative  10/23/23 1056    Herpes Simplex Virus PCR       Herpes Simplex VIrus Culture       HIV  Non Reactive  10/23/23 1056    Hep C RNA Quant PCR       Hep C Antibody  Non Reactive  10/23/23 1056    AFP       NIPT       Cystic Fibroisis  ^ Negative  21     Group B Strep  Negative  05/15/24 1129    GBS Susceptibility to Clindamycin       GBS Susceptibility to Erythromycin       Fetal Fibronectin       Genetic Testing, Maternal Blood                 Drug Screening       Test Value Date Time    Urine Drug Screen       Amphetamine Screen  Negative ng/mL 10/23/23 1131    Barbiturate Screen  Negative ng/mL 10/23/23 1131    Benzodiazepine Screen  Negative ng/mL 10/23/23 1131    Methadone Screen  Negative ng/mL 10/23/23 1131    Phencyclidine Screen  Negative ng/mL 10/23/23 1131    Opiates Screen       THC Screen       Cocaine Screen       Propoxyphene Screen  Negative ng/mL 10/23/23 1131    Buprenorphine Screen       Methamphetamine Screen       Oxycodone Screen       Tricyclic Antidepressants Screen                 Legend    ^: Historical                              Impression:   @ 38w5d .  Final Diagnosis: Active labor    Plan:  1.  Discussed with  who will admit, fetal and uterine monitoring  continuously, expectant management, and analgesia with  epidural        Zee Ball MD  2024  05:59 EDT

## 2024-06-05 NOTE — L&D DELIVERY NOTE
DELIVERY REPORT    [facility]  Patient: Ana Enriquez  : 2000  Age: 23 y.o.  Unit Number: [id number]    DATE OF DELIVERY: 2024    PREOPERATIVE DIAGNOSIS:  1)  Intrauterine Pregnancy at 38 weeks.  2)  Spontaneous Labor.  3)  History of  Birth.  4)  History of Pre-eclampsia in previous pregnancy.    POSTOPERATIVE DIAGNOSIS:  same as pre-op diagnosis    PROCEDURE PERFORMED:   Spontaneous Vaginal Delivery    SURGEON: Randell Peacock MD    ASSISTANT:   None    ANESTHESIA:   Epidural    QUANTITATIVE BLOOD LOSS:   170 ml    FINDINGS:     Live Viable Female Infant // Weight  7 lbs  1 oz //  Apgar 1 minute 8 and Apgar 5 minutes 9             Normal placenta and cord                        No vaginal or perineal laceration    DESCRIPTION OF PROCEDURE: Patient is a 23 year old  admitted at 38+ weeks in active labor.  Prenatal care complicated by previous pre-eclampsia history and  delivery.  Patient received an epidural for pain control and fetal status was reassuring.  She underwent amniotomy at 7 cm with clear fluid noted.  Patient progressed to the second stage of labor.  Pushing effort was excellent.  Delivery team was assembled.  Perineum was prepped with betadine.  With continued pushing, patient delivered a live viable female infant in the occiput anterior position.  Nose and mouth were bulb suctioned upon presentation of the head.  With continued maternal effort, the right anterior shoulder, torso and body were delivered without difficulty.  Infant was vigorous, delayed cord clamping occurred, the cord was cut and infant was presented to parents.  Cord blood was obtained and placenta was delivered spontaneously intact with 3 vessel cord noted.  No lacerations noted.  Pitocin was given for prevention of uterine atony.    COMPLICATIONS: None  SPECIMEN:  Cord blood  DISPOSITION:  Mother and baby remained in LDR in stable condition       Randell Peacock  MD  Completed: 6/5/2024

## 2024-06-05 NOTE — ANESTHESIA PREPROCEDURE EVALUATION
Anesthesia Evaluation     Patient summary reviewed and Nursing notes reviewed                Airway   Mallampati: II  TM distance: >3 FB  Neck ROM: full  Dental      Pulmonary    Cardiovascular     (+) hypertension      Neuro/Psych  (+) seizures (childhood), headaches  GI/Hepatic/Renal/Endo - negative ROS     Musculoskeletal (-) negative ROS    Abdominal    Substance History - negative use     OB/GYN    (+) Pregnant        Other - negative ROS                         Anesthesia Plan    ASA 2     epidural     (38w5d)    Anesthetic plan, risks, benefits, and alternatives have been provided, discussed and informed consent has been obtained with: patient.        CODE STATUS:    Level Of Support Discussed With: Patient  Code Status (Patient has no pulse and is not breathing): CPR (Attempt to Resuscitate)  Medical Interventions (Patient has pulse or is breathing): Full Support

## 2024-06-05 NOTE — H&P
H&P Note    Patient Identification:  Name: Ana Enriquez  Age: 23 y.o.  Sex: female  :  2000  MRN: [id number]                       Chief Complaint: Contractions    History of Present Illness:   09-lopo-[id number] 3 para 1 presented at 38-5/7 weeks with painful contractions.  She was observed on OB ED and noted to be in active labor with cervical dilation which had changed from 2 cm to 4-1/2 cm.  Fetal heart tones were reactive.  Group B strep status is negative.    Problem List:  [unfilled]  Past Medical History:  Past Medical History:   Diagnosis Date    Cannabis abuse     Ectopic pregnancy     Preeclampsia     with 1st pregnancy    Seizure in childhood     Self-mutilation     cutting     Past Surgical History:  Past Surgical History:   Procedure Laterality Date    WISDOM TOOTH EXTRACTION        Home Meds:  Medications Prior to Admission   Medication Sig Dispense Refill Last Dose    Prenatal Vit-Fe Fumarate-FA (Prenatal/Folic Acid) tablet Take 1 tablet by mouth Daily. 30 each 10     Sodium Fluoride 5000 Sensitive 1.1-5 % gel        valACYclovir (VALTREX) 1000 MG tablet Take 2 tablets by mouth.        Current Meds:   [unfilled]  Allergies:  Allergies   Allergen Reactions    Penicillins Hives     Immunizations:  Immunization History   Administered Date(s) Administered    COVID-19 (PFIZER) Purple Cap Monovalent 2021, 2021    MMR 2021    Tdap 2017, 2021     Social History:   Social History     Tobacco Use    Smoking status: Former     Types: Electronic Cigarette    Smokeless tobacco: Never    Tobacco comments:     DENIED   Substance Use Topics    Alcohol use: No     Comment: DENIED      Family History:  Family History   Problem Relation Age of Onset    Nephrolithiasis Mother     Other Maternal Grandmother         fibrocystic disease of breast    Nephrolithiasis Maternal Grandmother     Heart disease Maternal Grandmother     Leukemia Paternal Grandmother     Heart disease Paternal  Grandmother     Lymphoma Paternal Grandmother     No Known Problems Sister     Cancer Paternal Grandfather         skin        Review of Systems  A comprehensive review of systems was negative.    Objective:  tMax 24 hrs: Temp (24hrs), Av.2 °F (36.8 °C), Min:98.1 °F (36.7 °C), Max:98.3 °F (36.8 °C)    Vitals Ranges:   Temp:  [98.1 °F (36.7 °C)-98.3 °F (36.8 °C)] 98.1 °F (36.7 °C)  Heart Rate:  [78-96] 90  Resp:  [16] 16  BP: ()/(53-84) 117/63  Intake and Output Last 3 Shifts:   No intake/output data recorded.    Exam:     General Appearance:    Alert, cooperative, no distress, appears stated age   Head:    Normocephalic, without obvious abnormality, atraumatic   Back:     Symmetric, no curvature, ROM normal, no CVA tenderness   Lungs:     Clear to auscultation bilaterally, respirations unlabored   Chest Wall:    No tenderness or deformity    Heart:    Regular rate and rhythm, S1 and S2 normal, no murmur, rub   or gallop       Abdomen:   Cervix is completely effaced and 7 cm dilated.  Amniotomy performed with return of clear fluid.   Genitalia:  The abdomen is soft and gravid.   Rectal:  Not performed today   Extremities: Equal in size   Skin:   Skin color, texture, turgor normal, no rashes or lesions   Lymph nodes:   Cervical, supraclavicular, and axillary nodes normal       Data Review:  Reactive nonstress test  Lab Results (last 24 hours)       Procedure Component Value Units Date/Time    URINE DRUG SCREEN PLUS BUPRENORPHINE - [[id number]] Collected: 24     Updated: 24 0853    Narrative:      The following orders were created for panel order URINE DRUG SCREEN PLUS BUPRENORPHINE -.  Procedure                               Abnormality         Status                     ---------                               -----------         ------                     Urine Drug Screen - Urin...[[id number]]  Normal              Final result               Buprenorphine Screen Uri...[[id number]]                       In process                   Please view results for these tests on the individual orders.    Urine Drug Screen - Urine, Clean Catch [[id number]]  (Normal) Collected: 06/05/24 0810    Specimen: Urine, Clean Catch Updated: 06/05/24 0853     Amphet/Methamphet, Screen Negative     Barbiturates Screen, Urine Negative     Benzodiazepine Screen, Urine Negative     Cocaine Screen, Urine Negative     Opiate Screen Negative     THC, Screen, Urine Negative     Methadone Screen, Urine Negative     Oxycodone Screen, Urine Negative     Fentanyl, Urine Negative    Narrative:      Negative Thresholds Per Drugs Screened:    Amphetamines                 500 ng/ml  Barbiturates                 200 ng/ml  Benzodiazepines              100 ng/ml  Cocaine                      300 ng/ml  Methadone                    300 ng/ml  Opiates                      300 ng/ml  Oxycodone                    100 ng/ml  THC                           50 ng/ml  Fentanyl                       5 ng/ml      The Normal Value for all drugs tested is negative. This report includes final unconfirmed screening results to be used for medical treatment purposes only. Unconfirmed results must not be used for non-medical purposes such as employment or legal testing. Clinical consideration should be applied to any drug of abuse test, particularly when unconfirmed results are used.            Buprenorphine Screen Urine - Urine, Clean Catch [[id number]] Collected: 06/05/24 0810    Specimen: Urine, Clean Catch Updated: 06/05/24 0821    Comprehensive Metabolic Panel [[id number]]  (Abnormal) Collected: 06/05/24 0600    Specimen: Blood Updated: 06/05/24 0653     Glucose 86 mg/dL      BUN 2 mg/dL      Creatinine 0.45 mg/dL      Sodium 137 mmol/L      Potassium 3.5 mmol/L      Chloride 105 mmol/L      CO2 19.8 mmol/L      Calcium 8.8 mg/dL      Total Protein 5.9 g/dL      Albumin 3.5 g/dL      ALT (SGPT) <5 U/L      AST (SGOT) 16 U/L      Alkaline Phosphatase 134 U/L       Total Bilirubin 0.4 mg/dL      Globulin 2.4 gm/dL      A/G Ratio 1.5 g/dL      BUN/Creatinine Ratio 4.4     Anion Gap 12.2 mmol/L      eGFR 137.1 mL/min/1.73     Narrative:      GFR Normal >60  Chronic Kidney Disease <60  Kidney Failure <15      T Pallidum Antibody w/ reflex RPR (Syphilis) [[id number]]  (Normal) Collected: 06/05/24 0600    Specimen: Blood Updated: 06/05/24 0650     Treponemal AB Total Non-Reactive    CBC & Differential [[id number]]  (Abnormal) Collected: 06/05/24 0600    Specimen: Blood Updated: 06/05/24 0625    Narrative:      The following orders were created for panel order CBC & Differential.  Procedure                               Abnormality         Status                     ---------                               -----------         ------                     CBC Auto Differential[[id number]]        Abnormal            Final result                 Please view results for these tests on the individual orders.    CBC Auto Differential [[id number]]  (Abnormal) Collected: 06/05/24 0600    Specimen: Blood Updated: 06/05/24 0625     WBC 13.71 10*3/mm3      RBC 4.03 10*6/mm3      Hemoglobin 11.9 g/dL      Hematocrit 36.2 %      MCV 89.8 fL      MCH 29.5 pg      MCHC 32.9 g/dL      RDW 12.8 %      RDW-SD 41.9 fl      MPV 11.9 fL      Platelets 230 10*3/mm3      Neutrophil % 70.4 %      Lymphocyte % 17.5 %      Monocyte % 9.6 %      Eosinophil % 0.8 %      Basophil % 0.3 %      Immature Grans % 1.4 %      Neutrophils, Absolute 9.65 10*3/mm3      Lymphocytes, Absolute 2.40 10*3/mm3      Monocytes, Absolute 1.32 10*3/mm3      Eosinophils, Absolute 0.11 10*3/mm3      Basophils, Absolute 0.04 10*3/mm3      Immature Grans, Absolute 0.19 10*3/mm3      nRBC 0.0 /100 WBC           Assessment:    Pregnancy      1.  Intrauterine pregnancy at 38-5/7 weeks  2.  Active labor    Plan:  Admit, epidural on request.  Anticipate a vaginal delivery.    Guzman Riley MD  6/5/2024

## 2024-06-05 NOTE — ANESTHESIA PROCEDURE NOTES
Labor Epidural      Patient reassessed immediately prior to procedure    Patient location during procedure: OB  Start Time: 6/5/2024 7:01 AM  Performed By  Anesthesiologist: Guzman Grant MD  Preanesthetic Checklist  Completed: patient identified, risks and benefits discussed and timeout performed  Prep:  Pt Position:sitting  Sterile Tech:cap, gloves, mask and sterile barrier  Prep:chlorhexidine gluconate and isopropyl alcohol  Monitoring:blood pressure monitoring, continuous pulse oximetry and EKG  Epidural Block Procedure:  Approach:midline  Guidance:landmark technique and palpation technique  Location:L3-L4  Needle Type:Tuohy  Needle Gauge:17 G  Loss of Resistance Medium: saline  Loss of Resistance: 4cm  Cath Depth at skin:10 cm  Paresthesia: none  Aspiration:negative  Test Dose:negative  Number of Attempts: 1  Post Assessment:  Dressing:occlusive dressing applied and secured with tape  Pt Tolerance:patient tolerated the procedure well with no apparent complications  Complications:no

## 2024-06-05 NOTE — NURSING NOTE
Patient admitted at 0520 in active labor.  Not able to provide urine specimen on arrival d/t active labor and wanting epidural as soon as possible.  Epidural test at 0711, epidural infusing at 0715.  Hurst catheter placed at 0758 and urine drug screen collected POST EPIDURAL PLACEMENT at 0801.

## 2024-06-05 NOTE — LACTATION NOTE
This note was copied from a baby's chart.  P2 Term.  BF 1st baby but had low supply.  Has started using personal hands free Lansinoh pump to help with establish adequate milk supply.  Has a Spectra pump at home.  Some colostrum is seen in bottle.  Educated on cleaning pump after each use, milk storage, and to syringe feed all EBM to baby and supplies were given.  Mom reports baby has latched well 4 times already and denies any nipple discomfort.  Educated on frequency of feedings and expected output for day 1.  Encouraged to call for latch assessment with next feeding.  LC number on whiteboard.

## 2024-06-06 LAB
BASOPHILS # BLD AUTO: 0.07 10*3/MM3 (ref 0–0.2)
BASOPHILS NFR BLD AUTO: 0.5 % (ref 0–1.5)
BUPRENORPHINE UR QL: NEGATIVE NG/ML
DEPRECATED RDW RBC AUTO: 41.6 FL (ref 37–54)
EOSINOPHIL # BLD AUTO: 0.14 10*3/MM3 (ref 0–0.4)
EOSINOPHIL NFR BLD AUTO: 1 % (ref 0.3–6.2)
ERYTHROCYTE [DISTWIDTH] IN BLOOD BY AUTOMATED COUNT: 12.8 % (ref 12.3–15.4)
HCT VFR BLD AUTO: 34.6 % (ref 34–46.6)
HGB BLD-MCNC: 11.5 G/DL (ref 12–15.9)
IMM GRANULOCYTES # BLD AUTO: 0.19 10*3/MM3 (ref 0–0.05)
IMM GRANULOCYTES NFR BLD AUTO: 1.4 % (ref 0–0.5)
LYMPHOCYTES # BLD AUTO: 3.19 10*3/MM3 (ref 0.7–3.1)
LYMPHOCYTES NFR BLD AUTO: 23.6 % (ref 19.6–45.3)
MCH RBC QN AUTO: 29.7 PG (ref 26.6–33)
MCHC RBC AUTO-ENTMCNC: 33.2 G/DL (ref 31.5–35.7)
MCV RBC AUTO: 89.4 FL (ref 79–97)
MONOCYTES # BLD AUTO: 1.16 10*3/MM3 (ref 0.1–0.9)
MONOCYTES NFR BLD AUTO: 8.6 % (ref 5–12)
NEUTROPHILS NFR BLD AUTO: 64.9 % (ref 42.7–76)
NEUTROPHILS NFR BLD AUTO: 8.76 10*3/MM3 (ref 1.7–7)
NRBC BLD AUTO-RTO: 0 /100 WBC (ref 0–0.2)
PLATELET # BLD AUTO: 214 10*3/MM3 (ref 140–450)
PMV BLD AUTO: 11.7 FL (ref 6–12)
RBC # BLD AUTO: 3.87 10*6/MM3 (ref 3.77–5.28)
WBC NRBC COR # BLD AUTO: 13.51 10*3/MM3 (ref 3.4–10.8)

## 2024-06-06 PROCEDURE — 0503F POSTPARTUM CARE VISIT: CPT

## 2024-06-06 PROCEDURE — 85025 COMPLETE CBC W/AUTO DIFF WBC: CPT | Performed by: OBSTETRICS & GYNECOLOGY

## 2024-06-06 RX ADMIN — Medication 1 TABLET: at 08:25

## 2024-06-06 RX ADMIN — IBUPROFEN 600 MG: 600 TABLET, FILM COATED ORAL at 14:11

## 2024-06-06 RX ADMIN — DOCUSATE SODIUM 100 MG: 100 CAPSULE, LIQUID FILLED ORAL at 08:25

## 2024-06-06 RX ADMIN — IBUPROFEN 600 MG: 600 TABLET, FILM COATED ORAL at 20:11

## 2024-06-06 RX ADMIN — IBUPROFEN 600 MG: 600 TABLET, FILM COATED ORAL at 08:25

## 2024-06-06 NOTE — PROGRESS NOTES
"Discharge Planning Assessment  [facility]     Patient Name: Ana Enriquez  MRN: [id number]  Today's Date: 6/6/2024    Admit Date: 6/5/2024    Plan: Infant may discharge to mother when medically ready; CSW will follow cord. MELANIE Eisenberg.   Discharge Needs Assessment    No documentation.                  Discharge Plan       Row Name 06/06/24 0959       Plan    Plan Infant may discharge to mother when medically ready; CSW will follow cord. MELANIE Eisenberg.    Plan Comments Mother: Ana Enriquez, MRN: [id number]; infant: Fransiscarl \"Héctor\" Zoe, MRN: [id number]. CSW consulted for \"hx of cannabis use.\" Of note, mother's UDS was negative prenatally on 10/23 & on admit. Infant's UDS was negative; cord toxicology sent. CSW met with mother alone at bedside. Mother verified address, phone number, and insurance. Mother reports [employer] has not spoken to her about adding infant to health insurance. Mother reports having a car seat, crib/bassinet, clothes, and diapers for infant. Mother and father have one other child: 2yr old, who is being cared for by father of infant/fiancé during hospital stay. Mother reports, maternal grandparents, paternal grandparents, father of infant/fiancé, friends, and other family members are available for support as needed. Mother reports infant is following up with Dr. Walsh after discharge; mother is comfortable scheduling appointments for infant and has reliable transportation. Mother is current with WIC and asked how to add infant onto her plan. CSW educated mother on how to add infant onto her WIC plan. CSW also consulted the hospital WIC rep. Mother denies any violence, threats, or feeling unsafe at home or relationship. Mother is enrolled in the [facility] Connection program. CSW provided mother with a packet of resources including: WIC, HANDS, transportation, infant supplies, counseling, online support groups, postpartum mood and anxiety resources, and general community resources. " CSW spent time building rapport with mother, and offered validation, support, and encouragement to mother throughout assessment. Mother was polite and appropriate, and denied having unmet needs or concerns at this time. CSW will follow cord toxicology and complete mandated reporting to CPS if warranted. MELANIE Eisenberg.                  Continued Care and Services - Admitted Since 6/5/2024    No active coordination exists for this encounter.       Selected Continued Care - Episodes Includes continued care and service providers with selected services from the active episodes listed below      [facility] Episode start date: 2/20/2024   There are no active outsourced providers for this episode.                    Demographic Summary       Row Name 06/06/24 0958       General Information    Admission Type inpatient    Arrived From home    Referral Source nursing    Reason for Consult substance use concerns    General Information Comments Hx of THC use.                   Functional Status       Row Name 06/06/24 0958       Functional Status, IADL    Medications independent    Meal Preparation independent    Housekeeping independent    Laundry independent    Shopping independent       Mental Status    General Appearance WDL WDL       Mental Status Summary    Recent Changes in Mental Status/Cognitive Functioning no changes                   Psychosocial       Row Name 06/06/24 0958       Behavior WDL    Behavior WDL WDL       Emotion Mood WDL    Emotion/Mood/Affect WDL WDL       Speech WDL    Speech WDL WDL       Perceptual State WDL    Perceptual State WDL WDL       Thought Process WDL    Thought Process WDL WDL       Intellectual Performance WDL    Intellectual Performance WDL WDL       Coping/Stress    Major Change/Loss/Stressor birth    Patient Personal Strengths future/goal oriented;motivated;positive attitude;strong support system    Sources of Support other family members;parent(s);friend(s);spouse                    Abuse/Neglect       Row Name 06/06/24 0959       Personal Safety    Feels Unsafe at Home or Work/School no    Feels Threatened by Someone no    Does Anyone Try to Keep You From Having Contact with Others or Doing Things Outside Your Home? no    Physical Signs of Abuse Present no                   Legal    No documentation.                  Substance Abuse       Row Name 06/06/24 0959       Substance Use    Substance Use Comment Mom & infant UDS neg; cord tox sent.                   Patient Forms    No documentation.                     RAVINDRA Tsai

## 2024-06-06 NOTE — PROGRESS NOTES
VAGINAL DELIVERY POSTPARTUM DAY 1    6/6/2024  PATIENT: Ana Enriquez        MR#:[id number]  LOCATION: [facility]  DATE OF ADMISSION: 6/5/2024  ADMISSION  DIAGNOSIS:   Pregnancy     CURRENT DIAGNOSIS: No notes have been filed under this hospital service.  Service: Hospitalist      SUBJECTIVE     Ana feels well.  Patient describes her lochia as less than menses.  Pain is well controlled.        OBJECTIVE   Temp: Temp:  [97.1 °F (36.2 °C)-98.7 °F (37.1 °C)] 98.3 °F (36.8 °C) Temp src: Oral   BP: BP: ()/(38-88) 126/88        Pulse: Heart Rate:  [] 76  RR: Resp:  [16] 16    General:  Awake, alert, no acute distress   Cardiac: Regular rate and rhythm    Respiratory: Lungs clear bilaterally, normal respiratory effort    Abdomen: Soft, non-distended, fundus firm, below umbilicus, appropriately tender   Pelvis: deferred   Extremities: Calves NT bilaterally, DTR 2+, no clonus noted, trace edema     Lab Results   Component Value Date    WBC 13.51 (H) 06/06/2024    HGB 11.5 (L) 06/06/2024    HCT 34.6 06/06/2024     06/06/2024    AST 16 06/05/2024    ALT <5 06/05/2024    URICACID 4.4 12/29/2021    CREATININE 0.45 (L) 06/05/2024       ASSESSMENT:  Postpartum day 1 after vaginal delivery. Hgb: 11.5.    PLAN:Doing well. Continue routine supportive care. Discontinue IV, advance diet, may shower. Plan for discharge tomorrow as clinical picture allows.     Marija Soriano CNM  10:12 EDT  June 6, 2024

## 2024-06-06 NOTE — ANESTHESIA POSTPROCEDURE EVALUATION
"Patient: Ana Enriquez    Procedure Summary       Date: 06/05/24 Room / Location:     Anesthesia Start: 0701 Anesthesia Stop: 1101    Procedure: LABOR ANALGESIA Diagnosis:     Scheduled Providers:  Provider: Guzman Grant MD    Anesthesia Type: epidural ASA Status: 2            Anesthesia Type: epidural    Vitals  Vitals Value Taken Time   /81 06/05/24 1600   Temp 36.2 °C (97.1 °F) 06/05/24 1600   Pulse 86 06/05/24 1600   Resp 16 06/05/24 1600   SpO2 98 % 06/05/24 1600           Post Anesthesia Care and Evaluation    Level of consciousness: awake and alert  Pain management: adequate    Airway patency: patent  Anesthetic complications: No anesthetic complications  PONV Status: none  Cardiovascular status: acceptable  Respiratory status: acceptable  Hydration status: acceptable    Comments: /81 (BP Location: Right arm, Patient Position: Sitting)   Pulse 86   Temp 36.2 °C (97.1 °F) (Oral)   Resp 16   Ht 162.6 cm (64\")   Wt 80.7 kg (178 lb)   LMP 09/08/2023   SpO2 98%   Breastfeeding Yes   BMI 30.55 kg/m²       "

## 2024-06-06 NOTE — LACTATION NOTE
This note was copied from a baby's chart.  Baby currently latched to R breast in cradle hold.  Latch is shallow but Mom denies nipple discomfort.  Reports baby is latching often for 30-50 min each time and has had several wet and stool diapers.  Educated on breaking latch with finger, then positioned in cross cradle hold.  Educated on an asymmetrical latch, nipple to nose, chin first, and wide gape.  Baby then had a deeper latch with food jaw rotation observed.  Encouraged to call LC for any further assistance .

## 2024-06-07 VITALS
TEMPERATURE: 97.6 F | HEART RATE: 70 BPM | BODY MASS INDEX: 30.39 KG/M2 | WEIGHT: 178 LBS | SYSTOLIC BLOOD PRESSURE: 120 MMHG | OXYGEN SATURATION: 98 % | DIASTOLIC BLOOD PRESSURE: 76 MMHG | RESPIRATION RATE: 16 BRPM | HEIGHT: 64 IN

## 2024-06-07 PROCEDURE — 0503F POSTPARTUM CARE VISIT: CPT | Performed by: NURSE PRACTITIONER

## 2024-06-07 RX ORDER — PSEUDOEPHEDRINE HCL 30 MG
100 TABLET ORAL 2 TIMES DAILY
Qty: 30 CAPSULE | Refills: 0 | Status: SHIPPED | OUTPATIENT
Start: 2024-06-07

## 2024-06-07 RX ORDER — IBUPROFEN 600 MG/1
600 TABLET ORAL EVERY 6 HOURS PRN
Qty: 90 TABLET | Refills: 1 | Status: SHIPPED | OUTPATIENT
Start: 2024-06-07

## 2024-06-07 RX ADMIN — IBUPROFEN 600 MG: 600 TABLET, FILM COATED ORAL at 08:03

## 2024-06-07 RX ADMIN — Medication 1 TABLET: at 08:00

## 2024-06-07 NOTE — PROGRESS NOTES
Postpartum Progress Note      Status post Vaginal Delivery: Doing well postoperatively.     1) Postpartum care immediately following delivery :    Routine care, continue current care. Discharge home today. Reviewed discharge instructions in detail    2) Hx preeclampsia: BP normal range. Normal LFT and platelets    Hgb: 11.9-->11.5  Rh status: B+  Syphilis screen in hospital: non-reactive  Rubella: Immune  Gender: Female    Subjective     Postpartum Day 2: Vaginal delivery    The patient feels well. The patient denies emotional concerns. Pain is well controlled with current medications. The baby is well. The patient is ambulating well. The patient is tolerating a normal diet.     Objective     Vital signs in last 24 hours:  Temp:  [97.6 °F (36.4 °C)-98.2 °F (36.8 °C)] 97.6 °F (36.4 °C)  Heart Rate:  [66-84] 66  Resp:  [16] 16  BP: (127)/(83-86) 127/86      General:    alert, appears stated age, and cooperative   CV: RRR, no m/r/g   Lungs: CTAB   Abdomen:  Soft, Non-tender    Lochia:  appropriate   Uterine Fundus:   firm   Ext    Edema trace   DVT Evaluation:  No evidence of DVT seen on physical exam.     Lab Results   Component Value Date    WBC 13.51 (H) 06/06/2024    HGB 11.5 (L) 06/06/2024    HCT 34.6 06/06/2024    MCV 89.4 06/06/2024     06/06/2024       Zenia Bennett, APRN  6/7/2024  08:14 EDT

## 2024-06-07 NOTE — DISCHARGE SUMMARY
Date of Discharge:  6/7/2024    Discharge Diagnosis: s/p vaginal delivery     Presenting Problem/History of Present Illness  Pregnancy [Z34.90]     Hospital Course  Patient is a 23 y.o. female presented with painful contractions.  Her pregnancy was complicated by history of preeclampsia in prior pregnanct.  She delivered a viable female infant weighing 7lb 1.2oz with apgars of 8&9. For further information surrounding this delivery please seen delivery note. Her postpartum course has been uncomplicated. She is voiding adequately, tolerating a regular diet, and she is ambulating without difficulty or assistance. Her pain is well controlled. We have reviewed discharge instructions in detail.     Procedures Performed         Consults:   Consults       No orders found from 5/7/2024 to 6/6/2024.            Pertinent Test Results:   WBC   Date Value Ref Range Status   06/06/2024 13.51 (H) 3.40 - 10.80 10*3/mm3 Final     RBC   Date Value Ref Range Status   06/06/2024 3.87 3.77 - 5.28 10*6/mm3 Final     Hemoglobin   Date Value Ref Range Status   06/06/2024 11.5 (L) 12.0 - 15.9 g/dL Final     Hematocrit   Date Value Ref Range Status   06/06/2024 34.6 34.0 - 46.6 % Final     MCV   Date Value Ref Range Status   06/06/2024 89.4 79.0 - 97.0 fL Final     MCH   Date Value Ref Range Status   06/06/2024 29.7 26.6 - 33.0 pg Final     MCHC   Date Value Ref Range Status   06/06/2024 33.2 31.5 - 35.7 g/dL Final     RDW   Date Value Ref Range Status   06/06/2024 12.8 12.3 - 15.4 % Final     RDW-SD   Date Value Ref Range Status   06/06/2024 41.6 37.0 - 54.0 fl Final     MPV   Date Value Ref Range Status   06/06/2024 11.7 6.0 - 12.0 fL Final     Platelets   Date Value Ref Range Status   06/06/2024 214 140 - 450 10*3/mm3 Final     Neutrophil %   Date Value Ref Range Status   06/06/2024 64.9 42.7 - 76.0 % Final     Lymphocyte %   Date Value Ref Range Status   06/06/2024 23.6 19.6 - 45.3 % Final     Monocyte %   Date Value Ref Range Status    06/06/2024 8.6 5.0 - 12.0 % Final     Eosinophil %   Date Value Ref Range Status   06/06/2024 1.0 0.3 - 6.2 % Final     Basophil %   Date Value Ref Range Status   06/06/2024 0.5 0.0 - 1.5 % Final     Immature Grans %   Date Value Ref Range Status   06/06/2024 1.4 (H) 0.0 - 0.5 % Final     Neutrophils, Absolute   Date Value Ref Range Status   06/06/2024 8.76 (H) 1.70 - 7.00 10*3/mm3 Final     Lymphocytes, Absolute   Date Value Ref Range Status   06/06/2024 3.19 (H) 0.70 - 3.10 10*3/mm3 Final     Monocytes, Absolute   Date Value Ref Range Status   06/06/2024 1.16 (H) 0.10 - 0.90 10*3/mm3 Final     Eosinophils, Absolute   Date Value Ref Range Status   06/06/2024 0.14 0.00 - 0.40 10*3/mm3 Final     Basophils, Absolute   Date Value Ref Range Status   06/06/2024 0.07 0.00 - 0.20 10*3/mm3 Final     Immature Grans, Absolute   Date Value Ref Range Status   06/06/2024 0.19 (H) 0.00 - 0.05 10*3/mm3 Final     nRBC   Date Value Ref Range Status   06/06/2024 0.0 0.0 - 0.2 /100 WBC Final       Condition on Discharge:  Stable    Vital Signs  Temp:  [97.6 °F (36.4 °C)-98.2 °F (36.8 °C)] 97.6 °F (36.4 °C)  Heart Rate:  [66-84] 66  Resp:  [16] 16  BP: (127)/(83-86) 127/86    Physical Exam:   See Progress Note    Discharge Disposition  Home or Self Care    Discharge Medications     Discharge Medications        New Medications        Instructions Start Date   docusate sodium 100 MG capsule   100 mg, Oral, 2 Times Daily      ibuprofen 600 MG tablet  Commonly known as: ADVIL,MOTRIN   600 mg, Oral, Every 6 Hours PRN             Continue These Medications        Instructions Start Date   Prenatal/Folic Acid tablet   1 tablet, Oral, Daily      Sodium Fluoride 5000 Sensitive 1.1-5 % gel  Generic drug: Sod Fluoride-Potassium Nitrate   No dose, route, or frequency recorded.      valACYclovir 1000 MG tablet  Commonly known as: VALTREX   2,000 mg, Oral               Discharge Diet: Regular    Activity at Discharge: Pelvic rest X6  weeks    Education: Warning signs and symptoms given, no tub baths, nothing in the vagina for 6 weeks, no driving for 2 weeks or while talking narcotics     Follow-up Appointments  No future appointments.  Additional Instructions for the Follow-ups that You Need to Schedule       Discharge Follow-up with Specified Provider: Erik; 6 Weeks   As directed      To: Erik   Follow Up: 6 Weeks   Follow Up Details: Postpartum follow up                Test Results Pending at Discharge       CHRIS Stack  06/07/24  08:18 EDT    Time: 08:18 EDT

## 2024-06-07 NOTE — LACTATION NOTE
This note was copied from a baby's chart.  PT is going home today. Reports baby is BF well on the right breast, but she can not latch her o the left. Encouraged to call with next feeding. Informed mom about the [facility] info on the back of the edicational booklet. Discussed engourgement, pumping, milk storage and when to expect mature milk. PT denieis any questions.

## 2024-06-07 NOTE — LACTATION NOTE
Lactation Consult Note  Mom called for help latching baby on the left breast. Assisted mom with latching baby on the left breast in a cross cradle position. Educated PT on starting nipple to nose to achieve deep latch and baby was able to do it immediately.  She is latching well and has a good jaw rotation. Mom denies any questions. Encouraged to call if needing further help.     Evaluation Completed: 2024 12:46 EDT  Patient Name: Ana Enriquez  :  2000  MRN:  [id number]     REFERRAL  INFORMATION:                          Date of Referral: 24   Person Making Referral: patient  Maternal Reason for Referral: latch difficulty (on the left)       DELIVERY HISTORY:        Skin to skin initiation date/time: 2024  11:04 AM   Skin to skin end date/time:           MATERNAL ASSESSMENT:  Breast Size Issue: none (24)  Breast Shape: Bilateral:, round (24)  Breast Density: Bilateral:, soft (24)  Areola: Bilateral:, elastic (24)  Nipples: Bilateral:, everted, graspable (24)                INFANT ASSESSMENT:  Information for the patient's :  ZoeLesvia [[id number]]   No past medical history on file.   Feeding Readiness Cues: other (see comments) (not latching on the left) (24)      Feeding Tolerance/Success: rooting, strong suck (24)               Feeding Interventions: sucking promoted, latch assistance provided (24)               Breastfeeding: breastfeeding, left side only (24)   Infant Positioning: cross-cradle (24)      Breastfeeding Time, Right (min): 15 (24)   Effective Latch During Feeding: yes (24)   Suck/Swallow/Breathing Coordination: present (24)   Signs of Milk Transfer: audible swallow (24)       Latch: 2-->grasps breast, tongue down, lips flanged, rhythmic sucking (24)   Audible Swallowin-->spontaneous and  intermittent (24 hrs old) (06/07/24 1225)   Type of Nipple: 2-->everted (after stimulation) (06/07/24 1225)   Comfort (Breast/Nipple): 2-->soft/nontender (06/07/24 1225)   Hold (Positioning): 1-->minimal assist, teach one side, mother does other, staff holds (06/07/24 1225)   Latch Score: 9 (06/07/24 1225)                    MATERNAL INFANT FEEDING:     Maternal Emotional State: receptive, relaxed (06/07/24 1225)  Infant Positioning: cross-cradle (06/07/24 1225)   Signs of Milk Transfer: audible swallow (06/07/24 1225)  Pain with Feeding: no (06/07/24 1225)           Milk Ejection Reflex: present (06/07/24 1225)           Latch Assistance: minimal assistance (06/07/24 1225)                               EQUIPMENT TYPE:                                 BREAST PUMPING:          LACTATION REFERRALS:

## 2024-06-10 ENCOUNTER — PATIENT OUTREACH (OUTPATIENT)
Dept: LABOR AND DELIVERY | Facility: HOSPITAL | Age: 24
End: 2024-06-10
Payer: COMMERCIAL

## 2024-06-10 NOTE — OUTREACH NOTE
[facility]    Missed visiting patient after her delivery while I was out of office. Called her today and she reports that everything is going really well. Infant just seen at the peds office today and she is planning to call for a WIC appt later. PP f/u call in one week.     Soco Damon RN  Maternity Nurse Navigator    6/10/2024, 11:16 EDT

## 2024-06-17 ENCOUNTER — PATIENT OUTREACH (OUTPATIENT)
Dept: LABOR AND DELIVERY | Facility: HOSPITAL | Age: 24
End: 2024-06-17
Payer: COMMERCIAL

## 2024-06-17 NOTE — OUTREACH NOTE
[facility]  Unable to Reach       Questions/Answers      Flowsheet Row Responses   Pending Outreach Postpartum Check-in   Call Attempt First   Outcome Left message   Next Call Attempt Date 06/18/24            Soco COOK - RN  Maternity Nurse Navigator    6/17/2024, 12:35 EDT

## 2024-06-18 ENCOUNTER — PATIENT OUTREACH (OUTPATIENT)
Dept: LABOR AND DELIVERY | Facility: HOSPITAL | Age: 24
End: 2024-06-18
Payer: COMMERCIAL

## 2024-06-18 NOTE — OUTREACH NOTE
[facility]  Postpartum Check-In    Questions/Answers      Flowsheet Row Responses   Visit Setting Telephone   Best Method for Contacting Cell   OB Discharge Note Reviewed  Reviewed   OB Discharge Medications Reviewed  Reviewed    discharged home with mother? Yes   Current Pain Levels 0-10 0   At Rest Pain Levels 0-10 0   Pain level with activity 0-10 0   Acceptable Pain Level 0-10 0   Verbalized Emotional State Acceptance   Family/Support Network Significant Other   Level of Involvement in Care Supportive, Interactive, Attentive   Do you feel comfortable in your relationship with your baby? Yes   Have members of your household adjusted to your baby? Yes   Is the baby's father supportive and/or involved with the baby? Yes   How does your partner feel about the baby? Happy, Involved   Do you feel safe at home, school and work? Yes   Are you in a relationship with someone who threatens you or hurts you? No   Do you have the resources to keep yourself and your baby healthy and safe? Yes   Lochia (per patient report) Brown-vince Red   Amount Scant   Number of pads per day 3   Lochia Odor None   Is patient breastfeeding? Yes, not pumping   Not pumping - Left Breast Soft, Nontender   Not pumping - Right Breast Soft, Nontender   Postpartum Depression Screening Education Education Provided   Breastfeeding Education Education Provided   Postpartum Care Education Education Provided   S & S to report Education Provided   Followup Appointments Made No   Well Child Visit Appointments Made Yes   Well Child Checkup Provider Name AMERICO Chopra   Well Child Check Up Date: 06/10/24   Did you complete the visit? Yes   Were there any specific concerns? No   Umbilical Cord No reported signs or symptoms   Feeding Readiness Cues: Crying, Eager   Infant Feeding Method Breast, Expressed Breast Milk   Is a lactation referral indicated? No   Frequency of feedings every 2 hours, on demand   Expressed milk PO (mL) 1.5 oz    Expressed milk- frequency of feedings rare   Number of wet diapers x 24 hours 10   Last BM x 24 hours 10   What safe sleep surface is available? Bassinet   Are there stuffed animals, toys, pillows, quilts, blankets, wedges, positioners, bumpers or other loose bedding in the infant's sleeping environment? No   Where does the baby usually sleep? Bassinet   Does the baby ever share a sleep surface with a sibling, adult or pet? No   Does the baby ever share a sleep surface in a bed, couch, recliner or other? No   What position do you place your baby to sleep for naps? Back   What position do you place your baby to sleep at night Back   Are you and/or other caregivers smoking inside or outside the baby's home? No   Is the infant dressed appropiately for the temperature of the home? Yes   Do you use a clean, dry pacifier that is not attached to a string or stuffed animal? --  [does not take]            Review of Systems    Most Recent [place]  Depression Scale Score (EPDS)    Performed by a clinician: 2 (2024  1:43 PM)    5 Ps Screen  Done     Pt reports that everything is going really well. She has no pain, reports scant bleeding, and she is exclusively breastfeeding. Her WIC benefits are active. Infant has been seen at the peds office with no reported concerns and she will call to schedule her PP appt. EPDS low, reports just her usual baseline anxiety and she states she is well supported at home by FOB. Will send to RN call center.     Soco COOK - AXEL  Maternity Nurse Navigator    2024, 13:50 EDT

## 2024-06-20 ENCOUNTER — DOCUMENTATION (OUTPATIENT)
Dept: PHYSICAL THERAPY | Facility: HOSPITAL | Age: 24
End: 2024-06-20
Payer: COMMERCIAL

## 2024-06-20 DIAGNOSIS — O26.899 PREGNANCY RELATED LOW BACK PAIN, ANTEPARTUM: Primary | ICD-10-CM

## 2024-06-20 DIAGNOSIS — R39.15 URINARY URGENCY: ICD-10-CM

## 2024-06-20 DIAGNOSIS — M54.50 PREGNANCY RELATED LOW BACK PAIN, ANTEPARTUM: Primary | ICD-10-CM

## 2024-06-20 DIAGNOSIS — R35.0 URINARY FREQUENCY: ICD-10-CM

## 2024-06-20 DIAGNOSIS — M54.50 RIGHT-SIDED LOW BACK PAIN WITHOUT SCIATICA, UNSPECIFIED CHRONICITY: ICD-10-CM

## 2024-06-20 NOTE — THERAPY DISCHARGE NOTE
Outpatient Physical Therapy Discharge Summary         Patient Name: Ana Enriquez  : 2000  MRN: [id number]    Today's Date: 2024    Visit Dx:    ICD-10-CM ICD-9-CM   1. Pregnancy related low back pain, antepartum  O26.899 646.83    M54.50 724.2   2. Right-sided low back pain without sciatica, unspecified chronicity  M54.50 724.2   3. Urinary urgency  R39.15 788.63   4. Urinary frequency  R35.0 788.41        PT OP Goals       Row Name 24 1500          PT Short Term Goals    STG Date to Achieve 24  -RS     STG 1 The pt will demonstrate appropriate use of log roll technique and breathing mechanics to facilitate improved tolerance for transitional position performance.  -RS     STG 1 Progress Not Met  -RS     STG 2 The pt will report urinating no more than 2x per hour on average to facilitate improved participation in daily activities.  -RS     STG 2 Progress Not Met  -RS        Long Term Goals    LTG Date to Achieve 24  -RS     LTG 1 The pt will report urinating no more than 1x per hour during the day and 3x at night on average to facilitate improved participation in daily activities and improved restorative sleep pattern.  -RS     LTG 1 Progress Not Met  -RS     LTG 2 the pt will report at least  50% reduction in pain with transitional positions to facilitate improved functional activity tolerance.  -RS     LTG 2 Progress Not Met  -RS     LTG 3 The pt will be educated regarding labor preparation HEP to facilitate improved hip mobility and body awareness during labor.  -RS     LTG 3 Progress Not Met  -RS     LTG 4 The pt will demonstrate R hip strength at least4+/5 to facilitate improved pelvic stability and decreased pain .  -RS     LTG 4 Progress Not Met  -RS               User Key  (r) = Recorded By, (t) = Taken By, (c) = Cosigned By      Initials Name Provider Type    Abby Jeong, PT Physical Therapist                    OP PT Discharge Summary  Date of Discharge:  06/20/24  Reason for Discharge: Non-compliant  Outcomes Achieved: Unable to make functional progress toward goals at this time  Discharge Instructions/Additional Comments: Pt is discharged from OP PT thi date secondary to non compliance with schedule.      Time Calculation:                    Abby Alvarado, PT  6/20/2024

## 2024-06-25 ENCOUNTER — PATIENT OUTREACH (OUTPATIENT)
Dept: CALL CENTER | Facility: HOSPITAL | Age: 24
End: 2024-06-25
Payer: COMMERCIAL

## 2024-06-28 ENCOUNTER — TELEPHONE (OUTPATIENT)
Dept: OBSTETRICS AND GYNECOLOGY | Facility: CLINIC | Age: 24
End: 2024-06-28
Payer: COMMERCIAL

## 2024-06-28 NOTE — TELEPHONE ENCOUNTER
Provider: DR PHILLIP    Caller: CAROLYN SANTOS    Relationship to Patient: SELF    Phone Number: [phone]    Reason for Call: PT IS POSTPARTUM 3WEEKS; PT HAS BEEN HAVING BAD HEADACHES THE LAST 3 DAYS; PT STATED HER VISION WAS LIKE LOOKING THROUGH A KALEIDOSCOPE FOR ABOUT AN HOUR TODAY; PT STATED SHE IS SEEING FUZZINESS ON THE SIDES OF HER EYES.    PT WOULD LIKE TO SPEAK TO SOMEONE CLINICAL TO ADVISE ON WHAT SHE NEEDS TO DO.  PLEASE CALL PT ASAP.

## 2024-06-28 NOTE — TELEPHONE ENCOUNTER
I would recommend evaluation for preeclampsia.  Spoke with L&D and since she is > 2 weeks would recommend going to ED for evaluation for  HA

## 2024-07-22 ENCOUNTER — POSTPARTUM VISIT (OUTPATIENT)
Dept: OBSTETRICS AND GYNECOLOGY | Facility: CLINIC | Age: 24
End: 2024-07-22
Payer: COMMERCIAL

## 2024-07-22 VITALS
HEIGHT: 64 IN | WEIGHT: 155.4 LBS | SYSTOLIC BLOOD PRESSURE: 114 MMHG | BODY MASS INDEX: 26.53 KG/M2 | DIASTOLIC BLOOD PRESSURE: 76 MMHG

## 2024-07-22 DIAGNOSIS — H53.9 VISION CHANGES: ICD-10-CM

## 2024-07-22 DIAGNOSIS — Z30.011 ENCOUNTER FOR INITIAL PRESCRIPTION OF CONTRACEPTIVE PILLS: ICD-10-CM

## 2024-07-22 RX ORDER — ACETAMINOPHEN AND CODEINE PHOSPHATE 120; 12 MG/5ML; MG/5ML
1 SOLUTION ORAL DAILY
Qty: 84 TABLET | Refills: 3 | Status: SHIPPED | OUTPATIENT
Start: 2024-07-22 | End: 2025-07-22

## 2024-07-22 NOTE — PROGRESS NOTES
Chief Complaint   Patient presents with    Postpartum Care     6 week pp follow up        SUBJECTIVE:   Ana Enriquez is a 23 y.o.  who presents s/p Spontaneous Vaginal Delivery on 24 at 38w5d following spontaneous labor. Her pregnancy was complicated by history of  birth, history of preeclampsia in previous pregnancy, maternal anemia. She reports doing well but has been struggling with episodes of vision changes. She states it is her left eye mainly but will happen in both. She states that at times she cannot see. At has occurred at least 12 times since delivery. She states she has of different vision fields and sees Kaleidoscopes or flashing. The episodes last for 1-2 hours.   Bowel and bladder function have returned to normal.   She reports vaginal bleeding has stopped and she has not yet had a period.   Denies mood changes and scored a 0 on the postpartum depression scale.   She is breast feeding.     Past Medical History:   Diagnosis Date    Cannabis abuse     Ectopic pregnancy     Preeclampsia     with 1st pregnancy    Seizure in childhood     Self-mutilation     cutting     Past Surgical History:   Procedure Laterality Date    WISDOM TOOTH EXTRACTION       Social History     Tobacco Use    Smoking status: Former     Types: Electronic Cigarette    Smokeless tobacco: Never    Tobacco comments:     DENIED   Vaping Use    Vaping status: Former   Substance Use Topics    Alcohol use: No     Comment: DENIED    Drug use: Not Currently     Types: Marijuana     Comment: before pregnancy     Family History   Problem Relation Age of Onset    Nephrolithiasis Mother     Other Maternal Grandmother         fibrocystic disease of breast    Nephrolithiasis Maternal Grandmother     Heart disease Maternal Grandmother     Leukemia Paternal Grandmother     Heart disease Paternal Grandmother     Lymphoma Paternal Grandmother     No Known Problems Sister     Cancer Paternal Grandfather         skin       Patient  "Active Problem List   Diagnosis    Abnormal finding on thyroid function test    Bipolar I disorder, single manic episode    New onset seizure    Panic disorder without agoraphobia    Anxiety    Headache    High risk medication use    Polyphagia    Herpes labialis    Self mutilating behavior    h/o ectopic pregnancy    Nausea and vomiting during pregnancy prior to 22 weeks gestation    Positive urine drug screen:+THC. Needs =    Shortness of breath    Right upper quadrant pain    Chest pain    Dizziness    Pre-syncope    Swelling    Severe pre-eclampsia, third trimester     (normal spontaneous vaginal delivery)    Normal labor    Normal vaginal delivery        OBJECTIVE:   /76   Ht 162.6 cm (64.02\")   Wt 70.5 kg (155 lb 6.4 oz)   LMP 2023   Breastfeeding Yes   BMI 26.66 kg/m²    Physical Examination:   General appearance - alert, well appearing, and in no distress  Breasts - Examined in supine position  Symmetric without masses or skin dimpling  Nipples normal without inversion, lesions or discharge  There are no palpable axillary nodes  Chest - no tachypnea, retractions or cyanosis  Heart - normal rate and regular rhythm  Abdomen - soft, nontender, nondistended, no masses or organomegaly  Pelvic - normal external female genitalia, normal vulva, normal vaginal mucosa, normal appearing cervix, normal sized uterus and non-tender, no adnexal masses or tenderness   Neurological - screening mental status exam normal  Musculoskeletal - no joint tenderness, deformity or swelling  Psychiatric - normal mood and affect    Lab Results   Component Value Date    WBC 13.51 (H) 2024    HGB 11.5 (L) 2024    HCT 34.6 2024    MCV 89.4 2024     2024        ASSESSMENT:   S/p   Vision changes - possible ocular migraine   Encounter for oral contraception     PLAN:   Doing well postpartum except for vision changes which I think is unrelated to delivery. Her description " sounds like an ocular migraine and will refer back to neurology for further evaluation. Advised patient to keep diary of her symptoms prior to that appointment.   Baby is doing well.   Contraception - Patient was counseled on contraceptive options, including oral contraceptive pills, Depo Provera, long acting reversible contraceptive options (Nexplanon, Mirena, Paragard, Kyleena), barrier methods (such as condoms).  Patient is interested in pills and since she is breast feeding and having vision changes, will avoid estrogen containing contraception and plan for POPs.  She was counseled on this methods efficacy, how to initiate this method, use of back up contraception. She was counseled on the risk of transmission of STDs and expected changes in the menstrual cycle.  She was counseled on the risks involved with this medication including but not limited to irregular vaginal bleeding, mood changes, skin changes, weight changes. To start the contraception we discussed it is necessary that we are reasonably certain that she is not pregnant.  In order to do this, she may start immediately and knows it is not effective for the first 7 days so back up contraception such as condoms must be used. She agreed to abide by these conditions. She was recommended to follow up soon if there are any side effects or problems.    At this time, she may resume normal activities including exercise, lifting, sexual intercourse.   Reviewed last pap smear - 2/10/22- NILM- up to date.   Return in about 1 year (around 7/22/2025) for Annual physical.    Deedee Valencia MD

## 2024-07-31 ENCOUNTER — TELEPHONE (OUTPATIENT)
Dept: NEUROLOGY | Facility: CLINIC | Age: 24
End: 2024-07-31

## 2024-07-31 NOTE — TELEPHONE ENCOUNTER
PT CALLED TO RESCHEDULE HER 3:30 PM APPT TODAY DUE TO HER TRANSPORTATION NOT SHOWING UP. PT HAS BEEN RESCHEDULED.

## 2025-04-04 ENCOUNTER — TELEPHONE (OUTPATIENT)
Dept: OBSTETRICS AND GYNECOLOGY | Facility: CLINIC | Age: 25
End: 2025-04-04
Payer: COMMERCIAL

## 2025-04-04 DIAGNOSIS — Z30.011 ENCOUNTER FOR INITIAL PRESCRIPTION OF CONTRACEPTIVE PILLS: ICD-10-CM

## 2025-04-04 RX ORDER — ACETAMINOPHEN AND CODEINE PHOSPHATE 120; 12 MG/5ML; MG/5ML
1 SOLUTION ORAL DAILY
Qty: 84 TABLET | Refills: 0 | Status: SHIPPED | OUTPATIENT
Start: 2025-04-04 | End: 2026-04-04

## 2025-04-04 NOTE — TELEPHONE ENCOUNTER
Caller: Ana Enriquez    Relationship: Self    Best call back number: [phone] V/M    Requested Prescriptions:   norethindrone (MICRONOR) 0.35 MG tablet     Pharmacy where request should be sent: [facility] [id number] [facility] [address] [facility] AT [facility] [phone] [facility] [phone] FX       Does the patient have less than a 3 day supply:  [] Yes  [x] No    Would you like a call back once the refill request has been completed: [] Yes [] No  IF NEEDED YES     If the office needs to give you a call back, can they leave a voicemail: [x] Yes [] No        PICKED UP 3 MONTH TODAY - WANTS TO GO AHEAD TO PUT REQUEST IN AS PHARMACY TOLD HER THIS WAS HER LAST REFILL       Lul Lopez Rep   04/04/25 14:11 EDT

## 2025-07-03 NOTE — OUTREACH NOTE
[facility]  Unable to Reach       Questions/Answers      Flowsheet Row Responses   Pending Outreach Postpartum Check-in   Call Attempt Second   Outcome Left message   Next Call Attempt Date 06/19/24            Soco COOK - RN  Maternity Nurse Navigator    6/18/2024, 13:32 EDT    
07-Jul-2025 07:00

## 2025-07-28 ENCOUNTER — OFFICE VISIT (OUTPATIENT)
Dept: OBSTETRICS AND GYNECOLOGY | Facility: CLINIC | Age: 25
End: 2025-07-28
Payer: COMMERCIAL

## 2025-07-28 ENCOUNTER — TELEPHONE (OUTPATIENT)
Dept: OBSTETRICS AND GYNECOLOGY | Facility: CLINIC | Age: 25
End: 2025-07-28

## 2025-07-28 VITALS — BODY MASS INDEX: 25.22 KG/M2 | SYSTOLIC BLOOD PRESSURE: 111 MMHG | WEIGHT: 147 LBS | DIASTOLIC BLOOD PRESSURE: 76 MMHG

## 2025-07-28 DIAGNOSIS — Z30.41 ORAL CONTRACEPTIVE PILL SURVEILLANCE: ICD-10-CM

## 2025-07-28 DIAGNOSIS — Z01.419 ENCOUNTER FOR WELL WOMAN EXAM WITH ROUTINE GYNECOLOGICAL EXAM: Primary | ICD-10-CM

## 2025-07-28 DIAGNOSIS — Z12.4 CERVICAL CANCER SCREENING: ICD-10-CM

## 2025-07-28 PROCEDURE — 2014F MENTAL STATUS ASSESS: CPT | Performed by: STUDENT IN AN ORGANIZED HEALTH CARE EDUCATION/TRAINING PROGRAM

## 2025-07-28 PROCEDURE — 1160F RVW MEDS BY RX/DR IN RCRD: CPT | Performed by: STUDENT IN AN ORGANIZED HEALTH CARE EDUCATION/TRAINING PROGRAM

## 2025-07-28 PROCEDURE — 99395 PREV VISIT EST AGE 18-39: CPT | Performed by: STUDENT IN AN ORGANIZED HEALTH CARE EDUCATION/TRAINING PROGRAM

## 2025-07-28 PROCEDURE — 99459 PELVIC EXAMINATION: CPT | Performed by: STUDENT IN AN ORGANIZED HEALTH CARE EDUCATION/TRAINING PROGRAM

## 2025-07-28 PROCEDURE — 1159F MED LIST DOCD IN RCRD: CPT | Performed by: STUDENT IN AN ORGANIZED HEALTH CARE EDUCATION/TRAINING PROGRAM

## 2025-07-28 RX ORDER — ACETAMINOPHEN AND CODEINE PHOSPHATE 120; 12 MG/5ML; MG/5ML
1 SOLUTION ORAL DAILY
Qty: 84 TABLET | Refills: 3 | Status: SHIPPED | OUTPATIENT
Start: 2025-07-28 | End: 2025-07-28 | Stop reason: SDUPTHER

## 2025-07-28 RX ORDER — ACETAMINOPHEN AND CODEINE PHOSPHATE 120; 12 MG/5ML; MG/5ML
1 SOLUTION ORAL DAILY
Qty: 84 TABLET | Refills: 3 | Status: SHIPPED | OUTPATIENT
Start: 2025-07-28 | End: 2026-07-28

## 2025-07-28 NOTE — PROGRESS NOTES
GYN Annual Exam     CC- Here for annual exam.     Ana Enriquez is a 24 y.o. female who presents for annual well woman exam. She reports that she has not had a period while taking the Micronor since delivery on last year on 24. She is still breast feeding at night. She does feel cramping like she might be getting her period but does not experience any bleeding. She has also noticed off and on vaginal odor that is worsened by intercourse. No current symptoms. Lastly, she reports that sometimes it is difficult to orgasm. She does have sexual desire.       OB History          3    Para   2    Term   1       1    AB   1    Living   2         SAB        IAB        Ectopic   1    Molar        Multiple   0    Live Births   2          Obstetric Comments    at 35 weeks due to severe preeclampsia.  5 lbs approx.               Current contraception: oral progesterone-only contraceptive  History of abnormal Pap smear: no  Family history of uterine, colon or ovarian cancer: no  History of abnormal mammogram: n/a  Family history of breast cancer: yes - paternal great aunts   Last Pap : 2/10/22- NILM   HPV vaccine: thinks she has had it.     Past Medical History:   Diagnosis Date    Cannabis abuse     Ectopic pregnancy     Preeclampsia     with 1st pregnancy    Seizure in childhood     Self-mutilation     cutting    Substance abuse        Past Surgical History:   Procedure Laterality Date    WISDOM TOOTH EXTRACTION           Current Outpatient Medications:     ibuprofen (ADVIL,MOTRIN) 600 MG tablet, Take 1 tablet by mouth Every 6 (Six) Hours As Needed for Mild Pain (First Line: Mild pain.)., Disp: 90 tablet, Rfl: 1    norethindrone (MICRONOR) 0.35 MG tablet, Take 1 tablet by mouth Daily., Disp: 84 tablet, Rfl: 0    Sodium Fluoride 5000 Sensitive 1.1-5 % gel, , Disp: , Rfl:     valACYclovir (VALTREX) 1000 MG tablet, Take 2 tablets by mouth. PRN, Disp: , Rfl:     Allergies   Allergen Reactions    Penicillins  Hives       Social History     Tobacco Use    Smoking status: Former     Types: Electronic Cigarette    Smokeless tobacco: Never    Tobacco comments:     DENIED   Vaping Use    Vaping status: Former   Substance Use Topics    Alcohol use: No     Comment: DENIED    Drug use: Not Currently     Types: Marijuana     Comment: before pregnancy       Family History   Problem Relation Age of Onset    Nephrolithiasis Mother     Stroke Mother     Hypertension Mother     Other Maternal Grandmother         fibrocystic disease of breast    Nephrolithiasis Maternal Grandmother     Heart disease Maternal Grandmother     Leukemia Paternal Grandmother     Heart disease Paternal Grandmother     Lymphoma Paternal Grandmother     No Known Problems Sister     Cancer Paternal Grandfather         skin       Review of Systems   All other systems reviewed and are negative.      /76   Wt 66.7 kg (147 lb)   Breastfeeding No   BMI 25.22 kg/m²     Physical Exam  Vitals reviewed. Exam conducted with a chaperone present.   Constitutional:       General: She is not in acute distress.  HENT:      Head: Normocephalic and atraumatic.      Right Ear: External ear normal.      Left Ear: External ear normal.   Eyes:      Extraocular Movements: Extraocular movements intact.      Pupils: Pupils are equal, round, and reactive to light.   Cardiovascular:      Rate and Rhythm: Normal rate.   Pulmonary:      Effort: Pulmonary effort is normal. No respiratory distress.   Chest:   Breasts:     Right: No swelling, bleeding, inverted nipple, mass, nipple discharge, skin change or tenderness.      Left: No swelling, bleeding, inverted nipple, mass, nipple discharge, skin change or tenderness.   Abdominal:      General: There is no distension.      Palpations: Abdomen is soft.      Tenderness: There is no abdominal tenderness. There is no guarding or rebound.   Genitourinary:     General: Normal vulva.      Exam position: Lithotomy position.      Labia:          Right: No rash, tenderness, lesion or injury.         Left: No rash, tenderness, lesion or injury.       Urethra: No prolapse or urethral swelling.      Vagina: No vaginal discharge, erythema, tenderness, bleeding or lesions.      Cervix: Normal.      Uterus: Not enlarged, not fixed and not tender.       Adnexa:         Right: No mass, tenderness or fullness.          Left: No mass, tenderness or fullness.     Musculoskeletal:         General: No deformity. Normal range of motion.      Cervical back: Normal range of motion and neck supple.   Lymphadenopathy:      Upper Body:      Right upper body: No supraclavicular or axillary adenopathy.      Left upper body: No supraclavicular or axillary adenopathy.      Lower Body: No right inguinal adenopathy. No left inguinal adenopathy.   Skin:     General: Skin is warm and dry.   Neurological:      General: No focal deficit present.      Mental Status: She is alert and oriented to person, place, and time.   Psychiatric:         Mood and Affect: Mood normal.         Behavior: Behavior normal.         Assessment     1) GYN annual well woman exam.   2) Cervical cancer screening  3) Contraception- POP      Plan     1) Breast Health - Clinical breast exam yearly, Self breast awareness monthly. Will begin screening with mammogram at age 40 unless indicated sooner.   2) Pap - Collected pap smear for cervical cancer screening.   3) Smoking status- former smoker.   4) Activity recommends - Adult 150-300 min/week of multi-component physical activities that include balance training, aerobic and physical strengthening.  5) Contraception- Patient is doing well with Micronor. We discussed that amenorrhea is likely due to the Micronor maintaining a thin uterine lining and preventing ovulation. She would like to continue at this time. Refilled Micronor prescription for birth control today.   6) Vaginal odor- We discussed that she likely is having exacerbations of BV during these times  and advised the patient of good vaginal hygiene and recommended probiotic with lactobacillus daily. No symptoms today so deferred further evaluation.   7) Difficulty with orgasms- We discussed that this is a complex issue and she states that at baseline she has always had a little trouble. Encouraged frequent sexual intercourse and that the recent decrease may be related to POP and breast feeding. At this time, she will just observe.   8) Follow up prn and one year.       Deedee Valencia MD  7/28/2025  10:26 EDT

## 2025-07-28 NOTE — TELEPHONE ENCOUNTER
Provider: SYLVAIN PHILLIP    Caller: Ana Enriquez    Relationship to Patient: Self    Pharmacy: [facility] @ [address], [place], [place]    Phone Number: [phone]      Reason for Call: PATIENT NEEDS PRESCRIPTION SENT TO DIFFERENT PHARMACY    When was the patient last seen: 7/28/25    PATIENT MOVED AND IS REQUESTING THAT HER PRESCRIPTION TO BE SENT TO DIFFERENT LOCATION - PLEASE SEND PRESCRIPTION TO [facility] @ [address]     PATIENT STATED THAT codesyT MESSAGE IS OKAY TO CONFIRM

## 2025-07-28 NOTE — TELEPHONE ENCOUNTER
[facility] [address]

## 2025-08-01 LAB
CONV .: NORMAL
CYTOLOGIST CVX/VAG CYTO: NORMAL
CYTOLOGY CVX/VAG DOC CYTO: NORMAL
CYTOLOGY CVX/VAG DOC THIN PREP: NORMAL
DX ICD CODE: NORMAL
OTHER STN SPEC: NORMAL
SERVICE CMNT-IMP: NORMAL
STAT OF ADQ CVX/VAG CYTO-IMP: NORMAL